# Patient Record
Sex: FEMALE | Race: WHITE | Employment: FULL TIME | URBAN - METROPOLITAN AREA
[De-identification: names, ages, dates, MRNs, and addresses within clinical notes are randomized per-mention and may not be internally consistent; named-entity substitution may affect disease eponyms.]

---

## 2018-05-14 ENCOUNTER — HOSPITAL ENCOUNTER (EMERGENCY)
Age: 53
Discharge: HOME | End: 2018-05-14
Payer: COMMERCIAL

## 2018-05-14 ENCOUNTER — APPOINTMENT (OUTPATIENT)
Dept: GENERAL RADIOLOGY | Age: 53
End: 2018-05-14
Payer: COMMERCIAL

## 2018-05-14 VITALS
WEIGHT: 219 LBS | DIASTOLIC BLOOD PRESSURE: 80 MMHG | OXYGEN SATURATION: 98 % | SYSTOLIC BLOOD PRESSURE: 138 MMHG | HEIGHT: 66 IN | BODY MASS INDEX: 35.2 KG/M2 | RESPIRATION RATE: 16 BRPM | HEART RATE: 96 BPM | TEMPERATURE: 98.8 F

## 2018-05-14 DIAGNOSIS — M25.561 ACUTE PAIN OF RIGHT KNEE: Primary | ICD-10-CM

## 2018-05-14 DIAGNOSIS — M25.461 EFFUSION OF RIGHT KNEE: ICD-10-CM

## 2018-05-14 PROCEDURE — 73564 X-RAY EXAM KNEE 4 OR MORE: CPT

## 2018-05-14 PROCEDURE — 96372 THER/PROPH/DIAG INJ SC/IM: CPT

## 2018-05-14 PROCEDURE — 99283 EMERGENCY DEPT VISIT LOW MDM: CPT

## 2018-05-14 PROCEDURE — 6360000002 HC RX W HCPCS: Performed by: NURSE PRACTITIONER

## 2018-05-14 RX ORDER — ONDANSETRON 4 MG/1
4 TABLET, FILM COATED ORAL EVERY 8 HOURS PRN
Qty: 12 TABLET | Refills: 0 | Status: SHIPPED | OUTPATIENT
Start: 2018-05-14 | End: 2018-05-19

## 2018-05-14 RX ORDER — VALSARTAN 160 MG/1
80 TABLET ORAL DAILY
COMMUNITY

## 2018-05-14 RX ORDER — OXYCODONE HYDROCHLORIDE AND ACETAMINOPHEN 5; 325 MG/1; MG/1
1 TABLET ORAL EVERY 6 HOURS PRN
Qty: 10 TABLET | Refills: 0 | Status: SHIPPED | OUTPATIENT
Start: 2018-05-14 | End: 2018-05-17

## 2018-05-14 RX ORDER — PANTOPRAZOLE SODIUM 40 MG/1
40 GRANULE, DELAYED RELEASE ORAL
Status: ON HOLD | COMMUNITY
End: 2020-11-11

## 2018-05-14 RX ORDER — KETOROLAC TROMETHAMINE 10 MG/1
10 TABLET, FILM COATED ORAL EVERY 6 HOURS PRN
Qty: 20 TABLET | Refills: 0 | Status: ON HOLD | OUTPATIENT
Start: 2018-05-14 | End: 2020-11-09

## 2018-05-14 RX ORDER — DEXAMETHASONE SODIUM PHOSPHATE 10 MG/ML
10 INJECTION, SOLUTION INTRAMUSCULAR; INTRAVENOUS ONCE
Status: COMPLETED | OUTPATIENT
Start: 2018-05-14 | End: 2018-05-14

## 2018-05-14 RX ORDER — PREDNISONE 10 MG/1
40 TABLET ORAL DAILY
Qty: 20 TABLET | Refills: 0 | Status: SHIPPED | OUTPATIENT
Start: 2018-05-14 | End: 2018-05-19

## 2018-05-14 RX ORDER — KETOROLAC TROMETHAMINE 30 MG/ML
60 INJECTION, SOLUTION INTRAMUSCULAR; INTRAVENOUS ONCE
Status: COMPLETED | OUTPATIENT
Start: 2018-05-14 | End: 2018-05-14

## 2018-05-14 RX ADMIN — KETOROLAC TROMETHAMINE 60 MG: 30 INJECTION, SOLUTION INTRAMUSCULAR at 20:07

## 2018-05-14 RX ADMIN — DEXAMETHASONE SODIUM PHOSPHATE 10 MG: 10 INJECTION, SOLUTION INTRAMUSCULAR; INTRAVENOUS at 20:09

## 2018-05-14 ASSESSMENT — PAIN DESCRIPTION - ORIENTATION
ORIENTATION: RIGHT
ORIENTATION: RIGHT

## 2018-05-14 ASSESSMENT — PAIN DESCRIPTION - PAIN TYPE
TYPE: ACUTE PAIN
TYPE: ACUTE PAIN

## 2018-05-14 ASSESSMENT — PAIN DESCRIPTION - ONSET: ONSET: ON-GOING

## 2018-05-14 ASSESSMENT — PAIN SCALES - GENERAL
PAINLEVEL_OUTOF10: 7
PAINLEVEL_OUTOF10: 8
PAINLEVEL_OUTOF10: 8

## 2018-05-14 ASSESSMENT — PAIN DESCRIPTION - FREQUENCY: FREQUENCY: CONTINUOUS

## 2018-05-14 ASSESSMENT — PAIN - FUNCTIONAL ASSESSMENT: PAIN_FUNCTIONAL_ASSESSMENT: 0-10

## 2018-05-14 ASSESSMENT — PAIN DESCRIPTION - LOCATION
LOCATION: KNEE
LOCATION: KNEE

## 2018-05-14 ASSESSMENT — PAIN DESCRIPTION - DESCRIPTORS: DESCRIPTORS: ACHING

## 2018-05-14 ASSESSMENT — PAIN DESCRIPTION - PROGRESSION: CLINICAL_PROGRESSION: GRADUALLY WORSENING

## 2019-01-07 ENCOUNTER — APPOINTMENT (OUTPATIENT)
Dept: CT IMAGING | Age: 54
End: 2019-01-07
Payer: COMMERCIAL

## 2019-01-07 ENCOUNTER — HOSPITAL ENCOUNTER (EMERGENCY)
Age: 54
Discharge: HOME | End: 2019-01-07
Attending: EMERGENCY MEDICINE
Payer: COMMERCIAL

## 2019-01-07 VITALS
SYSTOLIC BLOOD PRESSURE: 140 MMHG | HEIGHT: 66 IN | OXYGEN SATURATION: 97 % | DIASTOLIC BLOOD PRESSURE: 84 MMHG | BODY MASS INDEX: 28.12 KG/M2 | TEMPERATURE: 98.9 F | RESPIRATION RATE: 16 BRPM | WEIGHT: 175 LBS | HEART RATE: 95 BPM

## 2019-01-07 DIAGNOSIS — S09.90XA CLOSED HEAD INJURY, INITIAL ENCOUNTER: Primary | ICD-10-CM

## 2019-01-07 DIAGNOSIS — S06.0X0A CONCUSSION WITHOUT LOSS OF CONSCIOUSNESS, INITIAL ENCOUNTER: ICD-10-CM

## 2019-01-07 PROCEDURE — 2580000003 HC RX 258: Performed by: EMERGENCY MEDICINE

## 2019-01-07 PROCEDURE — 96375 TX/PRO/DX INJ NEW DRUG ADDON: CPT

## 2019-01-07 PROCEDURE — 99284 EMERGENCY DEPT VISIT MOD MDM: CPT

## 2019-01-07 PROCEDURE — 6360000002 HC RX W HCPCS: Performed by: EMERGENCY MEDICINE

## 2019-01-07 PROCEDURE — 70486 CT MAXILLOFACIAL W/O DYE: CPT

## 2019-01-07 PROCEDURE — 6360000002 HC RX W HCPCS

## 2019-01-07 PROCEDURE — 70450 CT HEAD/BRAIN W/O DYE: CPT

## 2019-01-07 PROCEDURE — 96374 THER/PROPH/DIAG INJ IV PUSH: CPT

## 2019-01-07 RX ORDER — ONDANSETRON 4 MG/1
4 TABLET, ORALLY DISINTEGRATING ORAL ONCE
Status: COMPLETED | OUTPATIENT
Start: 2019-01-07 | End: 2019-01-07

## 2019-01-07 RX ORDER — MORPHINE SULFATE 2 MG/ML
2 INJECTION, SOLUTION INTRAMUSCULAR; INTRAVENOUS ONCE
Status: COMPLETED | OUTPATIENT
Start: 2019-01-07 | End: 2019-01-07

## 2019-01-07 RX ORDER — KETOROLAC TROMETHAMINE 30 MG/ML
30 INJECTION, SOLUTION INTRAMUSCULAR; INTRAVENOUS ONCE
Status: DISCONTINUED | OUTPATIENT
Start: 2019-01-07 | End: 2019-01-07

## 2019-01-07 RX ORDER — ONDANSETRON 2 MG/ML
INJECTION INTRAMUSCULAR; INTRAVENOUS
Status: COMPLETED
Start: 2019-01-07 | End: 2019-01-07

## 2019-01-07 RX ORDER — 0.9 % SODIUM CHLORIDE 0.9 %
500 INTRAVENOUS SOLUTION INTRAVENOUS ONCE
Status: COMPLETED | OUTPATIENT
Start: 2019-01-07 | End: 2019-01-07

## 2019-01-07 RX ORDER — ONDANSETRON 2 MG/ML
4 INJECTION INTRAMUSCULAR; INTRAVENOUS ONCE
Status: DISCONTINUED | OUTPATIENT
Start: 2019-01-07 | End: 2019-01-07

## 2019-01-07 RX ORDER — FLUTICASONE FUROATE AND VILANTEROL 100; 25 UG/1; UG/1
POWDER RESPIRATORY (INHALATION) DAILY
COMMUNITY

## 2019-01-07 RX ORDER — ONDANSETRON 4 MG/1
4 TABLET, ORALLY DISINTEGRATING ORAL EVERY 4 HOURS PRN
Qty: 10 TABLET | Refills: 0 | Status: ON HOLD | OUTPATIENT
Start: 2019-01-07 | End: 2020-11-09

## 2019-01-07 RX ORDER — FENTANYL CITRATE 50 UG/ML
50 INJECTION, SOLUTION INTRAMUSCULAR; INTRAVENOUS ONCE
Status: COMPLETED | OUTPATIENT
Start: 2019-01-07 | End: 2019-01-07

## 2019-01-07 RX ORDER — 0.9 % SODIUM CHLORIDE 0.9 %
1000 INTRAVENOUS SOLUTION INTRAVENOUS ONCE
Status: DISCONTINUED | OUTPATIENT
Start: 2019-01-07 | End: 2019-01-07

## 2019-01-07 RX ORDER — ONDANSETRON 2 MG/ML
4 INJECTION INTRAMUSCULAR; INTRAVENOUS ONCE
Status: COMPLETED | OUTPATIENT
Start: 2019-01-07 | End: 2019-01-07

## 2019-01-07 RX ORDER — ONDANSETRON 4 MG/1
TABLET, ORALLY DISINTEGRATING ORAL
Status: COMPLETED
Start: 2019-01-07 | End: 2019-01-07

## 2019-01-07 RX ADMIN — ONDANSETRON 4 MG: 4 TABLET, ORALLY DISINTEGRATING ORAL at 22:28

## 2019-01-07 RX ADMIN — SODIUM CHLORIDE 500 ML: 9 INJECTION, SOLUTION INTRAVENOUS at 19:32

## 2019-01-07 RX ADMIN — MORPHINE SULFATE 2 MG: 2 INJECTION, SOLUTION INTRAMUSCULAR; INTRAVENOUS at 20:45

## 2019-01-07 RX ADMIN — ONDANSETRON 4 MG: 2 INJECTION INTRAMUSCULAR; INTRAVENOUS at 19:08

## 2019-01-07 RX ADMIN — FENTANYL CITRATE 50 MCG: 50 INJECTION, SOLUTION INTRAMUSCULAR; INTRAVENOUS at 19:31

## 2019-01-07 RX ADMIN — ONDANSETRON 4 MG: 2 INJECTION INTRAMUSCULAR; INTRAVENOUS at 18:43

## 2019-01-07 ASSESSMENT — PAIN DESCRIPTION - DESCRIPTORS
DESCRIPTORS: PRESSURE
DESCRIPTORS: HEADACHE
DESCRIPTORS: HEADACHE

## 2019-01-07 ASSESSMENT — PAIN SCALES - GENERAL
PAINLEVEL_OUTOF10: 10
PAINLEVEL_OUTOF10: 8
PAINLEVEL_OUTOF10: 10
PAINLEVEL_OUTOF10: 10
PAINLEVEL_OUTOF10: 8

## 2019-01-07 ASSESSMENT — PAIN DESCRIPTION - PAIN TYPE
TYPE: ACUTE PAIN

## 2019-01-07 ASSESSMENT — PAIN DESCRIPTION - LOCATION
LOCATION: HEAD

## 2019-01-07 ASSESSMENT — PAIN DESCRIPTION - FREQUENCY: FREQUENCY: CONTINUOUS

## 2019-01-07 ASSESSMENT — PAIN DESCRIPTION - PROGRESSION: CLINICAL_PROGRESSION: GRADUALLY WORSENING

## 2020-11-08 ENCOUNTER — APPOINTMENT (OUTPATIENT)
Dept: CT IMAGING | Age: 55
DRG: 177 | End: 2020-11-08
Payer: COMMERCIAL

## 2020-11-08 ENCOUNTER — HOSPITAL ENCOUNTER (INPATIENT)
Age: 55
LOS: 5 days | Discharge: HOME | DRG: 177 | End: 2020-11-14
Attending: EMERGENCY MEDICINE | Admitting: INTERNAL MEDICINE
Payer: COMMERCIAL

## 2020-11-08 ENCOUNTER — APPOINTMENT (OUTPATIENT)
Dept: GENERAL RADIOLOGY | Age: 55
DRG: 177 | End: 2020-11-08
Payer: COMMERCIAL

## 2020-11-08 LAB
ALBUMIN SERPL-MCNC: 3.8 G/DL (ref 3.5–5.2)
ALP BLD-CCNC: 116 U/L (ref 35–104)
ALT SERPL-CCNC: 63 U/L (ref 0–32)
ANION GAP SERPL CALCULATED.3IONS-SCNC: 14 MMOL/L (ref 7–16)
AST SERPL-CCNC: 41 U/L (ref 0–31)
BACTERIA: ABNORMAL /HPF
BASOPHILS ABSOLUTE: 0.01 E9/L (ref 0–0.2)
BASOPHILS RELATIVE PERCENT: 0.1 % (ref 0–2)
BILIRUB SERPL-MCNC: 0.4 MG/DL (ref 0–1.2)
BILIRUBIN URINE: NEGATIVE
BLOOD, URINE: NEGATIVE
BUN BLDV-MCNC: 10 MG/DL (ref 6–20)
CALCIUM SERPL-MCNC: 9.2 MG/DL (ref 8.6–10.2)
CHLORIDE BLD-SCNC: 99 MMOL/L (ref 98–107)
CLARITY: CLEAR
CO2: 22 MMOL/L (ref 22–29)
COLOR: YELLOW
CREAT SERPL-MCNC: 0.7 MG/DL (ref 0.5–1)
EOSINOPHILS ABSOLUTE: 0 E9/L (ref 0.05–0.5)
EOSINOPHILS RELATIVE PERCENT: 0 % (ref 0–6)
GFR AFRICAN AMERICAN: >60
GFR NON-AFRICAN AMERICAN: >60 ML/MIN/1.73
GLUCOSE BLD-MCNC: 125 MG/DL (ref 74–99)
GLUCOSE URINE: NEGATIVE MG/DL
HCT VFR BLD CALC: 41.2 % (ref 34–48)
HEMOGLOBIN: 13.8 G/DL (ref 11.5–15.5)
IMMATURE GRANULOCYTES #: 0.09 E9/L
IMMATURE GRANULOCYTES %: 1.1 % (ref 0–5)
INR BLD: 1.1
KETONES, URINE: NEGATIVE MG/DL
LACTIC ACID: 0.9 MMOL/L (ref 0.5–2.2)
LEUKOCYTE ESTERASE, URINE: NEGATIVE
LYMPHOCYTES ABSOLUTE: 1.09 E9/L (ref 1.5–4)
LYMPHOCYTES RELATIVE PERCENT: 13.9 % (ref 20–42)
MCH RBC QN AUTO: 29.2 PG (ref 26–35)
MCHC RBC AUTO-ENTMCNC: 33.5 % (ref 32–34.5)
MCV RBC AUTO: 87.1 FL (ref 80–99.9)
MONOCYTES ABSOLUTE: 0.51 E9/L (ref 0.1–0.95)
MONOCYTES RELATIVE PERCENT: 6.5 % (ref 2–12)
NEUTROPHILS ABSOLUTE: 6.14 E9/L (ref 1.8–7.3)
NEUTROPHILS RELATIVE PERCENT: 78.4 % (ref 43–80)
NITRITE, URINE: NEGATIVE
PDW BLD-RTO: 12.8 FL (ref 11.5–15)
PH UA: 6 (ref 5–9)
PLATELET # BLD: 225 E9/L (ref 130–450)
PMV BLD AUTO: 9.5 FL (ref 7–12)
POTASSIUM SERPL-SCNC: 3.5 MMOL/L (ref 3.5–5)
PRO-BNP: 206 PG/ML (ref 0–125)
PROTEIN UA: ABNORMAL MG/DL
PROTHROMBIN TIME: 12.3 SEC (ref 9.3–12.4)
RBC # BLD: 4.73 E12/L (ref 3.5–5.5)
RBC UA: ABNORMAL /HPF (ref 0–2)
SODIUM BLD-SCNC: 135 MMOL/L (ref 132–146)
SPECIFIC GRAVITY UA: 1.02 (ref 1–1.03)
TOTAL PROTEIN: 7.5 G/DL (ref 6.4–8.3)
TROPONIN: <0.01 NG/ML (ref 0–0.03)
UROBILINOGEN, URINE: >=8 E.U./DL
WBC # BLD: 7.8 E9/L (ref 4.5–11.5)
WBC UA: ABNORMAL /HPF (ref 0–5)

## 2020-11-08 PROCEDURE — 71045 X-RAY EXAM CHEST 1 VIEW: CPT

## 2020-11-08 PROCEDURE — 83880 ASSAY OF NATRIURETIC PEPTIDE: CPT

## 2020-11-08 PROCEDURE — 84484 ASSAY OF TROPONIN QUANT: CPT

## 2020-11-08 PROCEDURE — 36415 COLL VENOUS BLD VENIPUNCTURE: CPT

## 2020-11-08 PROCEDURE — 96374 THER/PROPH/DIAG INJ IV PUSH: CPT

## 2020-11-08 PROCEDURE — 80053 COMPREHEN METABOLIC PANEL: CPT

## 2020-11-08 PROCEDURE — 85025 COMPLETE CBC W/AUTO DIFF WBC: CPT

## 2020-11-08 PROCEDURE — 71275 CT ANGIOGRAPHY CHEST: CPT

## 2020-11-08 PROCEDURE — 99285 EMERGENCY DEPT VISIT HI MDM: CPT

## 2020-11-08 PROCEDURE — 87040 BLOOD CULTURE FOR BACTERIA: CPT

## 2020-11-08 PROCEDURE — 6360000002 HC RX W HCPCS: Performed by: STUDENT IN AN ORGANIZED HEALTH CARE EDUCATION/TRAINING PROGRAM

## 2020-11-08 PROCEDURE — 83605 ASSAY OF LACTIC ACID: CPT

## 2020-11-08 PROCEDURE — 6370000000 HC RX 637 (ALT 250 FOR IP)

## 2020-11-08 PROCEDURE — 96375 TX/PRO/DX INJ NEW DRUG ADDON: CPT

## 2020-11-08 PROCEDURE — 85610 PROTHROMBIN TIME: CPT

## 2020-11-08 PROCEDURE — 87088 URINE BACTERIA CULTURE: CPT

## 2020-11-08 PROCEDURE — 81001 URINALYSIS AUTO W/SCOPE: CPT

## 2020-11-08 PROCEDURE — 93005 ELECTROCARDIOGRAM TRACING: CPT | Performed by: NURSE PRACTITIONER

## 2020-11-08 PROCEDURE — 6360000002 HC RX W HCPCS

## 2020-11-08 PROCEDURE — 2580000003 HC RX 258: Performed by: EMERGENCY MEDICINE

## 2020-11-08 RX ORDER — ACETAMINOPHEN 500 MG
TABLET ORAL
Status: COMPLETED
Start: 2020-11-08 | End: 2020-11-08

## 2020-11-08 RX ORDER — 0.9 % SODIUM CHLORIDE 0.9 %
1000 INTRAVENOUS SOLUTION INTRAVENOUS ONCE
Status: COMPLETED | OUTPATIENT
Start: 2020-11-08 | End: 2020-11-09

## 2020-11-08 RX ORDER — DEXAMETHASONE SODIUM PHOSPHATE 10 MG/ML
6 INJECTION, SOLUTION INTRAMUSCULAR; INTRAVENOUS ONCE
Status: COMPLETED | OUTPATIENT
Start: 2020-11-08 | End: 2020-11-08

## 2020-11-08 RX ORDER — ONDANSETRON 2 MG/ML
4 INJECTION INTRAMUSCULAR; INTRAVENOUS ONCE
Status: COMPLETED | OUTPATIENT
Start: 2020-11-08 | End: 2020-11-08

## 2020-11-08 RX ORDER — ONDANSETRON 2 MG/ML
INJECTION INTRAMUSCULAR; INTRAVENOUS
Status: COMPLETED
Start: 2020-11-08 | End: 2020-11-08

## 2020-11-08 RX ADMIN — DEXAMETHASONE SODIUM PHOSPHATE 6 MG: 10 INJECTION, SOLUTION INTRAMUSCULAR; INTRAVENOUS at 21:19

## 2020-11-08 RX ADMIN — ACETAMINOPHEN 1000 MG: 500 TABLET ORAL at 22:16

## 2020-11-08 RX ADMIN — SODIUM CHLORIDE 1000 ML: 9 INJECTION, SOLUTION INTRAVENOUS at 21:17

## 2020-11-08 RX ADMIN — ONDANSETRON 4 MG: 2 INJECTION INTRAMUSCULAR; INTRAVENOUS at 22:29

## 2020-11-08 ASSESSMENT — ENCOUNTER SYMPTOMS
ABDOMINAL DISTENTION: 0
DIARRHEA: 0
BACK PAIN: 0
EYE PAIN: 0
WHEEZING: 0
COUGH: 1
SINUS PRESSURE: 0
EYE REDNESS: 0
EYE DISCHARGE: 0
SHORTNESS OF BREATH: 1
SORE THROAT: 0
NAUSEA: 0
HEMOPTYSIS: 0
VOMITING: 0
ABDOMINAL PAIN: 0

## 2020-11-08 ASSESSMENT — PAIN SCALES - GENERAL: PAINLEVEL_OUTOF10: 10

## 2020-11-09 PROBLEM — R74.8 ABNORMAL LIVER ENZYMES: Status: ACTIVE | Noted: 2020-11-09

## 2020-11-09 PROBLEM — J96.01 ACUTE HYPOXEMIC RESPIRATORY FAILURE (HCC): Status: ACTIVE | Noted: 2020-11-09

## 2020-11-09 PROBLEM — J12.82 PNEUMONIA DUE TO COVID-19 VIRUS: Status: ACTIVE | Noted: 2020-11-09

## 2020-11-09 PROBLEM — A41.9 SEPSIS (HCC): Status: ACTIVE | Noted: 2020-11-09

## 2020-11-09 PROBLEM — I50.22 CHRONIC SYSTOLIC CONGESTIVE HEART FAILURE (HCC): Status: ACTIVE | Noted: 2020-11-09

## 2020-11-09 PROBLEM — U07.1 PNEUMONIA DUE TO COVID-19 VIRUS: Status: ACTIVE | Noted: 2020-11-09

## 2020-11-09 LAB
ALBUMIN SERPL-MCNC: 3.9 G/DL (ref 3.5–5.2)
ALP BLD-CCNC: 103 U/L (ref 35–104)
ALT SERPL-CCNC: 52 U/L (ref 0–32)
ANION GAP SERPL CALCULATED.3IONS-SCNC: 7 MMOL/L (ref 7–16)
APTT: 26.8 SEC (ref 24.5–35.1)
AST SERPL-CCNC: 34 U/L (ref 0–31)
BASOPHILS ABSOLUTE: 0.01 E9/L (ref 0–0.2)
BASOPHILS RELATIVE PERCENT: 0.2 % (ref 0–2)
BILIRUB SERPL-MCNC: 0.4 MG/DL (ref 0–1.2)
BUN BLDV-MCNC: 11 MG/DL (ref 6–20)
C-REACTIVE PROTEIN: 13.7 MG/DL (ref 0–0.4)
CALCIUM SERPL-MCNC: 9 MG/DL (ref 8.6–10.2)
CHLORIDE BLD-SCNC: 104 MMOL/L (ref 98–107)
CO2: 28 MMOL/L (ref 22–29)
CREAT SERPL-MCNC: 0.6 MG/DL (ref 0.5–1)
D DIMER: 517 NG/ML DDU
D DIMER: 518 NG/ML DDU
EKG ATRIAL RATE: 98 BPM
EKG P AXIS: 28 DEGREES
EKG P-R INTERVAL: 206 MS
EKG Q-T INTERVAL: 348 MS
EKG QRS DURATION: 106 MS
EKG QTC CALCULATION (BAZETT): 444 MS
EKG R AXIS: -41 DEGREES
EKG T AXIS: 58 DEGREES
EKG VENTRICULAR RATE: 98 BPM
EOSINOPHILS ABSOLUTE: 0 E9/L (ref 0.05–0.5)
EOSINOPHILS RELATIVE PERCENT: 0 % (ref 0–6)
FERRITIN: 470 NG/ML
FIBRINOGEN: 700 MG/DL (ref 225–540)
FIBRINOGEN: >700 MG/DL (ref 225–540)
GFR AFRICAN AMERICAN: >60
GFR NON-AFRICAN AMERICAN: >60 ML/MIN/1.73
GLUCOSE BLD-MCNC: 138 MG/DL (ref 74–99)
HCT VFR BLD CALC: 37.2 % (ref 34–48)
HEMOGLOBIN: 12.6 G/DL (ref 11.5–15.5)
IMMATURE GRANULOCYTES #: 0.06 E9/L
IMMATURE GRANULOCYTES %: 1.2 % (ref 0–5)
LACTATE DEHYDROGENASE: 309 U/L (ref 135–214)
LACTIC ACID: 1.2 MMOL/L (ref 0.5–2.2)
LYMPHOCYTES ABSOLUTE: 1.05 E9/L (ref 1.5–4)
LYMPHOCYTES RELATIVE PERCENT: 21.6 % (ref 20–42)
MAGNESIUM: 2.3 MG/DL (ref 1.6–2.6)
MCH RBC QN AUTO: 29.9 PG (ref 26–35)
MCHC RBC AUTO-ENTMCNC: 33.9 % (ref 32–34.5)
MCV RBC AUTO: 88.2 FL (ref 80–99.9)
MONOCYTES ABSOLUTE: 0.29 E9/L (ref 0.1–0.95)
MONOCYTES RELATIVE PERCENT: 6 % (ref 2–12)
NEUTROPHILS ABSOLUTE: 3.44 E9/L (ref 1.8–7.3)
NEUTROPHILS RELATIVE PERCENT: 71 % (ref 43–80)
PDW BLD-RTO: 12.9 FL (ref 11.5–15)
PLATELET # BLD: 242 E9/L (ref 130–450)
PMV BLD AUTO: 9.7 FL (ref 7–12)
POTASSIUM REFLEX MAGNESIUM: 3.2 MMOL/L (ref 3.5–5)
PROCALCITONIN: 0.05 NG/ML (ref 0–0.08)
RBC # BLD: 4.22 E12/L (ref 3.5–5.5)
SODIUM BLD-SCNC: 139 MMOL/L (ref 132–146)
TOTAL PROTEIN: 7.4 G/DL (ref 6.4–8.3)
TROPONIN: <0.01 NG/ML (ref 0–0.03)
TROPONIN: <0.01 NG/ML (ref 0–0.03)
WBC # BLD: 4.9 E9/L (ref 4.5–11.5)

## 2020-11-09 PROCEDURE — 2580000003 HC RX 258

## 2020-11-09 PROCEDURE — 6360000002 HC RX W HCPCS: Performed by: FAMILY MEDICINE

## 2020-11-09 PROCEDURE — 85730 THROMBOPLASTIN TIME PARTIAL: CPT

## 2020-11-09 PROCEDURE — 2580000003 HC RX 258: Performed by: FAMILY MEDICINE

## 2020-11-09 PROCEDURE — 82728 ASSAY OF FERRITIN: CPT

## 2020-11-09 PROCEDURE — 83615 LACTATE (LD) (LDH) ENZYME: CPT

## 2020-11-09 PROCEDURE — 83735 ASSAY OF MAGNESIUM: CPT

## 2020-11-09 PROCEDURE — 2500000003 HC RX 250 WO HCPCS: Performed by: SPECIALIST

## 2020-11-09 PROCEDURE — 85378 FIBRIN DEGRADE SEMIQUANT: CPT

## 2020-11-09 PROCEDURE — 83605 ASSAY OF LACTIC ACID: CPT

## 2020-11-09 PROCEDURE — 6370000000 HC RX 637 (ALT 250 FOR IP): Performed by: SPECIALIST

## 2020-11-09 PROCEDURE — 80053 COMPREHEN METABOLIC PANEL: CPT

## 2020-11-09 PROCEDURE — 85025 COMPLETE CBC W/AUTO DIFF WBC: CPT

## 2020-11-09 PROCEDURE — 84484 ASSAY OF TROPONIN QUANT: CPT

## 2020-11-09 PROCEDURE — 85384 FIBRINOGEN ACTIVITY: CPT

## 2020-11-09 PROCEDURE — 36415 COLL VENOUS BLD VENIPUNCTURE: CPT

## 2020-11-09 PROCEDURE — 84145 PROCALCITONIN (PCT): CPT

## 2020-11-09 PROCEDURE — 93010 ELECTROCARDIOGRAM REPORT: CPT | Performed by: INTERNAL MEDICINE

## 2020-11-09 PROCEDURE — 2580000003 HC RX 258: Performed by: SPECIALIST

## 2020-11-09 PROCEDURE — 6370000000 HC RX 637 (ALT 250 FOR IP): Performed by: FAMILY MEDICINE

## 2020-11-09 PROCEDURE — 2140000000 HC CCU INTERMEDIATE R&B

## 2020-11-09 PROCEDURE — 86140 C-REACTIVE PROTEIN: CPT

## 2020-11-09 PROCEDURE — XW033E5 INTRODUCTION OF REMDESIVIR ANTI-INFECTIVE INTO PERIPHERAL VEIN, PERCUTANEOUS APPROACH, NEW TECHNOLOGY GROUP 5: ICD-10-PCS | Performed by: SPECIALIST

## 2020-11-09 PROCEDURE — 6370000000 HC RX 637 (ALT 250 FOR IP): Performed by: INTERNAL MEDICINE

## 2020-11-09 RX ORDER — ACETAMINOPHEN 650 MG/1
650 SUPPOSITORY RECTAL EVERY 6 HOURS PRN
Status: DISCONTINUED | OUTPATIENT
Start: 2020-11-09 | End: 2020-11-14 | Stop reason: HOSPADM

## 2020-11-09 RX ORDER — ALBUTEROL SULFATE 90 UG/1
2 AEROSOL, METERED RESPIRATORY (INHALATION) EVERY 6 HOURS PRN
Status: DISCONTINUED | OUTPATIENT
Start: 2020-11-09 | End: 2020-11-14 | Stop reason: HOSPADM

## 2020-11-09 RX ORDER — FLUTICASONE PROPIONATE 50 MCG
1 SPRAY, SUSPENSION (ML) NASAL DAILY
Status: DISCONTINUED | OUTPATIENT
Start: 2020-11-09 | End: 2020-11-14 | Stop reason: HOSPADM

## 2020-11-09 RX ORDER — LANOLIN ALCOHOL/MO/W.PET/CERES
6 CREAM (GRAM) TOPICAL DAILY
Status: DISCONTINUED | OUTPATIENT
Start: 2020-11-09 | End: 2020-11-14 | Stop reason: HOSPADM

## 2020-11-09 RX ORDER — PANTOPRAZOLE SODIUM 40 MG/1
40 TABLET, DELAYED RELEASE ORAL
Status: DISCONTINUED | OUTPATIENT
Start: 2020-11-09 | End: 2020-11-14 | Stop reason: HOSPADM

## 2020-11-09 RX ORDER — ZINC SULFATE 50(220)MG
50 CAPSULE ORAL DAILY
Status: DISCONTINUED | OUTPATIENT
Start: 2020-11-09 | End: 2020-11-14 | Stop reason: HOSPADM

## 2020-11-09 RX ORDER — GUAIFENESIN/DEXTROMETHORPHAN 100-10MG/5
5 SYRUP ORAL EVERY 4 HOURS PRN
Status: DISCONTINUED | OUTPATIENT
Start: 2020-11-09 | End: 2020-11-14 | Stop reason: HOSPADM

## 2020-11-09 RX ORDER — SODIUM CHLORIDE 9 MG/ML
INJECTION, SOLUTION INTRAVENOUS CONTINUOUS
Status: DISCONTINUED | OUTPATIENT
Start: 2020-11-09 | End: 2020-11-09

## 2020-11-09 RX ORDER — POTASSIUM CHLORIDE 20 MEQ/1
40 TABLET, EXTENDED RELEASE ORAL ONCE
Status: COMPLETED | OUTPATIENT
Start: 2020-11-09 | End: 2020-11-09

## 2020-11-09 RX ORDER — ACETAMINOPHEN 325 MG/1
650 TABLET ORAL EVERY 6 HOURS PRN
Status: DISCONTINUED | OUTPATIENT
Start: 2020-11-09 | End: 2020-11-14 | Stop reason: HOSPADM

## 2020-11-09 RX ORDER — 0.9 % SODIUM CHLORIDE 0.9 %
30 INTRAVENOUS SOLUTION INTRAVENOUS PRN
Status: DISCONTINUED | OUTPATIENT
Start: 2020-11-09 | End: 2020-11-14 | Stop reason: HOSPADM

## 2020-11-09 RX ORDER — CARVEDILOL 3.12 MG/1
3.12 TABLET ORAL 2 TIMES DAILY WITH MEALS
Status: DISCONTINUED | OUTPATIENT
Start: 2020-11-09 | End: 2020-11-11

## 2020-11-09 RX ORDER — ASCORBIC ACID 500 MG
500 TABLET ORAL 2 TIMES DAILY
Status: DISCONTINUED | OUTPATIENT
Start: 2020-11-09 | End: 2020-11-14 | Stop reason: HOSPADM

## 2020-11-09 RX ORDER — BUDESONIDE AND FORMOTEROL FUMARATE DIHYDRATE 80; 4.5 UG/1; UG/1
2 AEROSOL RESPIRATORY (INHALATION) 2 TIMES DAILY
Status: DISCONTINUED | OUTPATIENT
Start: 2020-11-09 | End: 2020-11-14 | Stop reason: HOSPADM

## 2020-11-09 RX ORDER — MONTELUKAST SODIUM 10 MG/1
10 TABLET ORAL NIGHTLY
COMMUNITY

## 2020-11-09 RX ORDER — ATORVASTATIN CALCIUM 20 MG/1
20 TABLET, FILM COATED ORAL NIGHTLY
Status: DISCONTINUED | OUTPATIENT
Start: 2020-11-09 | End: 2020-11-14 | Stop reason: HOSPADM

## 2020-11-09 RX ORDER — DEXAMETHASONE 4 MG/1
6 TABLET ORAL DAILY
Status: DISCONTINUED | OUTPATIENT
Start: 2020-11-09 | End: 2020-11-14 | Stop reason: HOSPADM

## 2020-11-09 RX ORDER — VALSARTAN 160 MG/1
160 TABLET ORAL DAILY
Status: DISCONTINUED | OUTPATIENT
Start: 2020-11-09 | End: 2020-11-11

## 2020-11-09 RX ORDER — SODIUM CHLORIDE 0.9 % (FLUSH) 0.9 %
SYRINGE (ML) INJECTION
Status: COMPLETED
Start: 2020-11-09 | End: 2020-11-09

## 2020-11-09 RX ORDER — LEVOFLOXACIN 5 MG/ML
500 INJECTION, SOLUTION INTRAVENOUS EVERY 24 HOURS
Status: DISCONTINUED | OUTPATIENT
Start: 2020-11-09 | End: 2020-11-09

## 2020-11-09 RX ORDER — SODIUM CHLORIDE 9 MG/ML
INJECTION, SOLUTION INTRAVENOUS CONTINUOUS
Status: ACTIVE | OUTPATIENT
Start: 2020-11-09 | End: 2020-11-09

## 2020-11-09 RX ADMIN — LEVOFLOXACIN 500 MG: 5 INJECTION, SOLUTION INTRAVENOUS at 02:28

## 2020-11-09 RX ADMIN — SODIUM CHLORIDE: 9 INJECTION, SOLUTION INTRAVENOUS at 05:51

## 2020-11-09 RX ADMIN — ATORVASTATIN CALCIUM 20 MG: 20 TABLET, FILM COATED ORAL at 21:09

## 2020-11-09 RX ADMIN — CARVEDILOL 3.12 MG: 3.12 TABLET, FILM COATED ORAL at 16:32

## 2020-11-09 RX ADMIN — Medication 50 MG: at 09:45

## 2020-11-09 RX ADMIN — ENOXAPARIN SODIUM 30 MG: 30 INJECTION SUBCUTANEOUS at 21:09

## 2020-11-09 RX ADMIN — ACETAMINOPHEN 650 MG: 325 TABLET ORAL at 16:33

## 2020-11-09 RX ADMIN — ENOXAPARIN SODIUM 30 MG: 30 INJECTION SUBCUTANEOUS at 05:52

## 2020-11-09 RX ADMIN — VALSARTAN 160 MG: 160 TABLET, FILM COATED ORAL at 09:45

## 2020-11-09 RX ADMIN — SODIUM CHLORIDE, PRESERVATIVE FREE 10 ML: 5 INJECTION INTRAVENOUS at 21:09

## 2020-11-09 RX ADMIN — PANTOPRAZOLE SODIUM 40 MG: 40 TABLET, DELAYED RELEASE ORAL at 06:13

## 2020-11-09 RX ADMIN — GUAIFENESIN AND DEXTROMETHORPHAN 5 ML: 100; 10 SYRUP ORAL at 10:28

## 2020-11-09 RX ADMIN — SODIUM CHLORIDE: 9 INJECTION, SOLUTION INTRAVENOUS at 02:28

## 2020-11-09 RX ADMIN — GUAIFENESIN AND DEXTROMETHORPHAN 5 ML: 100; 10 SYRUP ORAL at 06:26

## 2020-11-09 RX ADMIN — REMDESIVIR 200 MG: 100 INJECTION, POWDER, LYOPHILIZED, FOR SOLUTION INTRAVENOUS at 12:07

## 2020-11-09 RX ADMIN — OXYCODONE HYDROCHLORIDE AND ACETAMINOPHEN 500 MG: 500 TABLET ORAL at 09:45

## 2020-11-09 RX ADMIN — ALBUTEROL SULFATE 2 PUFF: 90 AEROSOL, METERED RESPIRATORY (INHALATION) at 16:34

## 2020-11-09 RX ADMIN — Medication 6 MG: at 12:07

## 2020-11-09 RX ADMIN — BUDESONIDE AND FORMOTEROL FUMARATE DIHYDRATE 2 PUFF: 80; 4.5 AEROSOL RESPIRATORY (INHALATION) at 21:10

## 2020-11-09 RX ADMIN — POTASSIUM CHLORIDE 40 MEQ: 20 TABLET, EXTENDED RELEASE ORAL at 16:32

## 2020-11-09 RX ADMIN — CARVEDILOL 3.12 MG: 3.12 TABLET, FILM COATED ORAL at 09:45

## 2020-11-09 RX ADMIN — ACETAMINOPHEN 650 MG: 325 TABLET ORAL at 06:27

## 2020-11-09 RX ADMIN — GUAIFENESIN AND DEXTROMETHORPHAN 5 ML: 100; 10 SYRUP ORAL at 16:34

## 2020-11-09 RX ADMIN — BUDESONIDE AND FORMOTEROL FUMARATE DIHYDRATE 2 PUFF: 80; 4.5 AEROSOL RESPIRATORY (INHALATION) at 09:45

## 2020-11-09 RX ADMIN — GUAIFENESIN AND DEXTROMETHORPHAN 5 ML: 100; 10 SYRUP ORAL at 22:19

## 2020-11-09 RX ADMIN — OXYCODONE HYDROCHLORIDE AND ACETAMINOPHEN 500 MG: 500 TABLET ORAL at 21:09

## 2020-11-09 RX ADMIN — DEXAMETHASONE 6 MG: 4 TABLET ORAL at 09:45

## 2020-11-09 RX ADMIN — FLUTICASONE PROPIONATE 1 SPRAY: 50 SPRAY, METERED NASAL at 09:45

## 2020-11-09 ASSESSMENT — PAIN SCALES - GENERAL
PAINLEVEL_OUTOF10: 0
PAINLEVEL_OUTOF10: 0
PAINLEVEL_OUTOF10: 8
PAINLEVEL_OUTOF10: 4
PAINLEVEL_OUTOF10: 0
PAINLEVEL_OUTOF10: 3

## 2020-11-09 NOTE — ED PROVIDER NOTES
FIRST PROVIDER CONTACT ASSESSMENT NOTE      Department of Emergency Medicine   11/8/20  7:18 PM EST    Chief Complaint: Concern For COVID-19 (pt is covid + , c/o dizziness / weakness/ SOB when walking )      History of Present Illness:   Cher Amos is a 54 y.o. female who presents to the ED for    Medical History:  has a past medical history of Acute systolic congestive heart failure, NYHA class 3 ([address] Chronic gastritis, Herniated disc, HTN (hypertension), and Hyperlipidemia. Surgical History:  has a past surgical history that includes Hysterectomy and back surgery. Social History:  reports that she has never smoked. She has never used smokeless tobacco. She reports that she does not drink alcohol or use drugs. Family History: family history includes Heart Disease in her father; High Blood Pressure in her father.     *ALLERGIES*     Codeine; Minocycline; Pcn [penicillins]; and Sulfa antibiotics     Physical Exam:      VS:  BP (!) 150/100   Pulse 108   Temp 98 °F (36.7 °C)   Resp 18   Ht 5' 6\" (1.676 m)   Wt 180 lb (81.6 kg)   SpO2 90%   BMI 29.05 kg/m²      Initial Plan of Care:  Initiate Treatment-Testing, Proceed toTreatment Area When Bed Available for ED Attending/MLP to Continue Care    -----------------END OF FIRST PROVIDER CONTACT ASSESSMENT NOTE--------------  Electronically signed by HIRA Calvin CNP   DD: 11/8/20             HIRA Bernabe CNP  11/08/20 1918

## 2020-11-09 NOTE — CONSULTS
[address] [place] [address] [facility]    Consultation Note     Admit Date: 11/8/2020  7:29 PM    Reason for Consult:   Shortness of breath Covid positive    Attending Physician:  Alvina Nayak MD     Chief Complaint: Shortness of breath/Covid positive    HISTORY OF PRESENT ILLNESS:   The patient is a 54 y.o.  woman not  known to the Infectious Diseases service. The patient is admitted through the emergency room with fever shortness of breath and dyspnea on exertion. Primary care physician put on steroids but her O2 sat has been low and as low was 88%. She had generalized malaise cough and fatigue. She does have a history of cardiac disease with CHF and cardiomyopathy. There is no nausea vomiting abdominal pain or diarrhea. Laboratory data the emergency room. Showed a BUN and creatinine were normal and a pro calcitonin was 0.05 however her C-reactive protein was 13.7, LDH was 309, proBNP was only 206, LFTs were slightly up at 63 and 41 but her fibrinogen was greater than 700 D-dimer was 518 and ferritin was 470. She had a CT angio in the emergency room that showed diffuse bilateral groundglass infiltrates. She was started on Levaquin and ID was asked to evaluate.     Past Medical History:        Diagnosis Date    Acute systolic congestive heart failure, NYHA class 3 (HCC) 3/27/2014    Chronic gastritis 3/26/2014    Herniated disc     HTN (hypertension) 3/26/2014    Hyperlipidemia      Past Surgical History:        Procedure Laterality Date    BACK SURGERY      CYSTOURETHROSCOPY/URETHRAL DILATION Bilateral 2000    HYSTERECTOMY       Current Medications:   Scheduled Meds:   levofloxacin  500 mg Intravenous Q24H    zinc sulfate  50 mg Oral Daily    vitamin C  500 mg Oral BID    dexamethasone  6 mg Oral Daily    atorvastatin  20 mg Oral Nightly    carvedilol  3.125 mg Oral BID     fluticasone  1 spray Nasal Daily    budesonide-formoterol  2 puff Inhalation BID    pantoprazole  40 mg Oral QAM AC    valsartan  160 mg Oral Daily    enoxaparin  30 mg Subcutaneous BID     Continuous Infusions:   sodium chloride 50 mL/hr at 11/09/20 0551     PRN Meds:acetaminophen **OR** acetaminophen, albuterol sulfate HFA, guaiFENesin-dextromethorphan, iopamidol    Allergies:  Minocycline; Pcn [penicillins]; Sulfa antibiotics; and Codeine    Social History:   Social History     Socioeconomic History    Marital status: Single     Spouse name: None    Number of children: None    Years of education: None    Highest education level: None   Occupational History    Occupation: utilization nurse   Social Needs    Financial resource strain: None    Food insecurity     Worry: None     Inability: None    Transportation needs     Medical: None     Non-medical: None   Tobacco Use    Smoking status: Never Smoker    Smokeless tobacco: Never Used   Substance and Sexual Activity    Alcohol use: Yes     Comment: socially-once a month    Drug use: No    Sexual activity: None   Lifestyle    Physical activity     Days per week: None     Minutes per session: None    Stress: None   Relationships    Social connections     Talks on phone: None     Gets together: None     Attends Congregation service: None     Active member of club or organization: None     Attends meetings of clubs or organizations: None     Relationship status: None    Intimate partner violence     Fear of current or ex partner: None     Emotionally abused: None     Physically abused: None     Forced sexual activity: None   Other Topics Concern    None   Social History Narrative    None     Tobacco: No  Alcohol: No  Pets: No  Travel: No    Family History:       Problem Relation Age of Onset    Heart Disease Father     Cancer Father         lung    Diabetes Mother    [place] Other Mother         brain tumor   . Otherwise non-pertinent to the chief complaint.     REVIEW OF SYSTEMS:    CONSTITUTIONAL: Positive chills, positive fevers positive night sweats. No loss of weight. EYES:  No double vision or drainage from eyes, ears or throat. HEENT:  No neck stiffness. No dysphagia. No drainage from eyes, ears or throat  RESPIRATORY: Positive cough, productive sputum or hemoptysis. CARDIOVASCULAR:  No chest pain, palpitations, orthopnea or dyspnea on exertion. GASTROINTESTINAL:  No nausea, vomiting, diarrhea or constipation or hematochezia   GENITOURINARY:  No frequency burning dysuria or hematuria. INTEGUMENT/BREAST:  No rash or breast masses. HEMATOLOGIC/LYMPHATIC:  No lymphadenopathy or blood dyscrasics. ALLERGIC/IMMUNOLOGIC:  No anaphylaxis. ENDOCRINE:  No polyuria or polydipsia or temperature intolerance. MUSCULOSKELETAL: Positive myalgia or arthralgia. Full ROM. NEUROLOGICAL:  No focal motor sensory deficit. BEHAVIOR/PSYCH:  No psychosis. PHYSICAL EXAM:    Vitals:    /78   Pulse 75   Temp 97.5 °F (36.4 °C) (Infrared)   Resp 18   Ht 5' 6\" (1.676 m)   Wt 194 lb 12.8 oz (88.4 kg)   SpO2 94%   BMI 31.44 kg/m²   Constitutional: The patient is awake, alert, and oriented. Skin: Warm and dry. No rashes were noted. No jaundice. HEENT: Eyes show round, and reactive pupils. Moist mucous membranes, no ulcerations, no thrush. Neck: Supple to movements. No lymphadenopathy. Chest: No use of accessory muscles to breathe. Symmetrical expansion. Auscultation reveals no wheezing, crackles, or rhonchi. Cardiovascular: S1 and S2 are rhythmic and regular. No murmurs appreciated. Abdomen: Positive bowel sounds to auscultation. Benign to palpation. No masses felt. No hepatosplenomegaly. Genitourinary: Female  Extremities: No clubbing, no cyanosis, no edema.   Musculoskeletal: Equal and symmetrical  Neurological: No focal  Lines: peripheral      CBC+dif:  Recent Labs     11/08/20 1922   WBC 7.8   HGB 13.8   HCT 41.2   MCV 87.1      NEUTROABS 6.14     Lab Results   Component Value Date    CRP 13.7 (H) 11/09/2020     No results found for: CRPHS  No results found for: SEDRATE  Lab Results   Component Value Date    ALT 63 (H) 11/08/2020    AST 41 (H) 11/08/2020    ALKPHOS 116 (H) 11/08/2020    BILITOT 0.4 11/08/2020     Lab Results   Component Value Date     11/08/2020    K 3.5 11/08/2020    CL 99 11/08/2020    CO2 22 11/08/2020    BUN 10 11/08/2020    CREATININE 0.7 11/08/2020    GFRAA >60 11/08/2020    LABGLOM >60 11/08/2020    GLUCOSE 125 11/08/2020    PROT 7.5 11/08/2020    LABALBU 3.8 11/08/2020    CALCIUM 9.2 11/08/2020    BILITOT 0.4 11/08/2020    ALKPHOS 116 11/08/2020    AST 41 11/08/2020    ALT 63 11/08/2020       Lab Results   Component Value Date    PROTIME 12.3 11/08/2020    INR 1.1 11/08/2020       Lab Results   Component Value Date    TSH 1.610 03/26/2014       Lab Results   Component Value Date    COLORU Yellow 11/08/2020    PHUR 6.0 11/08/2020    WBCUA 2-5 11/08/2020    RBCUA 0-1 11/08/2020    BACTERIA FEW 11/08/2020    CLARITYU Clear 11/08/2020    SPECGRAV 1.025 11/08/2020    LEUKOCYTESUR Negative 11/08/2020    UROBILINOGEN >=8.0 11/08/2020    BILIRUBINUR Negative 11/08/2020    BLOODU Negative 11/08/2020    GLUCOSEU Negative 11/08/2020       No results found for: [place], BEART, [id number], PHART, THGBART, XQL6VVF, PO2ART, KAP9DYW  Radiology:  CTA PULMONARY W CONTRAST   Final Result   No evidence of pulmonary embolism. Bilateral areas of ground-glass appearance consistent with the patient's   known COVID related illness. XR CHEST PORTABLE   Final Result   No acute process. Microbiology:  Pending  No results for input(s): BC in the last 72 hours. No results for input(s): ORG in the last 72 hours. No results for input(s): José Reading in the last 72 hours. No results for input(s): STREPNEUMAGU in the last 72 hours. No results for input(s): LP1UAG in the last 72 hours. No results for input(s): ASO in the last 72 hours.   No results for input(s): CULTRESP in the last 72

## 2020-11-09 NOTE — ED NOTES
Bed: 07  Expected date:   Expected time:   Means of arrival:   Comments:  Hold Ready care     Daljit Garcia RN  11/09/20 6423

## 2020-11-09 NOTE — H&P
Hospital Medicine History & Physical      PCP: Anshularsenio Marcos    Date of Admission: 11/8/2020    Date of Service: Pt seen/examined on 11/9/2020 and Admitted to Inpatient with expected LOS greater than two midnights due to medical therapy. Chief Complaint: Shortness of breath and cough      History Of Present Illness:      54 y.o. female who presented to [facility] with past medical history of chronic systolic CHF, EF 99% in 0399, hypertension, hyperlipidemia and chronic gastritis. Patient started feeling sick 2 days ago. She had tested positive for Covid 5 days ago and has been monitoring saturation at home. For the past 2 days, she has been having shortness of breath on and off, moderate in intensity, associated with cough and hypoxemia. Saturation in the 80s. She reports nausea but no vomiting, dizziness with near syncope while ambulating, no chest pain. No leg swelling. No abdominal pain. Vital signs notable for pulse rate 116, respiratory rate 25, blood pressure 150/100. Labs showed AST 41, ALT 63, phosphatase 116, proBNP 206, negative urinalysis and troponin. EKG shows sinus rhythm rate of 98. Chest x-ray is negative. CTA of the chest shows bilateral groundglass opacities. No PE. She is being admitted for further management. Past Medical History:          Diagnosis Date    Acute systolic congestive heart failure, NYHA class 3 (HCC) 3/27/2014    Chronic gastritis 3/26/2014    Herniated disc     HTN (hypertension) 3/26/2014    Hyperlipidemia        Past Surgical History:          Procedure Laterality Date    BACK SURGERY      HYSTERECTOMY         Medications Prior to Admission:      Prior to Admission medications    Medication Sig Start Date End Date Taking?  Authorizing Provider   fluticasone-vilanterol (BREO ELLIPTA) 100-25 MCG/INH AEPB inhaler Inhale into the lungs daily    Historical Provider, MD   ondansetron (ZOFRAN ODT) 4 MG disintegrating tablet Take 1 tablet by mouth every 4 Extra ocular muscles intact. Conjunctivae/corneas clear. Neck: Supple, with full range of motion. No jugular venous distention. Trachea midline. Respiratory: Mild increased work of breathing. Diffuse crackles  Cardiovascular:  Regular rate and rhythm with normal S1/S2 without murmurs, rubs or gallops. Abdomen: Soft, non-tender, non-distended with normal bowel sounds. Musculoskeletal:  No clubbing, cyanosis or edema bilaterally. Full range of motion without deformity. Skin: Skin color, texture, turgor normal.  No rashes or lesions. Neurologic:  Neurovascularly intact without any focal sensory/motor deficits. Cranial nerves: II-XII intact, grossly non-focal.  Psychiatric:  Alert and oriented, thought content appropriate, normal insight  Capillary Refill: Brisk,< 3 seconds   Peripheral Pulses: +2 palpable, equal bilaterally       Labs:     Recent Labs     11/08/20 1922   WBC 7.8   HGB 13.8   HCT 41.2        Recent Labs     11/08/20 1922      K 3.5   CL 99   CO2 22   BUN 10   CREATININE 0.7   CALCIUM 9.2     Recent Labs     11/08/20 1922   AST 41*   ALT 63*   BILITOT 0.4   ALKPHOS 116*     Recent Labs     11/08/20 1922   INR 1.1     Recent Labs     11/08/20  Bernarda Seashore <0.01       Urinalysis:      Lab Results   Component Value Date    NITRU Negative 11/08/2020    WBCUA 2-5 11/08/2020    BACTERIA FEW 11/08/2020    RBCUA 0-1 11/08/2020    BLOODU Negative 11/08/2020    SPECGRAV 1.025 11/08/2020    GLUCOSEU Negative 11/08/2020       Radiology:   Reviewed and documented    CTA PULMONARY W CONTRAST   Final Result   No evidence of pulmonary embolism. Bilateral areas of ground-glass appearance consistent with the patient's   known COVID related illness. XR CHEST PORTABLE   Final Result   No acute process.              ASSESSMENT:    Active Hospital Problems    Diagnosis Date Noted    Pneumonia due to COVID-19 virus [U07.1, J12.89] 11/09/2020    Acute hypoxemic respiratory failure

## 2020-11-09 NOTE — ED PROVIDER NOTES
Patient with presents with increasing shortness of breath and drop in sats. Patient states that she tested positive for Covid on Tuesday. Her PCP gave her steroids however she continued to worsen. She has been spiking fevers despite taking Tylenol and she has been measuring her sats at home and they have been decreasing more more especially when she exerts herself. Her lowest has been 88%. She presents today because she called her PCP who recommend that she be evaluated. Patient also states that she is having fatigue, cough, and malaise. She is a non-smoker does endorse an extensive cardiac history including CHF and dilated cardiomyopathy. She denies any chest pain at this time. She is tachycardic here and is satting in the low 90s at rest.  Patient is denying any nausea, vomiting, abdominal pain, numbness, tingling, or weakness. Patient denies any smoking history. Shortness of Breath   Severity:  Moderate  Onset quality:  Gradual  Duration:  5 days  Timing:  Constant  Progression:  Worsening  Chronicity:  New  Context: activity    Relieved by:  Oxygen  Worsened by:  Nothing  Associated symptoms: cough and fever    Associated symptoms: no abdominal pain, no chest pain, no ear pain, no headaches, no hemoptysis, no rash, no sore throat, no syncope, no vomiting and no wheezing         Review of Systems   Constitutional: Positive for fatigue and fever. Negative for chills. HENT: Negative for ear pain, sinus pressure and sore throat. Eyes: Negative for pain, discharge and redness. Respiratory: Positive for cough and shortness of breath. Negative for hemoptysis and wheezing. Cardiovascular: Negative for chest pain, palpitations, leg swelling and syncope. Gastrointestinal: Negative for abdominal distention, abdominal pain, diarrhea, nausea and vomiting. Genitourinary: Negative for dysuria and frequency. Musculoskeletal: Negative for arthralgias and back pain.    Skin: Negative for rash and wound.   Neurological: Negative for syncope, weakness, numbness and headaches. Hematological: Negative for adenopathy. All other systems reviewed and are negative. Physical Exam  Vitals signs and nursing note reviewed. Constitutional:       General: She is not in acute distress. Appearance: She is well-developed. She is not ill-appearing. HENT:      Head: Normocephalic and atraumatic. Eyes:      Conjunctiva/sclera: Conjunctivae normal.   Neck:      Musculoskeletal: Normal range of motion and neck supple. Cardiovascular:      Rate and Rhythm: Regular rhythm. Tachycardia present. Pulses: Normal pulses. Heart sounds: Normal heart sounds. No murmur. Pulmonary:      Effort: Pulmonary effort is normal. No respiratory distress. Breath sounds: Normal breath sounds. No wheezing or rales. Chest:      Chest wall: No tenderness. Abdominal:      General: Bowel sounds are normal.      Palpations: Abdomen is soft. Tenderness: There is no abdominal tenderness. There is no guarding or rebound. Musculoskeletal:      Right lower leg: No edema. Left lower leg: No edema. Skin:     General: Skin is warm and dry. Neurological:      Mental Status: She is alert and oriented to person, place, and time. Cranial Nerves: No cranial nerve deficit. Motor: No weakness. Coordination: Coordination normal.          Procedures     MDM  Number of Diagnoses or Management Options  Hypoxia:   Lab test positive for detection of COVID-19 virus:   Diagnosis management comments: Patient with positive Covid satting in the low 90s.  6 mg of Decadron was given. She also states that she has had some sats at home in the 80s. CMP is unremarkable outside of some mildly elevated liver enzymes. CBC was unremarkable outside of some depressed lymphocyte counts. This is consistent with her positive Covid test.  Urine was unremarkable. Lactate and troponin were negative.   Patient's coags were that she has never smoked. She has never used smokeless tobacco. She reports that she does not drink alcohol or use drugs. Family History: family history includes Heart Disease in her father; High Blood Pressure in her father. The patients home medications have been reviewed.     Allergies: Codeine; Minocycline; Pcn [penicillins]; and Sulfa antibiotics    -------------------------------------------------- RESULTS -------------------------------------------------    LABS:  Results for orders placed or performed during the hospital encounter of 11/08/20   Comprehensive Metabolic Panel   Result Value Ref Range    Sodium 135 132 - 146 mmol/L    Potassium 3.5 3.5 - 5.0 mmol/L    Chloride 99 98 - 107 mmol/L    CO2 22 22 - 29 mmol/L    Anion Gap 14 7 - 16 mmol/L    Glucose 125 (H) 74 - 99 mg/dL    BUN 10 6 - 20 mg/dL    CREATININE 0.7 0.5 - 1.0 mg/dL    GFR Non-African American >60 >=60 mL/min/1.73    GFR African American >60     Calcium 9.2 8.6 - 10.2 mg/dL    Total Protein 7.5 6.4 - 8.3 g/dL    Alb 3.8 3.5 - 5.2 g/dL    Total Bilirubin 0.4 0.0 - 1.2 mg/dL    Alkaline Phosphatase 116 (H) 35 - 104 U/L    ALT 63 (H) 0 - 32 U/L    AST 41 (H) 0 - 31 U/L   CBC Auto Differential   Result Value Ref Range    WBC 7.8 4.5 - 11.5 E9/L    RBC 4.73 3.50 - 5.50 E12/L    Hemoglobin 13.8 11.5 - 15.5 g/dL    Hematocrit 41.2 34.0 - 48.0 %    MCV 87.1 80.0 - 99.9 fL    MCH 29.2 26.0 - 35.0 pg    MCHC 33.5 32.0 - 34.5 %    RDW 12.8 11.5 - 15.0 fL    Platelets 751 175 - 550 E9/L    MPV 9.5 7.0 - 12.0 fL    Neutrophils % 78.4 43.0 - 80.0 %    Immature Granulocytes % 1.1 0.0 - 5.0 %    Lymphocytes % 13.9 (L) 20.0 - 42.0 %    Monocytes % 6.5 2.0 - 12.0 %    Eosinophils % 0.0 0.0 - 6.0 %    Basophils % 0.1 0.0 - 2.0 %    Neutrophils Absolute 6.14 1.80 - 7.30 E9/L    Immature Granulocytes # 0.09 E9/L    Lymphocytes Absolute 1.09 (L) 1.50 - 4.00 E9/L    Monocytes Absolute 0.51 0.10 - 0.95 E9/L    Eosinophils Absolute 0.00 (L) 0.05 - 0.50 E9/L    Basophils Absolute 0.01 0.00 - 0.20 E9/L   Urinalysis   Result Value Ref Range    Color, UA Yellow Straw/Yellow    Clarity, UA Clear Clear    Glucose, Ur Negative Negative mg/dL    Bilirubin Urine Negative Negative    Ketones, Urine Negative Negative mg/dL    Specific Gravity, UA 1.025 1.005 - 1.030    Blood, Urine Negative Negative    pH, UA 6.0 5.0 - 9.0    Protein, UA TRACE Negative mg/dL    Urobilinogen, Urine >=8.0 (A) <2.0 E.U./dL    Nitrite, Urine Negative Negative    Leukocyte Esterase, Urine Negative Negative   Brain Natriuretic Peptide   Result Value Ref Range    Pro- (H) 0 - 125 pg/mL   Lactic Acid, Plasma   Result Value Ref Range    Lactic Acid 0.9 0.5 - 2.2 mmol/L   Troponin   Result Value Ref Range    Troponin <0.01 0.00 - 0.03 ng/mL   Protime-INR   Result Value Ref Range    Protime 12.3 9.3 - 12.4 sec    INR 1.1    Microscopic Urinalysis   Result Value Ref Range    WBC, UA 2-5 0 - 5 /HPF    RBC, UA 0-1 0 - 2 /HPF    Bacteria, UA FEW (A) None Seen /HPF   EKG 12 Lead   Result Value Ref Range    Ventricular Rate 98 BPM    Atrial Rate 98 BPM    P-R Interval 206 ms    QRS Duration 106 ms    Q-T Interval 348 ms    QTc Calculation (Bazett) 444 ms    P Axis 28 degrees    R Axis -41 degrees    T Axis 58 degrees       RADIOLOGY:  CTA PULMONARY W CONTRAST   Final Result   No evidence of pulmonary embolism. Bilateral areas of ground-glass appearance consistent with the patient's   known COVID related illness. XR CHEST PORTABLE   Final Result   No acute process. ------------------------- NURSING NOTES AND VITALS REVIEWED ---------------------------  Date / Time Roomed:  11/8/2020  7:29 PM  ED Bed Assignment:  07/07    The nursing notes within the ED encounter and vital signs as below have been reviewed.      Patient Vitals for the past 24 hrs:   BP Temp Pulse Resp SpO2 Height Weight   11/08/20 2347 (!) 144/90 -- 94 25 92 % -- --   11/08/20 1917 (!) 150/100 98 °F (36.7 °C) 108 18 90 % 5' 6\" (1.676 m) 180 lb (81.6 kg)   11/08/20 1857 -- 98 °F (36.7 °C) 116 -- 93 % -- --       Oxygen Saturation Interpretation: Abnormal    ------------------------------------------ PROGRESS NOTES ------------------------------------------  Re-evaluation(s):  Time: 0030  Patients symptoms show no change  Repeat physical examination is not changed    Counseling:  I have spoken with the patient and discussed todays results, in addition to providing specific details for the plan of care and counseling regarding the diagnosis and prognosis. Their questions are answered at this time and they are agreeable with the plan of admission.    --------------------------------- ADDITIONAL PROVIDER NOTES ---------------------------------  Consultations:  Time: 1243. Spoke with Dr. Shazia Ventura. Discussed case. They will admit the patient. This patient's ED course included: a personal history and physicial examination, re-evaluation prior to disposition, cardiac monitoring and continuous pulse oximetry    This patient has remained hemodynamically stable during their ED course. Diagnosis:  1. Hypoxia    2. Lab test positive for detection of COVID-19 virus        Disposition:  Patient's disposition: Admit to telemetry  Patient's condition is stable.              Ilsa Hernandez,   Resident  11/09/20 4599

## 2020-11-09 NOTE — ACP (ADVANCE CARE PLANNING)
Advance Care Planning     Advance Care Planning Activator (Inpatient)  Conversation Note      Date of ACP Conversation: 11/8/2020    Conversation Conducted with: Patient with Decision Making Capacity    ACP Activator: Bo Grissom    *When Decision Maker makes decisions on behalf of the incapacitated patient: Decision Maker is asked to consider and make decisions based on patient values, known preferences, or best interests. Health Care Decision Maker:     Current Designated Health Care Decision Maker:   (If there is a valid [address] named in the [address] [place] [address] Makers\" box in the ACP activity, but it is not visible above, be sure to open that field and then select the health care decision maker relationship (ie \"primary\") in the blank space to the right of the name.) Validate  this information as still accurate & up-to-date; edit [address] field as needed.)    Note: Assess and validate information in current ACP documents, as indicated. If no Decision Maker listed above or available through scanned documents, then:    If no Authorized Decision Maker has previously been identified, then patient chooses [address]:  \"Who would you like to name as your primary health care decision-maker? \"               Name: Gisell Bustos        Relationship: Daughter          Phone number: [phone]  Brittani Hui this person be reached easily? \" Yes  \"Who would you like to name as your back-up decision maker? \"   Name: Asher Lopez        Relationship: Son in-law          Phone number: [phone]  Brittani Hui this person be reached easily? \" Yes    Note: If the relationship of these Decision-Makers to the patient does NOT follow your state's Next of Kin hierarchy, recommend that patient complete ACP document that meets state-specific requirements to allow them to act on the patient's behalf when appropriate. Care Preferences    Ventilation:   \"If you were in your present state of signature  [] POLST/POST/MOLST/MOST prepared for Provider review and signature      Follow-up plan:    [] Schedule follow-up conversation to continue planning  [] Referred individual to Provider for additional questions/concerns   [] Advised patient/agent/surrogate to review completed ACP document and update if needed with changes in condition, patient preferences or care setting    [] This note routed to one or more involved healthcare providers

## 2020-11-10 LAB
ALBUMIN SERPL-MCNC: 3.5 G/DL (ref 3.5–5.2)
ALP BLD-CCNC: 103 U/L (ref 35–104)
ALT SERPL-CCNC: 53 U/L (ref 0–32)
ANION GAP SERPL CALCULATED.3IONS-SCNC: 9 MMOL/L (ref 7–16)
AST SERPL-CCNC: 31 U/L (ref 0–31)
BASOPHILS ABSOLUTE: 0.02 E9/L (ref 0–0.2)
BASOPHILS RELATIVE PERCENT: 0.3 % (ref 0–2)
BILIRUB SERPL-MCNC: 0.2 MG/DL (ref 0–1.2)
BUN BLDV-MCNC: 15 MG/DL (ref 6–20)
CALCIUM SERPL-MCNC: 8.8 MG/DL (ref 8.6–10.2)
CHLORIDE BLD-SCNC: 103 MMOL/L (ref 98–107)
CO2: 26 MMOL/L (ref 22–29)
CREAT SERPL-MCNC: 0.6 MG/DL (ref 0.5–1)
D DIMER: 468 NG/ML DDU
EOSINOPHILS ABSOLUTE: 0.01 E9/L (ref 0.05–0.5)
EOSINOPHILS RELATIVE PERCENT: 0.1 % (ref 0–6)
FIBRINOGEN: 648 MG/DL (ref 225–540)
GFR AFRICAN AMERICAN: >60
GFR NON-AFRICAN AMERICAN: >60 ML/MIN/1.73
GLUCOSE BLD-MCNC: 116 MG/DL (ref 74–99)
HCT VFR BLD CALC: 36 % (ref 34–48)
HEMOGLOBIN: 12.1 G/DL (ref 11.5–15.5)
IMMATURE GRANULOCYTES #: 0.07 E9/L
IMMATURE GRANULOCYTES %: 0.9 % (ref 0–5)
LYMPHOCYTES ABSOLUTE: 1.46 E9/L (ref 1.5–4)
LYMPHOCYTES RELATIVE PERCENT: 18.9 % (ref 20–42)
MCH RBC QN AUTO: 29.6 PG (ref 26–35)
MCHC RBC AUTO-ENTMCNC: 33.6 % (ref 32–34.5)
MCV RBC AUTO: 88 FL (ref 80–99.9)
MONOCYTES ABSOLUTE: 0.56 E9/L (ref 0.1–0.95)
MONOCYTES RELATIVE PERCENT: 7.2 % (ref 2–12)
NEUTROPHILS ABSOLUTE: 5.61 E9/L (ref 1.8–7.3)
NEUTROPHILS RELATIVE PERCENT: 72.6 % (ref 43–80)
PDW BLD-RTO: 12.9 FL (ref 11.5–15)
PLATELET # BLD: 250 E9/L (ref 130–450)
PMV BLD AUTO: 9.6 FL (ref 7–12)
POTASSIUM REFLEX MAGNESIUM: 3.9 MMOL/L (ref 3.5–5)
RBC # BLD: 4.09 E12/L (ref 3.5–5.5)
SODIUM BLD-SCNC: 138 MMOL/L (ref 132–146)
TOTAL PROTEIN: 6.6 G/DL (ref 6.4–8.3)
TROPONIN: <0.01 NG/ML (ref 0–0.03)
TROPONIN: <0.01 NG/ML (ref 0–0.03)
URINE CULTURE, ROUTINE: NORMAL
WBC # BLD: 7.7 E9/L (ref 4.5–11.5)

## 2020-11-10 PROCEDURE — 6370000000 HC RX 637 (ALT 250 FOR IP): Performed by: FAMILY MEDICINE

## 2020-11-10 PROCEDURE — 2500000003 HC RX 250 WO HCPCS: Performed by: SPECIALIST

## 2020-11-10 PROCEDURE — 83520 IMMUNOASSAY QUANT NOS NONAB: CPT

## 2020-11-10 PROCEDURE — 87449 NOS EACH ORGANISM AG IA: CPT

## 2020-11-10 PROCEDURE — 2580000003 HC RX 258: Performed by: INTERNAL MEDICINE

## 2020-11-10 PROCEDURE — 2140000000 HC CCU INTERMEDIATE R&B

## 2020-11-10 PROCEDURE — 87450 HC DIRECT STREP B ANTIGEN: CPT

## 2020-11-10 PROCEDURE — 85025 COMPLETE CBC W/AUTO DIFF WBC: CPT

## 2020-11-10 PROCEDURE — 85378 FIBRIN DEGRADE SEMIQUANT: CPT

## 2020-11-10 PROCEDURE — 84484 ASSAY OF TROPONIN QUANT: CPT

## 2020-11-10 PROCEDURE — 6370000000 HC RX 637 (ALT 250 FOR IP): Performed by: INTERNAL MEDICINE

## 2020-11-10 PROCEDURE — 36415 COLL VENOUS BLD VENIPUNCTURE: CPT

## 2020-11-10 PROCEDURE — 6370000000 HC RX 637 (ALT 250 FOR IP): Performed by: SPECIALIST

## 2020-11-10 PROCEDURE — 80053 COMPREHEN METABOLIC PANEL: CPT

## 2020-11-10 PROCEDURE — 2580000003 HC RX 258: Performed by: SPECIALIST

## 2020-11-10 PROCEDURE — 6360000002 HC RX W HCPCS: Performed by: FAMILY MEDICINE

## 2020-11-10 PROCEDURE — 85384 FIBRINOGEN ACTIVITY: CPT

## 2020-11-10 PROCEDURE — 2580000003 HC RX 258

## 2020-11-10 RX ORDER — HYDROXYZINE PAMOATE 50 MG/1
50 CAPSULE ORAL EVERY 8 HOURS PRN
Status: DISCONTINUED | OUTPATIENT
Start: 2020-11-10 | End: 2020-11-14 | Stop reason: HOSPADM

## 2020-11-10 RX ORDER — SODIUM CHLORIDE 0.9 % (FLUSH) 0.9 %
SYRINGE (ML) INJECTION
Status: COMPLETED
Start: 2020-11-10 | End: 2020-11-10

## 2020-11-10 RX ORDER — SODIUM CHLORIDE 0.9 % (FLUSH) 0.9 %
10 SYRINGE (ML) INJECTION EVERY 12 HOURS SCHEDULED
Status: DISCONTINUED | OUTPATIENT
Start: 2020-11-10 | End: 2020-11-10

## 2020-11-10 RX ORDER — SODIUM CHLORIDE 0.9 % (FLUSH) 0.9 %
10 SYRINGE (ML) INJECTION PRN
Status: DISCONTINUED | OUTPATIENT
Start: 2020-11-10 | End: 2020-11-14 | Stop reason: HOSPADM

## 2020-11-10 RX ORDER — SODIUM CHLORIDE 0.9 % (FLUSH) 0.9 %
10 SYRINGE (ML) INJECTION PRN
Status: DISCONTINUED | OUTPATIENT
Start: 2020-11-10 | End: 2020-11-10

## 2020-11-10 RX ORDER — TRAZODONE HYDROCHLORIDE 50 MG/1
50 TABLET ORAL NIGHTLY
Status: DISCONTINUED | OUTPATIENT
Start: 2020-11-10 | End: 2020-11-14 | Stop reason: HOSPADM

## 2020-11-10 RX ORDER — SODIUM CHLORIDE 0.9 % (FLUSH) 0.9 %
10 SYRINGE (ML) INJECTION 2 TIMES DAILY
Status: DISCONTINUED | OUTPATIENT
Start: 2020-11-10 | End: 2020-11-14 | Stop reason: HOSPADM

## 2020-11-10 RX ORDER — ONDANSETRON 2 MG/ML
4 INJECTION INTRAMUSCULAR; INTRAVENOUS EVERY 6 HOURS PRN
Status: DISCONTINUED | OUTPATIENT
Start: 2020-11-10 | End: 2020-11-14 | Stop reason: HOSPADM

## 2020-11-10 RX ADMIN — ATORVASTATIN CALCIUM 20 MG: 20 TABLET, FILM COATED ORAL at 21:26

## 2020-11-10 RX ADMIN — HYDROXYZINE PAMOATE 50 MG: 50 CAPSULE ORAL at 19:19

## 2020-11-10 RX ADMIN — ONDANSETRON 4 MG: 2 INJECTION INTRAMUSCULAR; INTRAVENOUS at 06:34

## 2020-11-10 RX ADMIN — TRAZODONE HYDROCHLORIDE 50 MG: 50 TABLET ORAL at 21:27

## 2020-11-10 RX ADMIN — ACETAMINOPHEN 650 MG: 325 TABLET ORAL at 07:45

## 2020-11-10 RX ADMIN — GUAIFENESIN AND DEXTROMETHORPHAN 5 ML: 100; 10 SYRUP ORAL at 09:56

## 2020-11-10 RX ADMIN — SODIUM CHLORIDE, PRESERVATIVE FREE 10 ML: 5 INJECTION INTRAVENOUS at 18:12

## 2020-11-10 RX ADMIN — SODIUM CHLORIDE, PRESERVATIVE FREE 10 ML: 5 INJECTION INTRAVENOUS at 22:05

## 2020-11-10 RX ADMIN — REMDESIVIR 100 MG: 100 INJECTION, POWDER, LYOPHILIZED, FOR SOLUTION INTRAVENOUS at 18:11

## 2020-11-10 RX ADMIN — Medication 6 MG: at 09:56

## 2020-11-10 RX ADMIN — PANTOPRAZOLE SODIUM 40 MG: 40 TABLET, DELAYED RELEASE ORAL at 06:34

## 2020-11-10 RX ADMIN — SODIUM CHLORIDE, PRESERVATIVE FREE 10 ML: 5 INJECTION INTRAVENOUS at 06:34

## 2020-11-10 RX ADMIN — ENOXAPARIN SODIUM 30 MG: 30 INJECTION SUBCUTANEOUS at 09:56

## 2020-11-10 RX ADMIN — ENOXAPARIN SODIUM 30 MG: 30 INJECTION SUBCUTANEOUS at 21:26

## 2020-11-10 RX ADMIN — GUAIFENESIN AND DEXTROMETHORPHAN 5 ML: 100; 10 SYRUP ORAL at 23:23

## 2020-11-10 RX ADMIN — ACETAMINOPHEN 650 MG: 325 TABLET ORAL at 18:11

## 2020-11-10 RX ADMIN — FLUTICASONE PROPIONATE 1 SPRAY: 50 SPRAY, METERED NASAL at 09:58

## 2020-11-10 RX ADMIN — BUDESONIDE AND FORMOTEROL FUMARATE DIHYDRATE 2 PUFF: 80; 4.5 AEROSOL RESPIRATORY (INHALATION) at 09:58

## 2020-11-10 RX ADMIN — GUAIFENESIN AND DEXTROMETHORPHAN 5 ML: 100; 10 SYRUP ORAL at 18:11

## 2020-11-10 RX ADMIN — DEXAMETHASONE 6 MG: 4 TABLET ORAL at 09:57

## 2020-11-10 RX ADMIN — BUDESONIDE AND FORMOTEROL FUMARATE DIHYDRATE 2 PUFF: 80; 4.5 AEROSOL RESPIRATORY (INHALATION) at 19:43

## 2020-11-10 RX ADMIN — OXYCODONE HYDROCHLORIDE AND ACETAMINOPHEN 500 MG: 500 TABLET ORAL at 09:57

## 2020-11-10 RX ADMIN — VALSARTAN 160 MG: 160 TABLET, FILM COATED ORAL at 09:57

## 2020-11-10 RX ADMIN — SODIUM CHLORIDE, PRESERVATIVE FREE 10 ML: 5 INJECTION INTRAVENOUS at 10:04

## 2020-11-10 RX ADMIN — CARVEDILOL 3.12 MG: 3.12 TABLET, FILM COATED ORAL at 09:57

## 2020-11-10 RX ADMIN — CARVEDILOL 3.12 MG: 3.12 TABLET, FILM COATED ORAL at 18:11

## 2020-11-10 RX ADMIN — OXYCODONE HYDROCHLORIDE AND ACETAMINOPHEN 500 MG: 500 TABLET ORAL at 21:26

## 2020-11-10 RX ADMIN — Medication 50 MG: at 09:57

## 2020-11-10 ASSESSMENT — PAIN SCALES - GENERAL
PAINLEVEL_OUTOF10: 0
PAINLEVEL_OUTOF10: 0
PAINLEVEL_OUTOF10: 6
PAINLEVEL_OUTOF10: 0

## 2020-11-10 ASSESSMENT — PAIN DESCRIPTION - LOCATION
LOCATION: BACK

## 2020-11-10 ASSESSMENT — PAIN DESCRIPTION - PAIN TYPE
TYPE: CHRONIC PAIN
TYPE: CHRONIC PAIN

## 2020-11-10 ASSESSMENT — PAIN DESCRIPTION - ORIENTATION
ORIENTATION: LOWER

## 2020-11-10 ASSESSMENT — PAIN DESCRIPTION - ONSET
ONSET: ON-GOING

## 2020-11-10 ASSESSMENT — PAIN DESCRIPTION - DESCRIPTORS
DESCRIPTORS: ACHING;CONSTANT;PRESSURE;STABBING
DESCRIPTORS: ACHING;CONSTANT;DISCOMFORT

## 2020-11-10 ASSESSMENT — PAIN DESCRIPTION - FREQUENCY
FREQUENCY: INTERMITTENT
FREQUENCY: INTERMITTENT

## 2020-11-10 ASSESSMENT — PAIN DESCRIPTION - PROGRESSION
CLINICAL_PROGRESSION: NOT CHANGED
CLINICAL_PROGRESSION: NOT CHANGED

## 2020-11-10 NOTE — PROGRESS NOTES
[address] [place] [address] [facility]  Progress Note      Chief Complaint   Patient presents with    Concern For COVID-19     pt is covid + , c/o dizziness / weakness/ SOB when walking        SUBJECTIVE:  Patient is tolerating medications. No reported adverse drug reactions. No nausea, vomiting, diarrhea. Tmax 100.1 this am. Has nausea. No sob like she had at home however she is not moving around much. Reports she notices heaviness chest. She is starting to get anxious being enclosed in hospital room. 94% on room air. Has cough with white mucus. Review of systems:  As stated above in the chief complaint, otherwise negative. Medications:  Scheduled Meds:   sodium chloride flush  10 mL Intravenous 2 times per day    zinc sulfate  50 mg Oral Daily    vitamin C  500 mg Oral BID    dexamethasone  6 mg Oral Daily    atorvastatin  20 mg Oral Nightly    carvedilol  3.125 mg Oral BID WC    fluticasone  1 spray Nasal Daily    budesonide-formoterol  2 puff Inhalation BID    pantoprazole  40 mg Oral QAM AC    valsartan  160 mg Oral Daily    enoxaparin  30 mg Subcutaneous BID    melatonin  6 mg Oral Daily    remdesivir IVPB  100 mg Intravenous Q24H     Continuous Infusions:  PRN Meds:ondansetron, sodium chloride flush, acetaminophen **OR** acetaminophen, albuterol sulfate HFA, guaiFENesin-dextromethorphan, sodium chloride, iopamidol    OBJECTIVE:  /64 Comment: manual  Pulse 67   Temp 97.8 °F (36.6 °C) (Oral)   Resp 16   Ht 5' 6\" (1.676 m)   Wt 195 lb (88.5 kg)   SpO2 94%   BMI 31.47 kg/m²   Temp  Av.3 °F (36.8 °C)  Min: 96.8 °F (36 °C)  Max: 100.3 °F (37.9 °C)  Constitutional: The patient is awake, alert, and oriented. Skin: Warm and dry. No rashes were noted. HEENT:  Moist mucous membranes. No ulcerations or thrush. Neck: Supple to movements. Chest: No use of accessory muscles to breathe. Symmetrical expansion. No wheezing, crackles or rhonchi.   Cardiovascular: S1 and S2 are rhythmic and regular. No murmurs appreciated. Abdomen: Positive bowel sounds to auscultation. Benign to palpation. No masses felt. No hepatosplenomegaly. Genitourinary: female  Extremities: No clubbing, no cyanosis, no edema.   Lines: peripheral    Laboratory and Tests Review:  Lab Results   Component Value Date    WBC 7.7 11/10/2020    WBC 4.9 11/09/2020    WBC 7.8 11/08/2020    HGB 12.1 11/10/2020    HCT 36.0 11/10/2020    MCV 88.0 11/10/2020     11/10/2020     Lab Results   Component Value Date    NEUTROABS 5.61 11/10/2020    NEUTROABS 3.44 11/09/2020    NEUTROABS 6.14 11/08/2020     No results found for: CRPHS  Lab Results   Component Value Date    ALT 53 (H) 11/10/2020    AST 31 11/10/2020    ALKPHOS 103 11/10/2020    BILITOT 0.2 11/10/2020     Lab Results   Component Value Date     11/10/2020    K 3.9 11/10/2020     11/10/2020    CO2 26 11/10/2020    BUN 15 11/10/2020    CREATININE 0.6 11/10/2020    CREATININE 0.6 11/09/2020    CREATININE 0.7 11/08/2020    GFRAA >60 11/10/2020    LABGLOM >60 11/10/2020    GLUCOSE 116 11/10/2020    PROT 6.6 11/10/2020    LABALBU 3.5 11/10/2020    CALCIUM 8.8 11/10/2020    BILITOT 0.2 11/10/2020    ALKPHOS 103 11/10/2020    AST 31 11/10/2020    ALT 53 11/10/2020     Lab Results   Component Value Date    CRP 13.7 (H) 11/09/2020     No results found for: [address]  Radiology:      Microbiology:   Lab Results   Component Value Date    BC 24 Hours no growth 11/08/2020     Lab Results   Component Value Date    BLOODCULT2 24 Hours no growth 11/08/2020    BLOODCULT2 5 Days- no growth 03/26/2014    BLOODCULT2 5 Days- no growth 03/26/2014     No results found for: WNDABS  No results found for: RESPSMEAR  No results found for: MPNEUMO, CLAMYDCU, LABLEGI, AFBCX, FUNGSM, LABFUNG  No results found for: CULTRESP  No results found for: CXCATHTIP  No results found for: BFCS  No results found for: CXSURG  Urine Culture, Routine   Date Value Ref Range Status 11/08/2020   Final    <10,000 CFU/mL  Mixed gram positive organisms  Mixed gram positive cocci       No results found for: [facility]      Assessment:  · Covid pneumonia severe    Plan:    ·  remdesivir day 2, vitamin C minerals and melatonin  · DC Levaquin  · Check cultures  · Monitor labs  · Will follow with you    Thank you for having us see this patient in consultation  Argenis Duran  3:34 PM  11/10/2020     Pt seen and examined. Above discussed agree with advanced practice nurse. Labs, cultures, and radiographs reviewed. Face to Face encounter occurred. Changes made as necessary.      Lauren Ellis MD

## 2020-11-10 NOTE — PLAN OF CARE
Problem: Falls - Risk of:  Goal: Will remain free from falls  Description: Will remain free from falls  Outcome: Met This Shift  Goal: Absence of physical injury  Description: Absence of physical injury  Outcome: Met This Shift     Problem: Pain:  Goal: Pain level will decrease  Description: Pain level will decrease  Outcome: Met This Shift  Goal: Control of acute pain  Description: Control of acute pain  Outcome: Met This Shift  Goal: Control of chronic pain  Description: Control of chronic pain  Outcome: Met This Shift     Problem: Airway Clearance - Ineffective  Goal: Achieve or maintain patent airway  Outcome: Met This Shift     Problem: Gas Exchange - Impaired  Goal: Absence of hypoxia  Outcome: Met This Shift  Goal: Promote optimal lung function  Outcome: Met This Shift     Problem: Breathing Pattern - Ineffective  Goal: Ability to achieve and maintain a regular respiratory rate  Outcome: Met This Shift     Problem:  Body Temperature -  Risk of, Imbalanced  Goal: Ability to maintain a body temperature within defined limits  Outcome: Met This Shift  Goal: Will regain or maintain usual level of consciousness  Outcome: Met This Shift  Goal: Complications related to the disease process, condition or treatment will be avoided or minimized  Outcome: Met This Shift     Problem: Isolation Precautions - Risk of Spread of Infection  Goal: Prevent transmission of infection  Outcome: Met This Shift     Problem: Nutrition Deficits  Goal: Optimize nutrtional status  Outcome: Met This Shift     Problem: Risk for Fluid Volume Deficit  Goal: Maintain normal heart rhythm  Outcome: Met This Shift  Goal: Maintain absence of muscle cramping  Outcome: Met This Shift  Goal: Maintain normal serum potassium, sodium, calcium, phosphorus, and pH  Outcome: Met This Shift     Problem: Loneliness or Risk for Loneliness  Goal: Demonstrate positive use of time alone when socialization is not possible  Outcome: Met This Shift     Problem: Fatigue  Goal: Verbalize increase energy and improved vitality  Outcome: Met This Shift     Problem: Patient Education: Go to Patient Education Activity  Goal: Patient/Family Education  Outcome: Met This Shift

## 2020-11-10 NOTE — PROGRESS NOTES
Daily    remdesivir IVPB  100 mg Intravenous Q24H     PRN Meds: ondansetron, sodium chloride flush, acetaminophen **OR** acetaminophen, albuterol sulfate HFA, guaiFENesin-dextromethorphan, sodium chloride, iopamidol    Labs:     Recent Labs     11/08/20 1922 11/09/20  1020 11/10/20  0420   WBC 7.8 4.9 7.7   HGB 13.8 12.6 12.1   HCT 41.2 37.2 36.0    242 250       Recent Labs     11/08/20 1922 11/09/20  1020 11/10/20  0420    139 138   K 3.5 3.2* 3.9   CL 99 104 103   CO2 22 28 26   BUN 10 11 15   CREATININE 0.7 0.6 0.6   CALCIUM 9.2 9.0 8.8       Recent Labs     11/08/20 1922 11/09/20  1020 11/10/20  0420   PROT 7.5 7.4 6.6   ALKPHOS 116* 103 103   ALT 63* 52* 53*   AST 41* 34* 31   BILITOT 0.4 0.4 0.2       Recent Labs     11/08/20 1922   INR 1.1       Recent Labs     11/09/20  1020 11/10/20  0420 11/10/20  1330   TROPONINI <0.01 <0.01 <0.01       Chronic labs:    Lab Results   Component Value Date    CHOL 246 (H) 03/27/2014    TRIG 114 03/27/2014    HDL 60 03/27/2014    LDLCALC 163 (H) 03/27/2014    TSH 1.610 03/26/2014    INR 1.1 11/08/2020       Radiology: REVIEWED DAILY    +++++++++++++++++++++++++++++++++++++++++++++++++  [address], [place]  +++++++++++++++++++++++++++++++++++++++++++++++++  NOTE: This report was transcribed using voice recognition software. Every effort was made to ensure accuracy; however, inadvertent computerized transcription errors may be present.

## 2020-11-11 LAB
ALBUMIN SERPL-MCNC: 3.3 G/DL (ref 3.5–5.2)
ALP BLD-CCNC: 97 U/L (ref 35–104)
ALT SERPL-CCNC: 52 U/L (ref 0–32)
ANION GAP SERPL CALCULATED.3IONS-SCNC: 9 MMOL/L (ref 7–16)
AST SERPL-CCNC: 31 U/L (ref 0–31)
BASOPHILS ABSOLUTE: 0.01 E9/L (ref 0–0.2)
BASOPHILS RELATIVE PERCENT: 0.2 % (ref 0–2)
BILIRUB SERPL-MCNC: 0.2 MG/DL (ref 0–1.2)
BUN BLDV-MCNC: 16 MG/DL (ref 6–20)
CALCIUM SERPL-MCNC: 8.7 MG/DL (ref 8.6–10.2)
CHLORIDE BLD-SCNC: 103 MMOL/L (ref 98–107)
CO2: 25 MMOL/L (ref 22–29)
CREAT SERPL-MCNC: 0.6 MG/DL (ref 0.5–1)
EOSINOPHILS ABSOLUTE: 0 E9/L (ref 0.05–0.5)
EOSINOPHILS RELATIVE PERCENT: 0 % (ref 0–6)
GFR AFRICAN AMERICAN: >60
GFR NON-AFRICAN AMERICAN: >60 ML/MIN/1.73
GLUCOSE BLD-MCNC: 176 MG/DL (ref 74–99)
HCT VFR BLD CALC: 35.9 % (ref 34–48)
HEMOGLOBIN: 11.6 G/DL (ref 11.5–15.5)
IMMATURE GRANULOCYTES #: 0.08 E9/L
IMMATURE GRANULOCYTES %: 1.2 % (ref 0–5)
L. PNEUMOPHILA SEROGP 1 UR AG: NORMAL
LYMPHOCYTES ABSOLUTE: 1.23 E9/L (ref 1.5–4)
LYMPHOCYTES RELATIVE PERCENT: 19.1 % (ref 20–42)
MCH RBC QN AUTO: 28.7 PG (ref 26–35)
MCHC RBC AUTO-ENTMCNC: 32.3 % (ref 32–34.5)
MCV RBC AUTO: 88.9 FL (ref 80–99.9)
MONOCYTES ABSOLUTE: 0.4 E9/L (ref 0.1–0.95)
MONOCYTES RELATIVE PERCENT: 6.2 % (ref 2–12)
NEUTROPHILS ABSOLUTE: 4.72 E9/L (ref 1.8–7.3)
NEUTROPHILS RELATIVE PERCENT: 73.3 % (ref 43–80)
PDW BLD-RTO: 12.8 FL (ref 11.5–15)
PLATELET # BLD: 255 E9/L (ref 130–450)
PMV BLD AUTO: 9.6 FL (ref 7–12)
POTASSIUM REFLEX MAGNESIUM: 3.6 MMOL/L (ref 3.5–5)
RBC # BLD: 4.04 E12/L (ref 3.5–5.5)
SODIUM BLD-SCNC: 137 MMOL/L (ref 132–146)
STREP PNEUMONIAE ANTIGEN, URINE: NORMAL
TOTAL PROTEIN: 6.3 G/DL (ref 6.4–8.3)
WBC # BLD: 6.4 E9/L (ref 4.5–11.5)

## 2020-11-11 PROCEDURE — 6370000000 HC RX 637 (ALT 250 FOR IP): Performed by: INTERNAL MEDICINE

## 2020-11-11 PROCEDURE — 2580000003 HC RX 258: Performed by: SPECIALIST

## 2020-11-11 PROCEDURE — 80053 COMPREHEN METABOLIC PANEL: CPT

## 2020-11-11 PROCEDURE — 6370000000 HC RX 637 (ALT 250 FOR IP): Performed by: SPECIALIST

## 2020-11-11 PROCEDURE — 85025 COMPLETE CBC W/AUTO DIFF WBC: CPT

## 2020-11-11 PROCEDURE — 2500000003 HC RX 250 WO HCPCS: Performed by: SPECIALIST

## 2020-11-11 PROCEDURE — 36415 COLL VENOUS BLD VENIPUNCTURE: CPT

## 2020-11-11 PROCEDURE — 2140000000 HC CCU INTERMEDIATE R&B

## 2020-11-11 PROCEDURE — 6370000000 HC RX 637 (ALT 250 FOR IP): Performed by: FAMILY MEDICINE

## 2020-11-11 PROCEDURE — 2580000003 HC RX 258: Performed by: INTERNAL MEDICINE

## 2020-11-11 PROCEDURE — 6360000002 HC RX W HCPCS: Performed by: FAMILY MEDICINE

## 2020-11-11 PROCEDURE — 2700000000 HC OXYGEN THERAPY PER DAY

## 2020-11-11 RX ORDER — MONTELUKAST SODIUM 10 MG/1
10 TABLET ORAL NIGHTLY
Status: DISCONTINUED | OUTPATIENT
Start: 2020-11-11 | End: 2020-11-14 | Stop reason: HOSPADM

## 2020-11-11 RX ORDER — OXYBUTYNIN CHLORIDE 15 MG/1
15 TABLET, EXTENDED RELEASE ORAL DAILY
COMMUNITY

## 2020-11-11 RX ORDER — CARVEDILOL 6.25 MG/1
6.25 TABLET ORAL 2 TIMES DAILY WITH MEALS
Status: DISCONTINUED | OUTPATIENT
Start: 2020-11-11 | End: 2020-11-14 | Stop reason: HOSPADM

## 2020-11-11 RX ORDER — METHYLPHENIDATE HYDROCHLORIDE 10 MG/1
10 TABLET ORAL 3 TIMES DAILY PRN
COMMUNITY

## 2020-11-11 RX ORDER — POTASSIUM CHLORIDE 20 MEQ/1
20 TABLET, EXTENDED RELEASE ORAL DAILY PRN
COMMUNITY

## 2020-11-11 RX ORDER — GABAPENTIN 300 MG/1
300 CAPSULE ORAL NIGHTLY PRN
Status: DISCONTINUED | OUTPATIENT
Start: 2020-11-11 | End: 2020-11-14 | Stop reason: HOSPADM

## 2020-11-11 RX ORDER — POTASSIUM CHLORIDE 20 MEQ/1
20 TABLET, EXTENDED RELEASE ORAL 2 TIMES DAILY WITH MEALS
Status: DISCONTINUED | OUTPATIENT
Start: 2020-11-11 | End: 2020-11-13

## 2020-11-11 RX ORDER — GABAPENTIN 300 MG/1
300 CAPSULE ORAL NIGHTLY PRN
COMMUNITY

## 2020-11-11 RX ORDER — PANTOPRAZOLE SODIUM 40 MG/1
40 TABLET, DELAYED RELEASE ORAL DAILY
COMMUNITY

## 2020-11-11 RX ORDER — SUCRALFATE 1 G/1
1 TABLET ORAL 4 TIMES DAILY PRN
Status: DISCONTINUED | OUTPATIENT
Start: 2020-11-11 | End: 2020-11-14 | Stop reason: HOSPADM

## 2020-11-11 RX ORDER — OXYBUTYNIN CHLORIDE 5 MG/1
15 TABLET, EXTENDED RELEASE ORAL DAILY
Status: DISCONTINUED | OUTPATIENT
Start: 2020-11-11 | End: 2020-11-14 | Stop reason: HOSPADM

## 2020-11-11 RX ORDER — SUCRALFATE 1 G/1
1 TABLET ORAL
COMMUNITY

## 2020-11-11 RX ORDER — VALSARTAN 80 MG/1
80 TABLET ORAL DAILY
Status: DISCONTINUED | OUTPATIENT
Start: 2020-11-11 | End: 2020-11-14 | Stop reason: HOSPADM

## 2020-11-11 RX ADMIN — SODIUM CHLORIDE, PRESERVATIVE FREE 10 ML: 5 INJECTION INTRAVENOUS at 10:00

## 2020-11-11 RX ADMIN — SODIUM CHLORIDE, PRESERVATIVE FREE 10 ML: 5 INJECTION INTRAVENOUS at 19:34

## 2020-11-11 RX ADMIN — VALSARTAN 80 MG: 160 TABLET, FILM COATED ORAL at 09:59

## 2020-11-11 RX ADMIN — DEXAMETHASONE 6 MG: 4 TABLET ORAL at 09:59

## 2020-11-11 RX ADMIN — TRAZODONE HYDROCHLORIDE 50 MG: 50 TABLET ORAL at 21:29

## 2020-11-11 RX ADMIN — SUCRALFATE 1 G: 1 TABLET ORAL at 21:28

## 2020-11-11 RX ADMIN — OXYCODONE HYDROCHLORIDE AND ACETAMINOPHEN 500 MG: 500 TABLET ORAL at 09:59

## 2020-11-11 RX ADMIN — SODIUM CHLORIDE 30 ML: 9 INJECTION, SOLUTION INTRAVENOUS at 19:21

## 2020-11-11 RX ADMIN — FLUTICASONE PROPIONATE 1 SPRAY: 50 SPRAY, METERED NASAL at 10:13

## 2020-11-11 RX ADMIN — REMDESIVIR 100 MG: 100 INJECTION, POWDER, LYOPHILIZED, FOR SOLUTION INTRAVENOUS at 18:24

## 2020-11-11 RX ADMIN — SUCRALFATE 1 G: 1 TABLET ORAL at 18:24

## 2020-11-11 RX ADMIN — ACETAMINOPHEN 650 MG: 325 TABLET ORAL at 16:44

## 2020-11-11 RX ADMIN — Medication 6 MG: at 09:59

## 2020-11-11 RX ADMIN — ENOXAPARIN SODIUM 30 MG: 30 INJECTION SUBCUTANEOUS at 21:28

## 2020-11-11 RX ADMIN — CARVEDILOL 6.25 MG: 6.25 TABLET, FILM COATED ORAL at 18:24

## 2020-11-11 RX ADMIN — MONTELUKAST SODIUM 10 MG: 10 TABLET, FILM COATED ORAL at 21:27

## 2020-11-11 RX ADMIN — BUDESONIDE AND FORMOTEROL FUMARATE DIHYDRATE 2 PUFF: 80; 4.5 AEROSOL RESPIRATORY (INHALATION) at 19:22

## 2020-11-11 RX ADMIN — ATORVASTATIN CALCIUM 20 MG: 20 TABLET, FILM COATED ORAL at 21:27

## 2020-11-11 RX ADMIN — GUAIFENESIN AND DEXTROMETHORPHAN 5 ML: 100; 10 SYRUP ORAL at 09:58

## 2020-11-11 RX ADMIN — ENOXAPARIN SODIUM 30 MG: 30 INJECTION SUBCUTANEOUS at 09:58

## 2020-11-11 RX ADMIN — ACETAMINOPHEN 650 MG: 325 TABLET ORAL at 09:59

## 2020-11-11 RX ADMIN — Medication 50 MG: at 09:59

## 2020-11-11 RX ADMIN — POTASSIUM CHLORIDE 20 MEQ: 1500 TABLET, EXTENDED RELEASE ORAL at 19:20

## 2020-11-11 RX ADMIN — SODIUM CHLORIDE, PRESERVATIVE FREE 10 ML: 5 INJECTION INTRAVENOUS at 18:24

## 2020-11-11 RX ADMIN — GUAIFENESIN AND DEXTROMETHORPHAN 5 ML: 100; 10 SYRUP ORAL at 21:28

## 2020-11-11 RX ADMIN — PANTOPRAZOLE SODIUM 40 MG: 40 TABLET, DELAYED RELEASE ORAL at 06:52

## 2020-11-11 RX ADMIN — BUDESONIDE AND FORMOTEROL FUMARATE DIHYDRATE 2 PUFF: 80; 4.5 AEROSOL RESPIRATORY (INHALATION) at 10:12

## 2020-11-11 RX ADMIN — OXYCODONE HYDROCHLORIDE AND ACETAMINOPHEN 500 MG: 500 TABLET ORAL at 21:28

## 2020-11-11 RX ADMIN — CARVEDILOL 6.25 MG: 6.25 TABLET, FILM COATED ORAL at 09:59

## 2020-11-11 ASSESSMENT — PAIN DESCRIPTION - DESCRIPTORS
DESCRIPTORS: ACHING;CONSTANT;PRESSURE;STABBING
DESCRIPTORS: DISCOMFORT
DESCRIPTORS: ACHING;CONSTANT;PRESSURE;STABBING

## 2020-11-11 ASSESSMENT — PAIN DESCRIPTION - PAIN TYPE
TYPE: CHRONIC PAIN

## 2020-11-11 ASSESSMENT — PAIN DESCRIPTION - FREQUENCY
FREQUENCY: CONTINUOUS
FREQUENCY: CONTINUOUS

## 2020-11-11 ASSESSMENT — PAIN SCALES - GENERAL
PAINLEVEL_OUTOF10: 0
PAINLEVEL_OUTOF10: 2
PAINLEVEL_OUTOF10: 2
PAINLEVEL_OUTOF10: 3
PAINLEVEL_OUTOF10: 2
PAINLEVEL_OUTOF10: 2
PAINLEVEL_OUTOF10: 3

## 2020-11-11 ASSESSMENT — PAIN DESCRIPTION - LOCATION
LOCATION: BACK

## 2020-11-11 ASSESSMENT — PAIN DESCRIPTION - ORIENTATION
ORIENTATION: LOWER

## 2020-11-11 ASSESSMENT — PAIN DESCRIPTION - ONSET
ONSET: ON-GOING

## 2020-11-11 ASSESSMENT — PAIN DESCRIPTION - PROGRESSION: CLINICAL_PROGRESSION: GRADUALLY IMPROVING

## 2020-11-11 NOTE — PROGRESS NOTES
Hospitalist Progress Note      SYNOPSIS: Patient admitted on 2020 for Pneumonia due to COVID-19 virus      SUBJECTIVE:    Patient seen and examined  Records reviewed.      Comorbid conditions  CHF  CMP    Receiving remdesivir   She is ambulating in the room  She is eating and drinking    Temp (24hrs), Av °F (36.1 °C), Min:96.1 °F (35.6 °C), Max:97.6 °F (36.4 °C)    DIET: DIET CARDIAC;  CODE: Prior    Intake/Output Summary (Last 24 hours) at 2020 1734  Last data filed at 2020 1646  Gross per 24 hour   Intake  ml   Output 1650 ml   Net 370 ml       OBJECTIVE:    /80 Comment: manual  Pulse 62   Temp 97.2 °F (36.2 °C) (Oral)   Resp 20   Ht 5' 6\" (1.676 m)   Wt 199 lb (90.3 kg)   SpO2 95%   BMI 32.12 kg/m²     General appearance: does not appear jaundiced     .   Respiratory: no active wheeze    On room air    respirat pattern appears comfortable  Cardiovascular: audible S1-S2  Neurologic: awake, alert and following commands     ASSESSMENT:       COVID 19 Pneumonia  CMP chrnc   CHF chrnc- appears volume optimized  abnl ekg-  Borderline low potassium     PLAN:       remdesivir  Vit c  Contin to hold lasix  Decrease if fluids  lovenox sq  Will dose potassium 20meqkcl once  Check potass and magnesium in am    DISPOSITION: expected home    Medications:  REVIEWED DAILY    Infusion Medications   Scheduled Medications    carvedilol  6.25 mg Oral BID WC    valsartan  80 mg Oral Daily    montelukast  10 mg Oral Nightly    oxybutynin  15 mg Oral Daily    traZODone  50 mg Oral Nightly    sodium chloride flush  10 mL Intravenous BID    zinc sulfate  50 mg Oral Daily    vitamin C  500 mg Oral BID    dexamethasone  6 mg Oral Daily    atorvastatin  20 mg Oral Nightly    fluticasone  1 spray Nasal Daily    budesonide-formoterol  2 puff Inhalation BID    pantoprazole  40 mg Oral QAM AC    enoxaparin  30 mg Subcutaneous BID    melatonin  6 mg Oral Daily    remdesivir IVPB  100 mg Intravenous Q24H     PRN Meds: gabapentin, sucralfate, ondansetron, hydrOXYzine, sodium chloride flush, acetaminophen **OR** acetaminophen, albuterol sulfate HFA, guaiFENesin-dextromethorphan, sodium chloride, iopamidol    Labs:     Recent Labs     11/09/20  1020 11/10/20  0420 11/11/20  0350   WBC 4.9 7.7 6.4   HGB 12.6 12.1 11.6   HCT 37.2 36.0 35.9    250 255       Recent Labs     11/09/20  1020 11/10/20  0420 11/11/20  0350    138 137   K 3.2* 3.9 3.6    103 103   CO2 28 26 25   BUN 11 15 16   CREATININE 0.6 0.6 0.6   CALCIUM 9.0 8.8 8.7       Recent Labs     11/09/20  1020 11/10/20  0420 11/11/20  0350   PROT 7.4 6.6 6.3*   ALKPHOS 103 103 97   ALT 52* 53* 52*   AST 34* 31 31   BILITOT 0.4 0.2 0.2       Recent Labs     11/08/20  1922   INR 1.1       Recent Labs     11/09/20  1020 11/10/20  0420 11/10/20  1330   TROPONINI <0.01 <0.01 <0.01       Chronic labs:    Lab Results   Component Value Date    CHOL 246 (H) 03/27/2014    TRIG 114 03/27/2014    HDL 60 03/27/2014    LDLCALC 163 (H) 03/27/2014    TSH 1.610 03/26/2014    INR 1.1 11/08/2020       Radiology: REVIEWED DAILY    +++++++++++++++++++++++++++++++++++++++++++++++++  [address], [place]  +++++++++++++++++++++++++++++++++++++++++++++++++  NOTE: This report was transcribed using voice recognition software. Every effort was made to ensure accuracy; however, inadvertent computerized transcription errors may be present.

## 2020-11-11 NOTE — PROGRESS NOTES
Dr. Vester Dakin notified that the pt takes 6.25 or coreg BID with meals and diovan 80 mg daily. Orders obtained and placed at this time. Dr. Vester Dakin notified that pt home medications will be reviewed and updated in [facility].

## 2020-11-11 NOTE — CARE COORDINATION
SOCIAL WORK/DISCHARGE PLANNING;  Care coordination update. Pt on day three of remdesivir. She is on 2L 02 sats 92%. Will continue to follow for discharge planning. Pt as per chart notes if home 02 is needed, she is ok with [facility] or [facility].    Monse Lester LSW  [phone]

## 2020-11-11 NOTE — PROGRESS NOTES
sounds to auscultation. Benign to palpation. No masses felt. No hepatosplenomegaly. Genitourinary: female  Extremities: No clubbing, no cyanosis, no edema.   Lines: peripheral    Laboratory and Tests Review:  Lab Results   Component Value Date    WBC 6.4 11/11/2020    WBC 7.7 11/10/2020    WBC 4.9 11/09/2020    HGB 11.6 11/11/2020    HCT 35.9 11/11/2020    MCV 88.9 11/11/2020     11/11/2020     Lab Results   Component Value Date    NEUTROABS 4.72 11/11/2020    NEUTROABS 5.61 11/10/2020    NEUTROABS 3.44 11/09/2020     No results found for: CRPHS  Lab Results   Component Value Date    ALT 52 (H) 11/11/2020    AST 31 11/11/2020    ALKPHOS 97 11/11/2020    BILITOT 0.2 11/11/2020     Lab Results   Component Value Date     11/11/2020    K 3.6 11/11/2020     11/11/2020    CO2 25 11/11/2020    BUN 16 11/11/2020    CREATININE 0.6 11/11/2020    CREATININE 0.6 11/10/2020    CREATININE 0.6 11/09/2020    GFRAA >60 11/11/2020    LABGLOM >60 11/11/2020    GLUCOSE 176 11/11/2020    PROT 6.3 11/11/2020    LABALBU 3.3 11/11/2020    CALCIUM 8.7 11/11/2020    BILITOT 0.2 11/11/2020    ALKPHOS 97 11/11/2020    AST 31 11/11/2020    ALT 52 11/11/2020     Lab Results   Component Value Date    CRP 13.7 (H) 11/09/2020     No results found for: [address]  Radiology:  Reviewed    Microbiology:   Lab Results   Component Value Date    BC 24 Hours no growth 11/08/2020     Lab Results   Component Value Date    BLOODCULT2 24 Hours no growth 11/08/2020    BLOODCULT2 5 Days- no growth 03/26/2014    BLOODCULT2 5 Days- no growth 03/26/2014     No results found for: WNDABS  No results found for: RESPSMEAR  No results found for: MPNEUMO, CLAMYDCU, LABLEGI, AFBCX, FUNGSM, LABFUNG  No results found for: CULTRESP  No results found for: CXCATHTIP  No results found for: BFCS  No results found for: CXSURG  Urine Culture, Routine   Date Value Ref Range Status   11/08/2020   Final    <10,000 CFU/mL  Mixed gram positive organisms  Mixed gram positive cocci       No results found for: [facility]      Assessment:  · Covid pneumonia severe    Plan:    ·  remdesivir day 3, vitamin C minerals and melatonin  · DC Levaquin  · Check cultures  · Monitor labs  · Will follow with you    Thank you for having us see this patient in consultation  Edmundo Davis  1:38 PM  11/11/2020

## 2020-11-11 NOTE — PROGRESS NOTES
Spoke with Eliz Moe pharmacist at [facility] and reviewed pt home medications. Per Eliz Moe the pt has not filled albuterol inhaler since feb 2020 and Breo inhaler in over 6 months. Per Noé the pt has not filled lipitor since 11/30/2019. This RN spoke with the pt who states she rarely uses her albuterol inhaler and hasnt needed to get a refil since it was last filled. Pt also states that her [facility] was too expensive and her daughter use to take this medication so she uses her old inhalers since her daughter is no longer taking medication. Pt states that she rarely take her lipitor d/t a ruptured calf muscle that gets sore periodically so she stops taking lipitor when she does. Per pt she has a supply lipitor at home and he PCP is aware that she has been taking her medications like this.

## 2020-11-12 LAB
ANION GAP SERPL CALCULATED.3IONS-SCNC: 10 MMOL/L (ref 7–16)
BASOPHILS ABSOLUTE: 0.02 E9/L (ref 0–0.2)
BASOPHILS RELATIVE PERCENT: 0.3 % (ref 0–2)
BUN BLDV-MCNC: 15 MG/DL (ref 6–20)
CALCIUM SERPL-MCNC: 8.7 MG/DL (ref 8.6–10.2)
CHLORIDE BLD-SCNC: 103 MMOL/L (ref 98–107)
CO2: 25 MMOL/L (ref 22–29)
CREAT SERPL-MCNC: 0.5 MG/DL (ref 0.5–1)
EOSINOPHILS ABSOLUTE: 0.01 E9/L (ref 0.05–0.5)
EOSINOPHILS RELATIVE PERCENT: 0.1 % (ref 0–6)
GFR AFRICAN AMERICAN: >60
GFR NON-AFRICAN AMERICAN: >60 ML/MIN/1.73
GLUCOSE BLD-MCNC: 134 MG/DL (ref 74–99)
HCT VFR BLD CALC: 35.6 % (ref 34–48)
HEMOGLOBIN: 11.9 G/DL (ref 11.5–15.5)
IMMATURE GRANULOCYTES #: 0.18 E9/L
IMMATURE GRANULOCYTES %: 2.3 % (ref 0–5)
LYMPHOCYTES ABSOLUTE: 1.46 E9/L (ref 1.5–4)
LYMPHOCYTES RELATIVE PERCENT: 18.5 % (ref 20–42)
MAGNESIUM: 2.1 MG/DL (ref 1.6–2.6)
MCH RBC QN AUTO: 29.3 PG (ref 26–35)
MCHC RBC AUTO-ENTMCNC: 33.4 % (ref 32–34.5)
MCV RBC AUTO: 87.7 FL (ref 80–99.9)
MONOCYTES ABSOLUTE: 0.6 E9/L (ref 0.1–0.95)
MONOCYTES RELATIVE PERCENT: 7.6 % (ref 2–12)
NEUTROPHILS ABSOLUTE: 5.64 E9/L (ref 1.8–7.3)
NEUTROPHILS RELATIVE PERCENT: 71.2 % (ref 43–80)
PDW BLD-RTO: 12.7 FL (ref 11.5–15)
PLATELET # BLD: 292 E9/L (ref 130–450)
PMV BLD AUTO: 9.8 FL (ref 7–12)
POTASSIUM SERPL-SCNC: 3.7 MMOL/L (ref 3.5–5)
RBC # BLD: 4.06 E12/L (ref 3.5–5.5)
SODIUM BLD-SCNC: 138 MMOL/L (ref 132–146)
WBC # BLD: 7.9 E9/L (ref 4.5–11.5)

## 2020-11-12 PROCEDURE — 6360000002 HC RX W HCPCS: Performed by: FAMILY MEDICINE

## 2020-11-12 PROCEDURE — 2700000000 HC OXYGEN THERAPY PER DAY

## 2020-11-12 PROCEDURE — 2580000003 HC RX 258: Performed by: SPECIALIST

## 2020-11-12 PROCEDURE — 83735 ASSAY OF MAGNESIUM: CPT

## 2020-11-12 PROCEDURE — 80048 BASIC METABOLIC PNL TOTAL CA: CPT

## 2020-11-12 PROCEDURE — 85025 COMPLETE CBC W/AUTO DIFF WBC: CPT

## 2020-11-12 PROCEDURE — 2140000000 HC CCU INTERMEDIATE R&B

## 2020-11-12 PROCEDURE — 6370000000 HC RX 637 (ALT 250 FOR IP): Performed by: INTERNAL MEDICINE

## 2020-11-12 PROCEDURE — 6370000000 HC RX 637 (ALT 250 FOR IP): Performed by: SPECIALIST

## 2020-11-12 PROCEDURE — 6370000000 HC RX 637 (ALT 250 FOR IP): Performed by: FAMILY MEDICINE

## 2020-11-12 PROCEDURE — 2500000003 HC RX 250 WO HCPCS: Performed by: SPECIALIST

## 2020-11-12 PROCEDURE — 2580000003 HC RX 258: Performed by: INTERNAL MEDICINE

## 2020-11-12 RX ADMIN — BUDESONIDE AND FORMOTEROL FUMARATE DIHYDRATE 2 PUFF: 80; 4.5 AEROSOL RESPIRATORY (INHALATION) at 19:37

## 2020-11-12 RX ADMIN — OXYCODONE HYDROCHLORIDE AND ACETAMINOPHEN 500 MG: 500 TABLET ORAL at 19:38

## 2020-11-12 RX ADMIN — HYDROXYZINE PAMOATE 50 MG: 50 CAPSULE ORAL at 18:19

## 2020-11-12 RX ADMIN — SODIUM CHLORIDE, PRESERVATIVE FREE 10 ML: 5 INJECTION INTRAVENOUS at 19:38

## 2020-11-12 RX ADMIN — REMDESIVIR 100 MG: 100 INJECTION, POWDER, LYOPHILIZED, FOR SOLUTION INTRAVENOUS at 17:36

## 2020-11-12 RX ADMIN — ENOXAPARIN SODIUM 30 MG: 30 INJECTION SUBCUTANEOUS at 09:25

## 2020-11-12 RX ADMIN — OXYCODONE HYDROCHLORIDE AND ACETAMINOPHEN 500 MG: 500 TABLET ORAL at 09:26

## 2020-11-12 RX ADMIN — POTASSIUM CHLORIDE 20 MEQ: 1500 TABLET, EXTENDED RELEASE ORAL at 09:26

## 2020-11-12 RX ADMIN — OXYBUTYNIN CHLORIDE 15 MG: 5 TABLET, EXTENDED RELEASE ORAL at 09:26

## 2020-11-12 RX ADMIN — CARVEDILOL 6.25 MG: 6.25 TABLET, FILM COATED ORAL at 09:26

## 2020-11-12 RX ADMIN — PANTOPRAZOLE SODIUM 40 MG: 40 TABLET, DELAYED RELEASE ORAL at 05:58

## 2020-11-12 RX ADMIN — MONTELUKAST SODIUM 10 MG: 10 TABLET, FILM COATED ORAL at 19:38

## 2020-11-12 RX ADMIN — TRAZODONE HYDROCHLORIDE 50 MG: 50 TABLET ORAL at 19:37

## 2020-11-12 RX ADMIN — Medication 50 MG: at 09:25

## 2020-11-12 RX ADMIN — DEXAMETHASONE 6 MG: 4 TABLET ORAL at 09:25

## 2020-11-12 RX ADMIN — SODIUM CHLORIDE, PRESERVATIVE FREE 10 ML: 5 INJECTION INTRAVENOUS at 17:36

## 2020-11-12 RX ADMIN — SODIUM CHLORIDE, PRESERVATIVE FREE 10 ML: 5 INJECTION INTRAVENOUS at 09:25

## 2020-11-12 RX ADMIN — POTASSIUM CHLORIDE 20 MEQ: 1500 TABLET, EXTENDED RELEASE ORAL at 17:36

## 2020-11-12 RX ADMIN — CARVEDILOL 6.25 MG: 6.25 TABLET, FILM COATED ORAL at 17:36

## 2020-11-12 RX ADMIN — VALSARTAN 80 MG: 160 TABLET, FILM COATED ORAL at 09:26

## 2020-11-12 RX ADMIN — ATORVASTATIN CALCIUM 20 MG: 20 TABLET, FILM COATED ORAL at 19:38

## 2020-11-12 RX ADMIN — ENOXAPARIN SODIUM 30 MG: 30 INJECTION SUBCUTANEOUS at 19:37

## 2020-11-12 RX ADMIN — FLUTICASONE PROPIONATE 1 SPRAY: 50 SPRAY, METERED NASAL at 09:32

## 2020-11-12 RX ADMIN — ACETAMINOPHEN 650 MG: 325 TABLET ORAL at 19:46

## 2020-11-12 RX ADMIN — Medication 6 MG: at 09:26

## 2020-11-12 RX ADMIN — BUDESONIDE AND FORMOTEROL FUMARATE DIHYDRATE 2 PUFF: 80; 4.5 AEROSOL RESPIRATORY (INHALATION) at 09:33

## 2020-11-12 ASSESSMENT — PAIN DESCRIPTION - PAIN TYPE
TYPE: CHRONIC PAIN

## 2020-11-12 ASSESSMENT — PAIN DESCRIPTION - FREQUENCY
FREQUENCY: CONTINUOUS
FREQUENCY: CONTINUOUS

## 2020-11-12 ASSESSMENT — PAIN DESCRIPTION - PROGRESSION: CLINICAL_PROGRESSION: GRADUALLY IMPROVING

## 2020-11-12 ASSESSMENT — PAIN DESCRIPTION - ORIENTATION
ORIENTATION: LOWER
ORIENTATION: LOWER

## 2020-11-12 ASSESSMENT — PAIN SCALES - GENERAL
PAINLEVEL_OUTOF10: 3
PAINLEVEL_OUTOF10: 0
PAINLEVEL_OUTOF10: 0
PAINLEVEL_OUTOF10: 2
PAINLEVEL_OUTOF10: 3

## 2020-11-12 ASSESSMENT — PAIN DESCRIPTION - DESCRIPTORS
DESCRIPTORS: ACHING;DISCOMFORT
DESCRIPTORS: ACHING;DISCOMFORT

## 2020-11-12 ASSESSMENT — PAIN DESCRIPTION - LOCATION
LOCATION: BACK

## 2020-11-12 ASSESSMENT — PAIN DESCRIPTION - ONSET: ONSET: ON-GOING

## 2020-11-12 NOTE — PLAN OF CARE
Problem: Falls - Risk of:  Goal: Will remain free from falls  11/12/2020 0543 by Stephenie Torres RN  Outcome: Met This Shift  11/11/2020 2236 by Stephenie Torres RN  Outcome: Met This Shift  Goal: Absence of physical injury  Outcome: Met This Shift     Problem: Pain:  Goal: Pain level will decrease  11/12/2020 0543 by Stephenie Torres RN  Outcome: Met This Shift  11/11/2020 2236 by Stephenie Torres RN  Outcome: Met This Shift  Goal: Control of chronic pain  Outcome: Met This Shift     Problem: Airway Clearance - Ineffective  Goal: Achieve or maintain patent airway  Outcome: Met This Shift     Problem: Gas Exchange - Impaired  Goal: Absence of hypoxia  11/12/2020 0543 by Stephenie Torres RN  Outcome: Met This Shift  11/11/2020 2236 by Stephenie Torres RN  Outcome: Met This Shift     Problem: Breathing Pattern - Ineffective  Goal: Ability to achieve and maintain a regular respiratory rate  11/12/2020 0543 by Stephenie Torres RN  Outcome: Met This Shift  11/11/2020 2236 by Stephenie Torres RN  Outcome: Met This Shift     Problem:  Body Temperature -  Risk of, Imbalanced  Goal: Ability to maintain a body temperature within defined limits  11/12/2020 0543 by Stephenie Torres RN  Outcome: Met This Shift  11/11/2020 2236 by Stephenie Torres RN  Outcome: Met This Shift  Goal: Complications related to the disease process, condition or treatment will be avoided or minimized  11/12/2020 0543 by Stephenie Torres RN  Outcome: Met This Shift  11/11/2020 2236 by Stephenie Torres RN  Outcome: Met This Shift

## 2020-11-12 NOTE — PROGRESS NOTES
Physician Progress Note      PATIENT:               Lyndsey Noel  CSN #:                  [id number]  :                       1965  ADMIT DATE:       2020 7:29 PM  [address] [place] DATE:  RESPONDING  PROVIDER #:        Everett Asencio DO          QUERY TEXT:    Patient admitted with COVID-19. Documentation reflects Sepsis in H&P, not   carried through into next note. Due to dropped documentation, if possible,   please document in the progress notes and discharge summary if Sepsis was: The medical record reflects the following:  Risk Factors: COVID-19  Clinical Indicators: Per H&P, Pneumonia secondary to COVID-19, \"sepsis   secondary to above\". WBC 4.9-7.8, lactic acid 1.2, procalcitonin 0.05, CRP   13.7. Temps 96.1-100.3. 88% on room air. Treatment: labs, vitals, diagnostics    Thank you,  Sveta Horvath, RN, BSN, Clinical Documentation Improvement  Options provided:  -- Sepsis ruled out after study  -- Sepsis treated and resolved  -- Sepsis confirmed after study  -- Other - I will add my own diagnosis  -- Disagree - Not applicable / Not valid  -- Disagree - Clinically unable to determine / Unknown  -- Refer to Clinical Documentation Reviewer    PROVIDER RESPONSE TEXT:    Sepsis ruled out after study.     Query created by: Loly Fan on 2020 10:01 AM      Electronically signed by:  Everett Asencio DO 2020 11:09 AM

## 2020-11-12 NOTE — PROGRESS NOTES
Hospitalist Progress Note      SYNOPSIS: Patient admitted on 2020 for Pneumonia due to COVID-19 virus female who presented to [facility] with past medical history of chronic systolic CHF, EF 98% in 4215, hypertension, hyperlipidemia and chronic gastritis. Patient started feeling sick 2 days ago. She had tested positive for Covid 5 days ago and has been monitoring saturation at home. For the past 2 days, she has been having shortness of breath on and off, moderate in intensity, associated with cough and hypoxemia. Saturation in the 80s. She reports nausea but no vomiting, dizziness with near syncope while ambulating, no chest pain. Vital signs notable for pulse rate 116, respiratory rate 25, blood pressure 150/100. Labs showed AST 41, ALT 63, phosphatase 116, proBNP 206, negative urinalysis and troponin. EKG shows sinus rhythm rate of 98. Chest x-ray is negative. CTA of the chest shows bilateral groundglass opacities. No PE.      SUBJECTIVE:  Stable overnight. No other overnight issues reported. Patient seen and examined  Records reviewed. 2L NC  On remdesivir  Admits to SOB with exertion    Temp (24hrs), Av.9 °F (36.1 °C), Min:96.5 °F (35.8 °C), Max:97.2 °F (36.2 °C)    DIET: DIET CARDIAC;  CODE: Prior    Intake/Output Summary (Last 24 hours) at 2020 1105  Last data filed at 2020 0602  Gross per 24 hour   Intake 1403 ml   Output 2050 ml   Net -647 ml       Review of Systems  All bolded are positive; please see HPI  General:  Fever, chills, diaphoresis, fatigue, malaise, night sweats, weight loss  Psychological:  Anxiety, disorientation, hallucinations. ENT:  Epistaxis, headaches, vertigo, visual changes. Cardiovascular:  Chest pain, irregular heartbeats, palpitations, paroxysmal nocturnal dyspnea. Respiratory:  Shortness of breath, coughing, sputum production, hemoptysis, wheezing, orthopnea.   Gastrointestinal:  Nausea, vomiting, diarrhea, heartburn, constipation, abdominal pain, hematemesis, hematochezia, melena, acholic stools  Genito-Urinary:  Dysuria, urgency, frequency, hematuria  Musculoskeletal:  Joint pain, joint stiffness, joint swelling, muscle pain  Neurology:  Headache, focal neurological deficits, weakness, numbness, paresthesia  Derm:  Rashes, ulcers, excoriations, bruising  Extremities:  Decreased ROM, peripheral edema, mottling      OBJECTIVE:    /72   Pulse 74   Temp 97 °F (36.1 °C) (Oral)   Resp 22   Ht 5' 6\" (1.676 m)   Wt 199 lb (90.3 kg)   SpO2 93%   BMI 32.12 kg/m²     General appearance:  awake, alert, and oriented to person, place, time, and purpose; appears stated age and cooperative; no apparent distress no labored breathing 2L  HEENT:  Conjunctivae/corneas clear. Neck: Supple. No jugular venous distention. Respiratory: symmetrical; clear to auscultation bilaterally; no wheezes; no rhonchi; no rales  Cardiovascular: rhythm regular; rate controlled; no murmurs  Abdomen: Soft, nontender, nondistended  Extremities:  peripheral pulses present; no peripheral edema; no ulcers  Musculoskeletal: No clubbing, cyanosis, no bilateral lower extremity edema. Brisk capillary refill. Skin:  No rashes  on visible skin  Neurologic: awake, alert and following commands     ASSESSMENT and PLAN:  · COVID-19 viral pneumonia- Infectious disease is following. On remdesivir. Breathing treatments. Decadron. Lovenox 30mg BID.    · Acute hypoxic respiratory failure due to above  · History of CHF  · Hypertension  · Hyperlipidemia             DISPOSITION: Continue current plan of care    Medications:  REVIEWED DAILY    Infusion Medications   Scheduled Medications    carvedilol  6.25 mg Oral BID WC    valsartan  80 mg Oral Daily    montelukast  10 mg Oral Nightly    oxybutynin  15 mg Oral Daily    potassium chloride  20 mEq Oral BID WC    traZODone  50 mg Oral Nightly    sodium chloride flush  10 mL Intravenous BID    zinc sulfate  50 mg Oral Daily    vitamin C  500 mg Oral BID    dexamethasone  6 mg Oral Daily    atorvastatin  20 mg Oral Nightly    fluticasone  1 spray Nasal Daily    budesonide-formoterol  2 puff Inhalation BID    pantoprazole  40 mg Oral QAM AC    enoxaparin  30 mg Subcutaneous BID    melatonin  6 mg Oral Daily    remdesivir IVPB  100 mg Intravenous Q24H     PRN Meds: gabapentin, sucralfate, ondansetron, hydrOXYzine, sodium chloride flush, acetaminophen **OR** acetaminophen, albuterol sulfate HFA, guaiFENesin-dextromethorphan, sodium chloride, iopamidol    Labs:     Recent Labs     11/10/20  0420 11/11/20  0350 11/12/20  0520   WBC 7.7 6.4 7.9   HGB 12.1 11.6 11.9   HCT 36.0 35.9 35.6    255 292       Recent Labs     11/10/20  0420 11/11/20  0350 11/12/20  0520    137 138   K 3.9 3.6 3.7    103 103   CO2 26 25 25   BUN 15 16 15   CREATININE 0.6 0.6 0.5   CALCIUM 8.8 8.7 8.7       Recent Labs     11/10/20  0420 11/11/20  0350   PROT 6.6 6.3*   ALKPHOS 103 97   ALT 53* 52*   AST 31 31   BILITOT 0.2 0.2       No results for input(s): INR in the last 72 hours. Recent Labs     11/10/20  0420 11/10/20  1330   TROPONINI <0.01 <0.01       Chronic labs:    Lab Results   Component Value Date    CHOL 246 (H) 03/27/2014    TRIG 114 03/27/2014    HDL 60 03/27/2014    LDLCALC 163 (H) 03/27/2014    TSH 1.610 03/26/2014    INR 1.1 11/08/2020       Radiology: REVIEWED DAILY    +++++++++++++++++++++++++++++++++++++++++++++++++  [facility] Physician - [address] [facility], [place]  +++++++++++++++++++++++++++++++++++++++++++++++++  NOTE: This report was transcribed using voice recognition software. Every effort was made to ensure accuracy; however, inadvertent computerized transcription errors may be present.

## 2020-11-12 NOTE — PROGRESS NOTES
[address] [place] [address] [facility]  Progress Note      Chief Complaint   Patient presents with    Concern For COVID-19     pt is covid + , c/o dizziness / weakness/ SOB when walking        SUBJECTIVE:  Patient is tolerating medications. No reported adverse drug reactions. No nausea, vomiting, diarrhea. \  Has nausea. No sob like she had at home however she is not moving around much. .    2 L at 93 to 94% on room air. Has cough with white mucus. Afebrile  Review of systems:  As stated above in the chief complaint, otherwise negative. Medications:  Scheduled Meds:   carvedilol  6.25 mg Oral BID WC    valsartan  80 mg Oral Daily    montelukast  10 mg Oral Nightly    oxybutynin  15 mg Oral Daily    potassium chloride  20 mEq Oral BID WC    traZODone  50 mg Oral Nightly    sodium chloride flush  10 mL Intravenous BID    zinc sulfate  50 mg Oral Daily    vitamin C  500 mg Oral BID    dexamethasone  6 mg Oral Daily    atorvastatin  20 mg Oral Nightly    fluticasone  1 spray Nasal Daily    budesonide-formoterol  2 puff Inhalation BID    pantoprazole  40 mg Oral QAM AC    enoxaparin  30 mg Subcutaneous BID    melatonin  6 mg Oral Daily    remdesivir IVPB  100 mg Intravenous Q24H     Continuous Infusions:  PRN Meds:gabapentin, sucralfate, ondansetron, hydrOXYzine, sodium chloride flush, acetaminophen **OR** acetaminophen, albuterol sulfate HFA, guaiFENesin-dextromethorphan, sodium chloride, iopamidol    OBJECTIVE:  /82   Pulse 64   Temp 96.4 °F (35.8 °C) (Oral)   Resp 24   Ht 5' 6\" (1.676 m)   Wt 199 lb (90.3 kg)   SpO2 91%   BMI 32.12 kg/m²   Temp  Av.8 °F (36 °C)  Min: 96.4 °F (35.8 °C)  Max: 97.1 °F (36.2 °C)  Constitutional: The patient is awake, alert, and oriented. Skin: Warm and dry. No rashes were noted. HEENT:  Moist mucous membranes. No ulcerations or thrush. Neck: Supple to movements. Chest: No use of accessory muscles to breathe.  Symmetrical expansion. No wheezing, crackles or rhonchi. Cardiovascular: S1 and S2 are rhythmic and regular. No murmurs appreciated. Abdomen: Positive bowel sounds to auscultation. Benign to palpation. No masses felt. No hepatosplenomegaly. Genitourinary: female  Extremities: No clubbing, no cyanosis, no edema.   Lines: peripheral    Laboratory and Tests Review:  Lab Results   Component Value Date    WBC 7.9 11/12/2020    WBC 6.4 11/11/2020    WBC 7.7 11/10/2020    HGB 11.9 11/12/2020    HCT 35.6 11/12/2020    MCV 87.7 11/12/2020     11/12/2020     Lab Results   Component Value Date    NEUTROABS 5.64 11/12/2020    NEUTROABS 4.72 11/11/2020    NEUTROABS 5.61 11/10/2020     No results found for: CRPHS  Lab Results   Component Value Date    ALT 52 (H) 11/11/2020    AST 31 11/11/2020    ALKPHOS 97 11/11/2020    BILITOT 0.2 11/11/2020     Lab Results   Component Value Date     11/12/2020    K 3.7 11/12/2020    K 3.6 11/11/2020     11/12/2020    CO2 25 11/12/2020    BUN 15 11/12/2020    CREATININE 0.5 11/12/2020    CREATININE 0.6 11/11/2020    CREATININE 0.6 11/10/2020    GFRAA >60 11/12/2020    LABGLOM >60 11/12/2020    GLUCOSE 134 11/12/2020    PROT 6.3 11/11/2020    LABALBU 3.3 11/11/2020    CALCIUM 8.7 11/12/2020    BILITOT 0.2 11/11/2020    ALKPHOS 97 11/11/2020    AST 31 11/11/2020    ALT 52 11/11/2020     Lab Results   Component Value Date    CRP 13.7 (H) 11/09/2020     No results found for: Leva Grave  Radiology:  Reviewed    Microbiology:   Lab Results   Component Value Date    BC 24 Hours no growth 11/08/2020     Lab Results   Component Value Date    BLOODCULT2 24 Hours no growth 11/08/2020    BLOODCULT2 5 Days- no growth 03/26/2014    BLOODCULT2 5 Days- no growth 03/26/2014     No results found for: WNDABS  No results found for: RESPSMEAR  No results found for: MPNEUMO, CLAMYDCU, LABLEGI, AFBCX, FUNGSM, LABFUNG  No results found for: CULTRESP  No results found for: CXCATHTIP  No results found for: BFCS  No results found for: CXSURG  Urine Culture, Routine   Date Value Ref Range Status   11/08/2020   Final    <10,000 CFU/mL  Mixed gram positive organisms  Mixed gram positive cocci       No results found for: [facility]      Assessment:  · Covid pneumonia severe    Plan:    ·  remdesivir day 4, vitamin C minerals and melatonin  · DC Levaquin  · Check cultures  · Monitor labs  · Will follow with you    Thank you for having us see this patient in consultation  Jessica Noland  5:56 PM  11/12/2020

## 2020-11-12 NOTE — PLAN OF CARE
Problem: Falls - Risk of:  Goal: Will remain free from falls  11/11/2020 2236 by Anabelle Bright RN  Outcome: Met This Shift  11/11/2020 1011 by Jhonathan Dunham RN  Outcome: Met This Shift  Goal: Absence of physical injury  11/11/2020 2236 by Anabelle Bright RN  Outcome: Met This Shift  11/11/2020 1011 by Jhonathan Dunham RN  Outcome: Met This Shift     Problem: Pain:  Goal: Pain level will decrease  11/11/2020 2236 by Anabelle Bright RN  Outcome: Met This Shift  11/11/2020 1011 by Jhonathan Dunham RN  Outcome: Met This Shift  Goal: Control of acute pain  11/11/2020 1011 by Jhonathan Dunham RN  Outcome: Met This Shift  Goal: Control of chronic pain  11/11/2020 2236 by Anabelle Bright RN  Outcome: Met This Shift  11/11/2020 1011 by Jhonathan Dunham RN  Outcome: Met This Shift     Problem: Airway Clearance - Ineffective  Goal: Achieve or maintain patent airway  11/11/2020 2236 by Anabelle Bright RN  Outcome: Met This Shift  11/11/2020 1011 by Jhonathan Dunham RN  Outcome: Met This Shift     Problem: Pain:  Goal: Pain level will decrease  11/11/2020 2236 by Anabelle Bright RN  Outcome: Met This Shift  11/11/2020 1011 by Jhonathan Dunham RN  Outcome: Met This Shift  Goal: Control of acute pain  11/11/2020 1011 by Jhonathan Dunham RN  Outcome: Met This Shift  Goal: Control of chronic pain  11/11/2020 2236 by Anabelle Bright RN  Outcome: Met This Shift  11/11/2020 1011 by Jhonathan Dunham RN  Outcome: Met This Shift     Problem: Airway Clearance - Ineffective  Goal: Achieve or maintain patent airway  11/11/2020 2236 by Anabelle Bright RN  Outcome: Met This Shift  11/11/2020 1011 by Jhonathan Dunham RN  Outcome: Met This Shift     Problem: Fatigue  Goal: Verbalize increase energy and improved vitality  11/11/2020 1011 by Jhonathan Dunham RN  Outcome: Met This Shift     Problem: Loneliness or Risk for Loneliness  Goal: Demonstrate positive use of time alone when socialization is not possible  11/11/2020 1011 by Jhonathan Dunham, RN  Outcome:  Met This Shift

## 2020-11-13 ENCOUNTER — APPOINTMENT (OUTPATIENT)
Dept: GENERAL RADIOLOGY | Age: 55
DRG: 177 | End: 2020-11-13
Payer: COMMERCIAL

## 2020-11-13 LAB
BASOPHILS ABSOLUTE: 0.02 E9/L (ref 0–0.2)
BASOPHILS RELATIVE PERCENT: 0.2 % (ref 0–2)
BLOOD CULTURE, ROUTINE: NORMAL
CULTURE, BLOOD 2: NORMAL
EOSINOPHILS ABSOLUTE: 0.01 E9/L (ref 0.05–0.5)
EOSINOPHILS RELATIVE PERCENT: 0.1 % (ref 0–6)
HCT VFR BLD CALC: 38.9 % (ref 34–48)
HEMOGLOBIN: 12.6 G/DL (ref 11.5–15.5)
IMMATURE GRANULOCYTES #: 0.44 E9/L
IMMATURE GRANULOCYTES %: 4.5 % (ref 0–5)
LYMPHOCYTES ABSOLUTE: 1.69 E9/L (ref 1.5–4)
LYMPHOCYTES RELATIVE PERCENT: 17.3 % (ref 20–42)
MCH RBC QN AUTO: 28.6 PG (ref 26–35)
MCHC RBC AUTO-ENTMCNC: 32.4 % (ref 32–34.5)
MCV RBC AUTO: 88.4 FL (ref 80–99.9)
MONOCYTES ABSOLUTE: 0.58 E9/L (ref 0.1–0.95)
MONOCYTES RELATIVE PERCENT: 5.9 % (ref 2–12)
NEUTROPHILS ABSOLUTE: 7.02 E9/L (ref 1.8–7.3)
NEUTROPHILS RELATIVE PERCENT: 72 % (ref 43–80)
PDW BLD-RTO: 12.6 FL (ref 11.5–15)
PLATELET # BLD: 326 E9/L (ref 130–450)
PMV BLD AUTO: 9.7 FL (ref 7–12)
RBC # BLD: 4.4 E12/L (ref 3.5–5.5)
WBC # BLD: 9.8 E9/L (ref 4.5–11.5)

## 2020-11-13 PROCEDURE — 2700000000 HC OXYGEN THERAPY PER DAY

## 2020-11-13 PROCEDURE — 85025 COMPLETE CBC W/AUTO DIFF WBC: CPT

## 2020-11-13 PROCEDURE — 2500000003 HC RX 250 WO HCPCS: Performed by: SPECIALIST

## 2020-11-13 PROCEDURE — 6370000000 HC RX 637 (ALT 250 FOR IP): Performed by: FAMILY MEDICINE

## 2020-11-13 PROCEDURE — 6370000000 HC RX 637 (ALT 250 FOR IP): Performed by: SPECIALIST

## 2020-11-13 PROCEDURE — 6370000000 HC RX 637 (ALT 250 FOR IP): Performed by: INTERNAL MEDICINE

## 2020-11-13 PROCEDURE — 6360000002 HC RX W HCPCS: Performed by: FAMILY MEDICINE

## 2020-11-13 PROCEDURE — 2140000000 HC CCU INTERMEDIATE R&B

## 2020-11-13 PROCEDURE — 2580000003 HC RX 258: Performed by: INTERNAL MEDICINE

## 2020-11-13 PROCEDURE — 71045 X-RAY EXAM CHEST 1 VIEW: CPT

## 2020-11-13 PROCEDURE — 2580000003 HC RX 258: Performed by: SPECIALIST

## 2020-11-13 RX ORDER — ASCORBIC ACID 500 MG
500 TABLET ORAL 2 TIMES DAILY
Qty: 20 TABLET | Refills: 0 | Status: SHIPPED | OUTPATIENT
Start: 2020-11-13 | End: 2020-11-14

## 2020-11-13 RX ORDER — DEXAMETHASONE 6 MG/1
6 TABLET ORAL DAILY
Qty: 5 TABLET | Refills: 0 | Status: SHIPPED | OUTPATIENT
Start: 2020-11-14 | End: 2020-11-14

## 2020-11-13 RX ORDER — CARVEDILOL 3.12 MG/1
6.25 TABLET ORAL 2 TIMES DAILY WITH MEALS
Qty: 120 TABLET | Refills: 0 | Status: SHIPPED
Start: 2020-11-13

## 2020-11-13 RX ORDER — ZINC SULFATE 50(220)MG
50 CAPSULE ORAL DAILY
Qty: 10 CAPSULE | Refills: 0 | COMMUNITY
Start: 2020-11-14 | End: 2020-11-14

## 2020-11-13 RX ORDER — GUAIFENESIN/DEXTROMETHORPHAN 100-10MG/5
5 SYRUP ORAL EVERY 4 HOURS PRN
Qty: 120 ML | Refills: 0 | Status: SHIPPED | OUTPATIENT
Start: 2020-11-13 | End: 2020-11-14

## 2020-11-13 RX ADMIN — ENOXAPARIN SODIUM 30 MG: 30 INJECTION SUBCUTANEOUS at 21:17

## 2020-11-13 RX ADMIN — REMDESIVIR 100 MG: 100 INJECTION, POWDER, LYOPHILIZED, FOR SOLUTION INTRAVENOUS at 18:06

## 2020-11-13 RX ADMIN — OXYCODONE HYDROCHLORIDE AND ACETAMINOPHEN 500 MG: 500 TABLET ORAL at 08:53

## 2020-11-13 RX ADMIN — OXYBUTYNIN CHLORIDE 15 MG: 5 TABLET, EXTENDED RELEASE ORAL at 08:53

## 2020-11-13 RX ADMIN — FLUTICASONE PROPIONATE 1 SPRAY: 50 SPRAY, METERED NASAL at 08:51

## 2020-11-13 RX ADMIN — POTASSIUM CHLORIDE 20 MEQ: 1500 TABLET, EXTENDED RELEASE ORAL at 08:53

## 2020-11-13 RX ADMIN — CARVEDILOL 6.25 MG: 6.25 TABLET, FILM COATED ORAL at 16:54

## 2020-11-13 RX ADMIN — SODIUM CHLORIDE, PRESERVATIVE FREE 10 ML: 5 INJECTION INTRAVENOUS at 21:17

## 2020-11-13 RX ADMIN — Medication 6 MG: at 08:52

## 2020-11-13 RX ADMIN — ENOXAPARIN SODIUM 30 MG: 30 INJECTION SUBCUTANEOUS at 08:51

## 2020-11-13 RX ADMIN — GUAIFENESIN AND DEXTROMETHORPHAN 5 ML: 100; 10 SYRUP ORAL at 08:53

## 2020-11-13 RX ADMIN — BUDESONIDE AND FORMOTEROL FUMARATE DIHYDRATE 2 PUFF: 80; 4.5 AEROSOL RESPIRATORY (INHALATION) at 20:01

## 2020-11-13 RX ADMIN — DEXAMETHASONE 6 MG: 4 TABLET ORAL at 08:53

## 2020-11-13 RX ADMIN — Medication 50 MG: at 08:52

## 2020-11-13 RX ADMIN — SODIUM CHLORIDE, PRESERVATIVE FREE 10 ML: 5 INJECTION INTRAVENOUS at 08:50

## 2020-11-13 RX ADMIN — MONTELUKAST SODIUM 10 MG: 10 TABLET, FILM COATED ORAL at 21:16

## 2020-11-13 RX ADMIN — PANTOPRAZOLE SODIUM 40 MG: 40 TABLET, DELAYED RELEASE ORAL at 07:03

## 2020-11-13 RX ADMIN — ATORVASTATIN CALCIUM 20 MG: 20 TABLET, FILM COATED ORAL at 21:17

## 2020-11-13 RX ADMIN — TRAZODONE HYDROCHLORIDE 50 MG: 50 TABLET ORAL at 21:16

## 2020-11-13 RX ADMIN — GABAPENTIN 300 MG: 300 CAPSULE ORAL at 21:22

## 2020-11-13 RX ADMIN — OXYCODONE HYDROCHLORIDE AND ACETAMINOPHEN 500 MG: 500 TABLET ORAL at 21:16

## 2020-11-13 RX ADMIN — VALSARTAN 80 MG: 160 TABLET, FILM COATED ORAL at 08:52

## 2020-11-13 RX ADMIN — CARVEDILOL 6.25 MG: 6.25 TABLET, FILM COATED ORAL at 08:52

## 2020-11-13 RX ADMIN — BUDESONIDE AND FORMOTEROL FUMARATE DIHYDRATE 2 PUFF: 80; 4.5 AEROSOL RESPIRATORY (INHALATION) at 09:02

## 2020-11-13 ASSESSMENT — PAIN SCALES - GENERAL
PAINLEVEL_OUTOF10: 0
PAINLEVEL_OUTOF10: 6
PAINLEVEL_OUTOF10: 0
PAINLEVEL_OUTOF10: 0
PAINLEVEL_OUTOF10: 2
PAINLEVEL_OUTOF10: 0
PAINLEVEL_OUTOF10: 0

## 2020-11-13 ASSESSMENT — PAIN DESCRIPTION - PAIN TYPE: TYPE: CHRONIC PAIN

## 2020-11-13 ASSESSMENT — PAIN DESCRIPTION - ORIENTATION: ORIENTATION: LOWER

## 2020-11-13 ASSESSMENT — PAIN DESCRIPTION - LOCATION: LOCATION: BACK

## 2020-11-13 NOTE — DISCHARGE SUMMARY
Discharge Summary    Admit date: 11/8/2020    Discharge date and time: No discharge date for patient encounter. Admitting Physician: Maxwell Molina DO     Consultants: Infectious disease    Admission Diagnoses:  COVID-19    Discharge Diagnoses and hospital course:   · COVID-19 viral pneumonia- Infectious disease followed. 5th dose of remdesivir today. Breathing treatments. Will be sent home with home O2. 14 day quarantine. · Acute hypoxic respiratory failure due to above  · History of CHF  · Hypertension  · Hyperlipidemia    Oxygen sat, 2l NC, at rest 95%  Oxygen sat, RA, at rest 89%  Oxygen sat, RA, with ambulation, 87%  Oxygen sat, 2l NC, Recovery, 93%    Discharge Exam:  General appearance:  awake, alert, and oriented to person, place, time, and purpose; appears stated age and cooperative; no apparent distress no labored breathing 2L  HEENT:  Conjunctivae/corneas clear. Neck: Supple. No jugular venous distention. Respiratory: symmetrical; clear to auscultation bilaterally; no wheezes; no rhonchi; no rales  Cardiovascular: rhythm regular; rate controlled; no murmurs  Abdomen: Soft, nontender, nondistended  Extremities:  peripheral pulses present; no peripheral edema; no ulcers  Musculoskeletal: No clubbing, cyanosis, no bilateral lower extremity edema. Brisk capillary refill. Skin:  No rashes  on visible skin  Neurologic: awake, alert and following commands    Disposition: home  The patient's condition is fair. At this time the patient is without objective evidence of an acute process requiring continuing hospitalization or inpatient management. They are stable for discharge with outpatient follow-up. I have spoken with the patient and discussed the results of the current hospitalization, in addition to providing specific details for the plan of care and counseling regarding the diagnosis and prognosis.   The plan has been discussed in detail and they are aware of the specific conditions for emergent return, as well as the importance of follow-up. Their questions are answered at this time and they are agreeable with the plan for discharge to home     Patient Instructions: Follow up with PCP within 7 days. Return to ER if symptoms worsen. Discharge Medications:     Medication List      START taking these medications    ascorbic acid 500 MG tablet  Commonly known as:  VITAMIN C  Take 1 tablet by mouth 2 times daily     dexamethasone 6 MG tablet  Commonly known as:  DECADRON  Take 1 tablet by mouth daily for 5 days     guaiFENesin-dextromethorphan 100-10 MG/5ML syrup  Commonly known as:  ROBITUSSIN DM  Take 5 mLs by mouth every 4 hours as needed for Cough     zinc sulfate 220 (50 Zn) MG capsule  Commonly known as:  ZINCATE  Take 1 capsule by mouth daily        CHANGE how you take these medications    furosemide 20 MG tablet  Commonly known as:  LASIX  Take 1 tablet by mouth daily. What changed:    · how much to take  · when to take this  · reasons to take this        CONTINUE taking these medications    albuterol sulfate  (90 Base) MCG/ACT inhaler  Commonly known as:  Proventil HFA  Inhale 2 puffs into the lungs every 6 hours as needed for Wheezing or Shortness of Breath     atorvastatin 20 MG tablet  Commonly known as:  LIPITOR  Take 1 tablet by mouth nightly.      Breo Ellipta 100-25 MCG/INH Aepb inhaler  Generic drug:  fluticasone-vilanterol     carvedilol 3.125 MG tablet  Commonly known as:  COREG  Take 2 tablets by mouth 2 times daily (with meals)     fluticasone 50 MCG/ACT nasal spray  Commonly known as:  FLONASE     gabapentin 300 MG capsule  Commonly known as:  NEURONTIN     methylphenidate 10 MG tablet  Commonly known as:  RITALIN     montelukast 10 MG tablet  Commonly known as:  SINGULAIR     oxybutynin 15 MG extended release tablet  Commonly known as:  DITROPAN XL     pantoprazole 40 MG tablet  Commonly known as:  PROTONIX     potassium chloride 20 MEQ extended release tablet  Commonly known as:  KLOR-CON M     sucralfate 1 GM tablet  Commonly known as:  CARAFATE     valsartan 160 MG tablet  Commonly known as:  DIOVAN           Where to Get Your Medications      These medications were sent to [facility],  Donny Lopez [phone]  [address] [facility], [address] [facility]    Phone:  [phone]   · albuterol sulfate  (90 Base) MCG/ACT inhaler     These medications were sent to [facility], [address] [address]    Phone:  [phone]   · ascorbic acid 500 MG tablet  · dexamethasone 6 MG tablet  · guaiFENesin-dextromethorphan 100-10 MG/5ML syrup     You can get these medications from any pharmacy    You don't need a prescription for these medications  · zinc sulfate 220 (50 Zn) MG capsule     Information about where to get these medications is not yet available    Ask your nurse or doctor about these medications  · carvedilol 3.125 MG tablet         Activity: activity as tolerated    Diet: regular diet    Wound Care: none needed    Follow-up:    · This patient is instructed to follow-up with her primary care physician. · Patient is instructed to follow-up with the consults listed above as directed by them. · They are instructed to resume home medications and take new medications as indicated in the list above. · If the patient has a recurrence of symptoms, they are instructed to go to the ED. Preparing for this patient's discharge, including paperwork, orders, instructions, and meeting with patient did require > 30 minutes.     Forest Hoffmann DO   10:32 AM  11/14/2020

## 2020-11-13 NOTE — PROGRESS NOTES
rhonchi. Diminished throughout   Cardiovascular: S1 and S2 are rhythmic and regular. No murmurs appreciated. Abdomen: Positive bowel sounds to auscultation. Benign to palpation. No masses felt. No hepatosplenomegaly. Genitourinary: female  Extremities: No clubbing, no cyanosis, no edema.   Lines: peripheral    Laboratory and Tests Review:  Lab Results   Component Value Date    WBC 9.8 11/13/2020    WBC 7.9 11/12/2020    WBC 6.4 11/11/2020    HGB 12.6 11/13/2020    HCT 38.9 11/13/2020    MCV 88.4 11/13/2020     11/13/2020     Lab Results   Component Value Date    NEUTROABS 7.02 11/13/2020    NEUTROABS 5.64 11/12/2020    NEUTROABS 4.72 11/11/2020     No results found for: CRPHS  Lab Results   Component Value Date    ALT 52 (H) 11/11/2020    AST 31 11/11/2020    ALKPHOS 97 11/11/2020    BILITOT 0.2 11/11/2020     Lab Results   Component Value Date     11/12/2020    K 3.7 11/12/2020    K 3.6 11/11/2020     11/12/2020    CO2 25 11/12/2020    BUN 15 11/12/2020    CREATININE 0.5 11/12/2020    CREATININE 0.6 11/11/2020    CREATININE 0.6 11/10/2020    GFRAA >60 11/12/2020    LABGLOM >60 11/12/2020    GLUCOSE 134 11/12/2020    PROT 6.3 11/11/2020    LABALBU 3.3 11/11/2020    CALCIUM 8.7 11/12/2020    BILITOT 0.2 11/11/2020    ALKPHOS 97 11/11/2020    AST 31 11/11/2020    ALT 52 11/11/2020     Lab Results   Component Value Date    CRP 13.7 (H) 11/09/2020     No results found for: [address]  Radiology:  Reviewed    Microbiology:   Lab Results   Component Value Date    BC 24 Hours no growth 11/08/2020     Lab Results   Component Value Date    BLOODCULT2 24 Hours no growth 11/08/2020    BLOODCULT2 5 Days- no growth 03/26/2014    BLOODCULT2 5 Days- no growth 03/26/2014     No results found for: WNDABS  No results found for: RESPSMEAR  No results found for: MPNEUMO, CLAMYDCU, LABLEGI, AFBCX, FUNGSM, LABFUNG  No results found for: CULTRESP  No results found for: CXCATHTIP  No results found for: BFCS  No results found for: CXSURG  Urine Culture, Routine   Date Value Ref Range Status   11/08/2020   Final    <10,000 CFU/mL  Mixed gram positive organisms  Mixed gram positive cocci       No results found for: [facility]      Assessment:  · Covid pneumonia severe    Plan:    · remdesivir day 5, vitamin C minerals and melatonin- OK TO D/C FROM ID POV  · Can dc after today's dose of rem   · DC Levaquin  · Check cultures  · Monitor labs  · Will follow with you    Thank you for having us see this patient in consultation  Naomie Serrano  3:39 PM  11/13/2020       Pt seen and examined. Above discussed agree with advanced practice nurse. Labs, cultures, and radiographs reviewed. Face to Face encounter occurred. Changes made as necessary.      Glenroy Canada MD

## 2020-11-13 NOTE — PROGRESS NOTES
Hospitalist Progress Note      SYNOPSIS: Patient admitted on 2020 for Pneumonia due to COVID-19 virus female who presented to [facility] with past medical history of chronic systolic CHF, EF 35% in 5357, hypertension, hyperlipidemia and chronic gastritis. Patient started feeling sick 2 days ago. She had tested positive for Covid 5 days ago and has been monitoring saturation at home. For the past 2 days, she has been having shortness of breath on and off, moderate in intensity, associated with cough and hypoxemia. Saturation in the 80s. She reports nausea but no vomiting, dizziness with near syncope while ambulating, no chest pain. Vital signs notable for pulse rate 116, respiratory rate 25, blood pressure 150/100. Labs showed AST 41, ALT 63, phosphatase 116, proBNP 206, negative urinalysis and troponin. EKG shows sinus rhythm rate of 98. Chest x-ray is negative. CTA of the chest shows bilateral groundglass opacities. No PE.      SUBJECTIVE:  Stable overnight. No other overnight issues reported. Patient seen and examined  Records reviewed. 2L NC  On remdesivir, tonight  Day   Admits to SOB with exertion  She states her breathing is feeling worse today and that she feels worse today  She states she now also has sore throat and green nasal drainage  She states she gets sinus infections frequently    Temp (24hrs), Av.9 °F (36.1 °C), Min:96.4 °F (35.8 °C), Max:97.8 °F (36.6 °C)    DIET: DIET CARDIAC;  CODE: Prior    Intake/Output Summary (Last 24 hours) at 2020 1256  Last data filed at 2020 0630  Gross per 24 hour   Intake 240 ml   Output 1100 ml   Net -860 ml       Review of Systems  All bolded are positive; please see HPI  General:  Fever, chills, diaphoresis, fatigue, malaise, night sweats, weight loss  Psychological:  Anxiety, disorientation, hallucinations. ENT:  Epistaxis, headaches, vertigo, visual changes.   Cardiovascular:  Chest pain, irregular heartbeats, palpitations, paroxysmal nocturnal dyspnea. Respiratory:  Shortness of breath, coughing, sputum production, hemoptysis, wheezing, orthopnea. Gastrointestinal:  Nausea, vomiting, diarrhea, heartburn, constipation, abdominal pain, hematemesis, hematochezia, melena, acholic stools  Genito-Urinary:  Dysuria, urgency, frequency, hematuria  Musculoskeletal:  Joint pain, joint stiffness, joint swelling, muscle pain  Neurology:  Headache, focal neurological deficits, weakness, numbness, paresthesia  Derm:  Rashes, ulcers, excoriations, bruising  Extremities:  Decreased ROM, peripheral edema, mottling      OBJECTIVE:    /74   Pulse 80   Temp 96.6 °F (35.9 °C)   Resp 19   Ht 5' 6\" (1.676 m)   Wt 199 lb 8 oz (90.5 kg)   SpO2 95%   BMI 32.20 kg/m²     General appearance:  awake, alert, and oriented to person, place, time, and purpose; appears stated age and cooperative; no apparent distress no labored breathing 2L  HEENT:  Conjunctivae/corneas clear. Neck: Supple. No jugular venous distention. Respiratory: symmetrical; scattered rhonchi; no wheezes; no rhonchi; no rales  Cardiovascular: rhythm regular; rate controlled; no murmurs  Abdomen: Soft, nontender, nondistended  Extremities:  peripheral pulses present; no peripheral edema; no ulcers  Musculoskeletal: No clubbing, cyanosis, no bilateral lower extremity edema. Brisk capillary refill. Skin:  No rashes  on visible skin  Neurologic: awake, alert and following commands     ASSESSMENT and PLAN:  · COVID-19 viral pneumonia- Infectious disease is following. On remdesivir tonight day 5/5. Breathing treatments. Decadron. Lovenox 30mg BID. Patient states she is feeling worse today, check CXR. · Acute hypoxic respiratory failure due to above- Ordered home oxygen for discharge.   · History of CHF  · Hypertension  · Hyperlipidemia             DISPOSITION: Continue current plan of care, possible dc tonight or tomorrow pending clinical course    Medications:  REVIEWED DAILY    Infusion Medications   Scheduled Medications    carvedilol  6.25 mg Oral BID WC    valsartan  80 mg Oral Daily    montelukast  10 mg Oral Nightly    oxybutynin  15 mg Oral Daily    traZODone  50 mg Oral Nightly    sodium chloride flush  10 mL Intravenous BID    zinc sulfate  50 mg Oral Daily    vitamin C  500 mg Oral BID    dexamethasone  6 mg Oral Daily    atorvastatin  20 mg Oral Nightly    fluticasone  1 spray Nasal Daily    budesonide-formoterol  2 puff Inhalation BID    pantoprazole  40 mg Oral QAM AC    enoxaparin  30 mg Subcutaneous BID    melatonin  6 mg Oral Daily    remdesivir IVPB  100 mg Intravenous Q24H     PRN Meds: gabapentin, sucralfate, ondansetron, hydrOXYzine, sodium chloride flush, acetaminophen **OR** acetaminophen, albuterol sulfate HFA, guaiFENesin-dextromethorphan, sodium chloride, iopamidol    Labs:     Recent Labs     11/11/20  0350 11/12/20  0520 11/13/20  0421   WBC 6.4 7.9 9.8   HGB 11.6 11.9 12.6   HCT 35.9 35.6 38.9    292 326       Recent Labs     11/11/20  0350 11/12/20  0520    138   K 3.6 3.7    103   CO2 25 25   BUN 16 15   CREATININE 0.6 0.5   CALCIUM 8.7 8.7       Recent Labs     11/11/20  0350   PROT 6.3*   ALKPHOS 97   ALT 52*   AST 31   BILITOT 0.2       No results for input(s): INR in the last 72 hours. Recent Labs     11/10/20  1330   TROPONINI <0.01       Chronic labs:    Lab Results   Component Value Date    CHOL 246 (H) 03/27/2014    TRIG 114 03/27/2014    HDL 60 03/27/2014    LDLCALC 163 (H) 03/27/2014    TSH 1.610 03/26/2014    INR 1.1 11/08/2020       Radiology: REVIEWED DAILY    +++++++++++++++++++++++++++++++++++++++++++++++++  Charis Ruiz Physician - [address], [place]  +++++++++++++++++++++++++++++++++++++++++++++++++  NOTE: This report was transcribed using voice recognition software.  Every effort was made to ensure accuracy; however, inadvertent computerized transcription errors may be present.

## 2020-11-13 NOTE — PLAN OF CARE
Problem: Falls - Risk of:  Goal: Will remain free from falls  Description: Will remain free from falls  Outcome: Met This Shift  Goal: Absence of physical injury  Description: Absence of physical injury  Outcome: Met This Shift     Problem: Pain:  Goal: Pain level will decrease  Description: Pain level will decrease  Outcome: Met This Shift  Goal: Control of chronic pain  Description: Control of chronic pain  Outcome: Met This Shift     Problem: Airway Clearance - Ineffective  Goal: Achieve or maintain patent airway  Outcome: Met This Shift     Problem: Gas Exchange - Impaired  Goal: Absence of hypoxia  Outcome: Met This Shift  Goal: Promote optimal lung function  Outcome: Met This Shift     Problem: Breathing Pattern - Ineffective  Goal: Ability to achieve and maintain a regular respiratory rate  Outcome: Met This Shift     Problem:  Body Temperature -  Risk of, Imbalanced  Goal: Ability to maintain a body temperature within defined limits  Outcome: Met This Shift  Goal: Will regain or maintain usual level of consciousness  Outcome: Met This Shift  Goal: Complications related to the disease process, condition or treatment will be avoided or minimized  Outcome: Met This Shift     Problem: Isolation Precautions - Risk of Spread of Infection  Goal: Prevent transmission of infection  Outcome: Met This Shift     Problem: Risk for Fluid Volume Deficit  Goal: Maintain normal heart rhythm  Outcome: Met This Shift  Goal: Maintain absence of muscle cramping  Outcome: Met This Shift  Goal: Maintain normal serum potassium, sodium, calcium, phosphorus, and pH  Outcome: Met This Shift     Problem: Fatigue  Goal: Verbalize increase energy and improved vitality  Outcome: Met This Shift     Problem: Patient Education: Go to Patient Education Activity  Goal: Patient/Family Education  Outcome: Met This Shift

## 2020-11-13 NOTE — CARE COORDINATION
Care Coordination:  Referral made to Heather Ferguson at [facility] for home 02, she will submit for approval but will not deliver until pt dc. Please call [phone] when dc is planned for delivery. Per Dr Sadi Vigil, okay to dc after tonight's dose of Remdesivir if okay with attending.     Tabby Contreras

## 2020-11-13 NOTE — PROGRESS NOTES
Oxygen sat, 2l NC, at rest 95%  Oxygen sat, RA, at rest 89%  Oxygen sat, RA, with ambulation, 87%  Oxygen sat, 2l NC, Recovery, 93%

## 2020-11-14 VITALS
OXYGEN SATURATION: 95 % | SYSTOLIC BLOOD PRESSURE: 108 MMHG | RESPIRATION RATE: 18 BRPM | DIASTOLIC BLOOD PRESSURE: 68 MMHG | TEMPERATURE: 96.9 F | HEART RATE: 78 BPM | WEIGHT: 198.5 LBS | BODY MASS INDEX: 31.9 KG/M2 | HEIGHT: 66 IN

## 2020-11-14 LAB
BASOPHILS ABSOLUTE: 0 E9/L (ref 0–0.2)
BASOPHILS RELATIVE PERCENT: 0.3 % (ref 0–2)
EOSINOPHILS ABSOLUTE: 0 E9/L (ref 0.05–0.5)
EOSINOPHILS RELATIVE PERCENT: 0 % (ref 0–6)
HCT VFR BLD CALC: 39.4 % (ref 34–48)
HEMOGLOBIN: 13 G/DL (ref 11.5–15.5)
LYMPHOCYTES ABSOLUTE: 1.5 E9/L (ref 1.5–4)
LYMPHOCYTES RELATIVE PERCENT: 11.3 % (ref 20–42)
MCH RBC QN AUTO: 29 PG (ref 26–35)
MCHC RBC AUTO-ENTMCNC: 33 % (ref 32–34.5)
MCV RBC AUTO: 87.9 FL (ref 80–99.9)
MONOCYTES ABSOLUTE: 0.82 E9/L (ref 0.1–0.95)
MONOCYTES RELATIVE PERCENT: 6.1 % (ref 2–12)
MYELOCYTE PERCENT: 0.9 % (ref 0–0)
NEUTROPHILS ABSOLUTE: 11.29 E9/L (ref 1.8–7.3)
NEUTROPHILS RELATIVE PERCENT: 81.7 % (ref 43–80)
PDW BLD-RTO: 12.5 FL (ref 11.5–15)
PLATELET # BLD: 357 E9/L (ref 130–450)
PMV BLD AUTO: 9.6 FL (ref 7–12)
RBC # BLD: 4.48 E12/L (ref 3.5–5.5)
RBC # BLD: NORMAL 10*6/UL
WBC # BLD: 13.6 E9/L (ref 4.5–11.5)

## 2020-11-14 PROCEDURE — 85025 COMPLETE CBC W/AUTO DIFF WBC: CPT

## 2020-11-14 PROCEDURE — 6370000000 HC RX 637 (ALT 250 FOR IP): Performed by: FAMILY MEDICINE

## 2020-11-14 PROCEDURE — 2700000000 HC OXYGEN THERAPY PER DAY

## 2020-11-14 PROCEDURE — 6370000000 HC RX 637 (ALT 250 FOR IP): Performed by: SPECIALIST

## 2020-11-14 PROCEDURE — 6370000000 HC RX 637 (ALT 250 FOR IP): Performed by: INTERNAL MEDICINE

## 2020-11-14 PROCEDURE — 2580000003 HC RX 258: Performed by: INTERNAL MEDICINE

## 2020-11-14 PROCEDURE — 6360000002 HC RX W HCPCS: Performed by: FAMILY MEDICINE

## 2020-11-14 RX ORDER — DEXAMETHASONE 6 MG/1
6 TABLET ORAL DAILY
Qty: 5 TABLET | Refills: 0 | Status: SHIPPED | OUTPATIENT
Start: 2020-11-14 | End: 2020-11-19

## 2020-11-14 RX ORDER — ASCORBIC ACID 500 MG
500 TABLET ORAL 2 TIMES DAILY
Qty: 20 TABLET | Refills: 0 | Status: SHIPPED | OUTPATIENT
Start: 2020-11-14

## 2020-11-14 RX ORDER — ZINC SULFATE 50(220)MG
50 CAPSULE ORAL DAILY
Qty: 10 CAPSULE | Refills: 0 | COMMUNITY
Start: 2020-11-14

## 2020-11-14 RX ORDER — ALBUTEROL SULFATE 90 UG/1
2 AEROSOL, METERED RESPIRATORY (INHALATION) EVERY 6 HOURS PRN
Qty: 1 INHALER | Refills: 0 | Status: SHIPPED | OUTPATIENT
Start: 2020-11-14

## 2020-11-14 RX ORDER — GUAIFENESIN/DEXTROMETHORPHAN 100-10MG/5
5 SYRUP ORAL EVERY 4 HOURS PRN
Qty: 120 ML | Refills: 0 | Status: SHIPPED | OUTPATIENT
Start: 2020-11-14 | End: 2020-11-24

## 2020-11-14 RX ADMIN — OXYCODONE HYDROCHLORIDE AND ACETAMINOPHEN 500 MG: 500 TABLET ORAL at 08:44

## 2020-11-14 RX ADMIN — FLUTICASONE PROPIONATE 1 SPRAY: 50 SPRAY, METERED NASAL at 08:47

## 2020-11-14 RX ADMIN — VALSARTAN 80 MG: 160 TABLET, FILM COATED ORAL at 08:44

## 2020-11-14 RX ADMIN — DEXAMETHASONE 6 MG: 4 TABLET ORAL at 08:44

## 2020-11-14 RX ADMIN — SODIUM CHLORIDE, PRESERVATIVE FREE 10 ML: 5 INJECTION INTRAVENOUS at 08:45

## 2020-11-14 RX ADMIN — OXYBUTYNIN CHLORIDE 15 MG: 5 TABLET, EXTENDED RELEASE ORAL at 08:44

## 2020-11-14 RX ADMIN — CARVEDILOL 6.25 MG: 6.25 TABLET, FILM COATED ORAL at 08:44

## 2020-11-14 RX ADMIN — Medication 50 MG: at 08:44

## 2020-11-14 RX ADMIN — Medication 6 MG: at 08:44

## 2020-11-14 RX ADMIN — BUDESONIDE AND FORMOTEROL FUMARATE DIHYDRATE 2 PUFF: 80; 4.5 AEROSOL RESPIRATORY (INHALATION) at 09:04

## 2020-11-14 RX ADMIN — PANTOPRAZOLE SODIUM 40 MG: 40 TABLET, DELAYED RELEASE ORAL at 06:26

## 2020-11-14 RX ADMIN — ENOXAPARIN SODIUM 30 MG: 30 INJECTION SUBCUTANEOUS at 08:45

## 2020-11-14 ASSESSMENT — PAIN SCALES - GENERAL: PAINLEVEL_OUTOF10: 0

## 2020-11-14 NOTE — PROGRESS NOTES
Jose Mackenzie contacted regarding oxygen delivery, oxygen sent to home.  Patient given contact number for Jose Mackenzie

## 2020-11-14 NOTE — CARE COORDINATION
Naomi notified of discharge. They delivered oxygen tank to the room yesterday, Naomi's number placed on follow up instructions for patient to call as soon as she gets home. For questions I can be reached at [phone].  Melanie Gordon, [employer]

## 2020-11-16 ENCOUNTER — CARE COORDINATION (OUTPATIENT)
Dept: CASE MANAGEMENT | Age: 55
End: 2020-11-16

## 2020-11-16 LAB
Lab: NORMAL
REPORT: NORMAL
THIS TEST SENT TO: NORMAL

## 2020-11-16 NOTE — CARE COORDINATION
appointments. Non-[facility] follow up appointment(s): telephone visit with PCP today    Non-face-to-face services provided:  Obtained and reviewed discharge summary and/or continuity of care documents     Advance Care Planning:   Does patient have an Advance Directive:  decision maker updated. Patient has following risk factors of: heart failure, pneumonia, sepsis, acute respiratory failure and HTN. CTN/ACM reviewed discharge instructions, medical action plan and red flags such as increased shortness of breath, increasing fever and signs of decompensation with patient who verbalized understanding. Discussed exposure protocols and quarantine with CDC Guidelines What to do if you are sick with coronavirus disease 2019.  Patient was given an opportunity for questions and concerns. The patient agrees to contact the [facility] exposure line [phone], local [facility] department [place] Department [facility] Health: ([phone]) and PCP office for questions related to their healthcare. CTN/ACM provided contact information for future needs. Reviewed and educated patient on any new and changed medications related to discharge diagnosis. Patient declined full med review. CTN able to confirm patient has new medications of vitamin c, decadron, robitussin dm, and zinc as per AVS.  CTN stressed importance to complete full course of steroid therapy. Patient/family/caregiver given information for [employer] and agrees to enroll yes  Patient's preferred e-mail: [email or web address]   Patient's preferred phone number: [phone]  Based on Loop alert triggers, patient will be contacted by nurse care manager for worsening symptoms. Pt will be further monitored by COVID Loop Team based on severity of symptoms and risk factors. Follow Up  No future appointments.     Cecelia Rossi RN

## 2021-09-08 ENCOUNTER — HOSPITAL ENCOUNTER (EMERGENCY)
Age: 56
Discharge: HOME | End: 2021-09-08
Attending: EMERGENCY MEDICINE
Payer: COMMERCIAL

## 2021-09-08 VITALS
HEART RATE: 84 BPM | RESPIRATION RATE: 14 BRPM | BODY MASS INDEX: 31.5 KG/M2 | WEIGHT: 196 LBS | OXYGEN SATURATION: 94 % | DIASTOLIC BLOOD PRESSURE: 82 MMHG | SYSTOLIC BLOOD PRESSURE: 135 MMHG | TEMPERATURE: 98.2 F | HEIGHT: 66 IN

## 2021-09-08 DIAGNOSIS — L03.115 CELLULITIS OF RIGHT FOOT: Primary | ICD-10-CM

## 2021-09-08 PROCEDURE — 99284 EMERGENCY DEPT VISIT MOD MDM: CPT

## 2021-09-08 PROCEDURE — 6370000000 HC RX 637 (ALT 250 FOR IP): Performed by: EMERGENCY MEDICINE

## 2021-09-08 RX ORDER — NAPROXEN 500 MG/1
500 TABLET ORAL 2 TIMES DAILY
Qty: 14 TABLET | Refills: 0 | Status: SHIPPED | OUTPATIENT
Start: 2021-09-08 | End: 2021-09-15

## 2021-09-08 RX ORDER — HYDROCODONE BITARTRATE AND ACETAMINOPHEN 5; 325 MG/1; MG/1
1 TABLET ORAL EVERY 6 HOURS PRN
Qty: 5 TABLET | Refills: 0 | Status: SHIPPED | OUTPATIENT
Start: 2021-09-08 | End: 2021-09-10

## 2021-09-08 RX ORDER — NAPROXEN 250 MG/1
500 TABLET ORAL ONCE
Status: COMPLETED | OUTPATIENT
Start: 2021-09-08 | End: 2021-09-08

## 2021-09-08 RX ADMIN — NAPROXEN 500 MG: 250 TABLET ORAL at 16:00

## 2021-09-08 ASSESSMENT — ENCOUNTER SYMPTOMS
COUGH: 0
BACK PAIN: 0
ABDOMINAL DISTENTION: 0
DIARRHEA: 0
SHORTNESS OF BREATH: 0
SORE THROAT: 0
NAUSEA: 0
EYE DISCHARGE: 0
SINUS PRESSURE: 0
WHEEZING: 0
EYE PAIN: 0
EYE REDNESS: 0
VOMITING: 0

## 2021-09-08 ASSESSMENT — PAIN DESCRIPTION - PAIN TYPE: TYPE: ACUTE PAIN

## 2021-09-08 ASSESSMENT — PAIN SCALES - GENERAL
PAINLEVEL_OUTOF10: 6
PAINLEVEL_OUTOF10: 6

## 2021-09-08 ASSESSMENT — PAIN DESCRIPTION - FREQUENCY: FREQUENCY: CONTINUOUS

## 2021-09-08 ASSESSMENT — PAIN DESCRIPTION - PROGRESSION: CLINICAL_PROGRESSION: GRADUALLY WORSENING

## 2021-09-08 ASSESSMENT — PAIN DESCRIPTION - ONSET: ONSET: GRADUAL

## 2021-09-08 ASSESSMENT — PAIN DESCRIPTION - ORIENTATION: ORIENTATION: RIGHT

## 2021-09-08 ASSESSMENT — PAIN DESCRIPTION - DESCRIPTORS: DESCRIPTORS: ACHING;CONSTANT

## 2021-09-08 ASSESSMENT — PAIN DESCRIPTION - LOCATION: LOCATION: FOOT

## 2021-09-08 NOTE — ED NOTES
Discharge and follow up reviewed, Dr Orellana Mention at bedside to discuss discharge with pt.      Narda Nguyen RN  09/08/21 [id number]

## 2021-09-08 NOTE — ED PROVIDER NOTES
64 female presents to the emergency department with concern for a wound on her foot. Patient has multiple antibiotic resistances and history of anaphylaxis to multiple medications which is resulted in her being difficult to treat for infections. She states she started noticing a few days prior a wound on her right foot between her first and second toe. She was started on clindamycin is taken 5 doses and stated that redness has encroached beyond the small [place] that was placed around the redness prompting her to come in for evaluation. She states no fevers chills nausea vomiting diarrhea abdominal pain urinary symptoms or other complaints. She states no ankle tenderness but has had more difficulty walking today. She is taken no pain medicine for. She states no other complaints at this time    The history is provided by the patient. Foot Problem  Location:  Foot  Time since incident:  4 days  Injury: no    Foot location:  [place] of R foot  Pain details:     Quality:  Aching    Radiates to:  Does not radiate    Severity:  Mild    Onset quality:  Gradual    Duration:  4 days    Timing:  Intermittent    Progression:  Waxing and waning  Chronicity:  New  Relieved by:  Nothing  Worsened by:  Nothing  Ineffective treatments:  None tried  Associated symptoms: no back pain, no decreased ROM, no fever, no swelling and no tingling         Review of Systems   Constitutional: Negative for chills and fever. HENT: Negative for ear pain, sinus pressure and sore throat. Eyes: Negative for pain, discharge and redness. Respiratory: Negative for cough, shortness of breath and wheezing. Cardiovascular: Negative for chest pain. Gastrointestinal: Negative for abdominal distention, diarrhea, nausea and vomiting. Genitourinary: Negative for dysuria and frequency. Musculoskeletal: Negative for arthralgias and back pain. Skin: Positive for wound. Negative for rash.    Neurological: Negative for weakness and headaches. Hematological: Negative for adenopathy. All other systems reviewed and are negative. Physical Exam  Constitutional:       Appearance: Normal appearance. HENT:      Head: Normocephalic and atraumatic. Nose: Nose normal.   Eyes:      Extraocular Movements: Extraocular movements intact. Pupils: Pupils are equal, round, and reactive to light. Cardiovascular:      Rate and Rhythm: Normal rate and regular rhythm. Pulses: Normal pulses. Heart sounds: Normal heart sounds. Pulmonary:      Effort: Pulmonary effort is normal.      Breath sounds: Normal breath sounds. Abdominal:      General: Abdomen is flat. Bowel sounds are normal. There is no distension. Palpations: Abdomen is soft. Tenderness: There is no abdominal tenderness. There is no guarding. Musculoskeletal:         General: Normal range of motion. Skin:     General: Skin is warm. Capillary Refill: Capillary refill takes less than 2 seconds. Neurological:      General: No focal deficit present. Mental Status: She is alert and oriented to person, place, and time. Procedures     MDM  Number of Diagnoses or Management Options  Cellulitis of right foot  Diagnosis management comments: Patient seen and examined. Labs and imaging were not felt to be warranted. Patient does have slightly worsening redness encroaching outside the purple [place] drawn by her PCP. However she has full range of motion of the ankle and this is only slight and she is very early in her treatment for antibiotics. At this point I do not feel further interventions are warranted and would like to see further how the antibiotic works. She is discharged a short course of pain medicine anti-inflammatories which she has not been taking. It is felt the patient can be safely discharged at this time but she was given very strict return precautions for significantly worsening redness.   She states understanding is comfortable plan she is also encouraged to return if she develops fever or worsening systemic symptoms which she states understanding of was discharged with outpatient follow-up.             --------------------------------------------- PAST HISTORY ---------------------------------------------  Past Medical History:  has a past medical history of Acute systolic congestive heart failure, NYHA class 3 ([facility] [address] Chronic gastritis, Herniated disc, HTN (hypertension), and Hyperlipidemia. Past Surgical History:  has a past surgical history that includes Hysterectomy; back surgery; and Cystourethroscopy/Urethral Dilation (Bilateral, 2000). Social History:  reports that she has never smoked. She has never used smokeless tobacco. She reports current alcohol use. She reports that she does not use drugs. Family History: family history includes Cancer in her father; Diabetes in her mother; Heart Disease in her father; Other in her mother. The patients home medications have been reviewed. Allergies: Cefdinir, Minocycline, Pcn [penicillins], Sulfa antibiotics, and Codeine    -------------------------------------------------- RESULTS -------------------------------------------------  Labs:  No results found for this visit on 09/08/21. Radiology:  No orders to display       ------------------------- NURSING NOTES AND VITALS REVIEWED ---------------------------  Date / Time Roomed:  9/8/2021  3:08 PM  ED Bed Assignment:  33/33    The nursing notes within the ED encounter and vital signs as below have been reviewed.    /82   Pulse 84   Temp 98.2 °F (36.8 °C) (Oral)   Resp 14   Ht 5' 6\" (1.676 m)   Wt 196 lb (88.9 kg)   SpO2 94%   BMI 31.64 kg/m²   Oxygen Saturation Interpretation: Normal      ------------------------------------------ PROGRESS NOTES ------------------------------------------  I have spoken with the patient and discussed todays results, in addition to providing specific details for the plan of care and counseling regarding the diagnosis and prognosis. Their questions are answered at this time and they are agreeable with the plan. I discussed at length with them reasons for immediate return here for re evaluation. They will followup with their primary care physician by calling their office on Monday.      --------------------------------- ADDITIONAL PROVIDER NOTES ---------------------------------  At this time the patient is without objective evidence of an acute process requiring hospitalization or inpatient management. They have remained hemodynamically stable throughout their entire ED visit and are stable for discharge with outpatient follow-up. The plan has been discussed in detail and they are aware of the specific conditions for emergent return, as well as the importance of follow-up. Discharge Medication List as of 9/8/2021  4:01 PM      START taking these medications    Details   !! naproxen (NAPROSYN) 500 MG tablet Take 1 tablet by mouth 2 times daily for 7 days, Disp-14 tablet, R-0Print      HYDROcodone-acetaminophen (NORCO) 5-325 MG per tablet Take 1 tablet by mouth every 6 hours as needed for Pain for up to 5 doses. Intended supply: 3 days. Take lowest dose possible to manage pain, Disp-5 tablet, R-0Print      !! naproxen (NAPROSYN) 500 MG tablet Take 1 tablet by mouth 2 times daily for 7 days, Disp-14 tablet, R-0Print       !! - Potential duplicate medications found. Please discuss with provider. Diagnosis:  1. Cellulitis of right foot        Disposition:  Patient's disposition: Discharge to home  Patient's condition is stable.        Scarlet Ramos DO  09/08/21 [id number]

## 2024-04-15 ENCOUNTER — OFFICE VISIT (OUTPATIENT)
Dept: OBSTETRICS AND GYNECOLOGY | Facility: CLINIC | Age: 59
End: 2024-04-15
Payer: COMMERCIAL

## 2024-04-15 VITALS — DIASTOLIC BLOOD PRESSURE: 100 MMHG | WEIGHT: 181 LBS | SYSTOLIC BLOOD PRESSURE: 165 MMHG

## 2024-04-15 DIAGNOSIS — N81.4 UTEROVAGINAL PROLAPSE: Primary | ICD-10-CM

## 2024-04-15 DIAGNOSIS — M62.89 PELVIC FLOOR DYSFUNCTION: ICD-10-CM

## 2024-04-15 LAB
POC APPEARANCE, URINE: CLEAR
POC BILIRUBIN, URINE: NEGATIVE
POC BLOOD, URINE: NEGATIVE
POC COLOR, URINE: COLORLESS
POC GLUCOSE, URINE: ABNORMAL MG/DL
POC KETONES, URINE: NEGATIVE MG/DL
POC LEUKOCYTES, URINE: NEGATIVE
POC NITRITE,URINE: NEGATIVE
POC PH, URINE: 5.5 PH
POC PROTEIN, URINE: NEGATIVE MG/DL
POC SPECIFIC GRAVITY, URINE: 1.02
POC UROBILINOGEN, URINE: 0.2 EU/DL

## 2024-04-15 PROCEDURE — 99203 OFFICE O/P NEW LOW 30 MIN: CPT | Performed by: NURSE PRACTITIONER

## 2024-04-15 PROCEDURE — 99213 OFFICE O/P EST LOW 20 MIN: CPT | Performed by: NURSE PRACTITIONER

## 2024-04-15 PROCEDURE — 81003 URINALYSIS AUTO W/O SCOPE: CPT | Mod: QW | Performed by: NURSE PRACTITIONER

## 2024-04-15 RX ORDER — EMPAGLIFLOZIN 25 MG/1
1 TABLET, FILM COATED ORAL DAILY
COMMUNITY

## 2024-04-15 RX ORDER — ATORVASTATIN CALCIUM 40 MG/1
1 TABLET, FILM COATED ORAL DAILY
COMMUNITY

## 2024-04-15 RX ORDER — CARVEDILOL 6.25 MG/1
1 TABLET ORAL 2 TIMES DAILY
COMMUNITY

## 2024-04-15 ASSESSMENT — PAIN SCALES - GENERAL: PAINLEVEL: 0-NO PAIN

## 2024-04-15 NOTE — PROGRESS NOTES
General medical background:   - S/p laparoscopic supracervical hyst for fibroids, she has ovaries.    - EMB prior to hyst.   - ADHD, DM, back pain and herniated discs                 Obstetric/Gynecologic History:  Zenaida Morales has a history of : 1. Para: 1. Daughter was a preemie. No trauma with vaginal delivery. No history of  section(s). Weight of the baby was 1 lb 12 oz. Sexually active with partner of 10 years.   Her daughter is a patient's of Dr. Atwood     Past Evaluation and Treatment::  Past evaluation includes: This problem has not been previously evaluated  Past treatment includes: This problem has not been previously treated    Review of Systems     HPI    Testing results:  PVR results are available and reviewed  : 0 ml  UA results available and reviewed  Laboratory:   Urine dipstick shows negative.      History:  Vaginal Symptoms:   - Last Friday, she was showering and could feel something that felt like a tumor. She then went to lay down and grabbed a mirror, which is when she saw the prolapse.   - It feels like a tampon is stuck.   - She is afraid to push the bulge in and have intercourse.   - Denies bleeding.   - She feels the bulge affects how she has a BM and voids.   - The bulge is uncomfortable.   - Sometimes she pushes the bulge back in.   - She has some burning which she thinks is because she has been pushing the bulge back in repeatedly.   Rectal Symptoms:   - 1-2 days before she noticed the bulge, she strained hard because she was trying to hurry up and get to the phone.     Interval History:   - She has been squeezing her muscles for a week trying to hold the bulge in and has been having back spasms, so she has been taking Medrol and is going to have an injection as well.   - Hx of herniated discs.   - Back pain has been flared because of her trying to hold the POP in.       Physical Exam  Constitutional:       Appearance: She is normal weight.   Genitourinary:      Vulva  normal.      Genitourinary Comments: Myofascial pain on exam. So guarded on exam       Anterior and posterior vaginal prolapse present.     Mild vaginal atrophy present.     Cervix is not absent.      Uterus is absent.   POP-Q measurements were:      Aa: +3, Ba: +3, C: -2     gH: 3, pB: 3, TVL: 8     Ap: 0, Bp: 0-4, D:     Pelvic exam was performed with patient standing.   HENT:      Head: Normocephalic.   Pulmonary:      Effort: Pulmonary effort is normal.   Abdominal:      Palpations: Abdomen is soft.   Musculoskeletal:         General: Normal range of motion.   Neurological:      Mental Status: She is alert.   Psychiatric:         Mood and Affect: Mood normal.         Behavior: Behavior normal.         Thought Content: Thought content normal.         Judgment: Judgment normal.          Assessment and Plan  59 y.o. female being assessed for myofascial pelvic pain and apical & anterior vaginal wall prolapse. Co morbidities: ADHD, DM, back pain and herniated discs.     Diagnoses:   #1 Apical & anterior vaginal wall prolapse  #2 Myofascial pelvic pain     Plan:   1. Apical & anterior vaginal wall prolapse   - PVR = 0 ml by U/S.   - As long as patient can empty her bladder normally, some women choose to not treat the bulge.    - If it is bothersome, treatment can involve pessary use or surgery. She could learn to remove the pessary prior to intercourse. Pessaries can cause excessive discharge, but are not associated with infections. Pessaries may cause discomfort due to high tone pelvic floor. Generally after surgery, no heavy lifting (more than 10 lbs) for 6-8 weeks. Surgery can be done with or without mesh. She would need to find a caretaker for her mother while she is healing. She is s/p laparoscopic supracervical hyst for fibroids.   - She can apply Vaseline to protect the skin if she is concerned about the bulge rubbing against clothing.   - Patient desires surgical management of her POP. She can see Dr. Atwood  Dr. Billings, or Dr. Anderson.   - She will see Dr. Billings on 4/26 for a surgical consult.     2. Myofascial pelvic pain   - Advised patient to stop trying to squeeze her muscles at home because this is causing her PF muscle pain. POP is generally not painful and the cause of her pain on exam is actually myofascial pain.    - She declines PFPT because she does not have time with taking care of her mother and her job.      Follow-up with Dr. Billings for surgical consult.     Scribe Attestation:   Fouzia ÁLVAREZ, am scribing for virtually, and in the presence of JAZZ Remy on 4/15/24 at 3:49 PM.   I, JAZZ Remy, personally performed the services described in this documentation which was scribed virtually and I confirm that it is both accurate and complete.    JAZZ Remy

## 2024-04-22 ENCOUNTER — PREP FOR PROCEDURE (OUTPATIENT)
Dept: OBSTETRICS AND GYNECOLOGY | Facility: CLINIC | Age: 59
End: 2024-04-22
Payer: COMMERCIAL

## 2024-04-22 ENCOUNTER — OFFICE VISIT (OUTPATIENT)
Dept: OBSTETRICS AND GYNECOLOGY | Facility: CLINIC | Age: 59
End: 2024-04-22
Payer: COMMERCIAL

## 2024-04-22 VITALS — HEART RATE: 83 BPM | WEIGHT: 178.8 LBS | SYSTOLIC BLOOD PRESSURE: 117 MMHG | DIASTOLIC BLOOD PRESSURE: 79 MMHG

## 2024-04-22 DIAGNOSIS — N81.9 VAGINAL VAULT PROLAPSE: Primary | ICD-10-CM

## 2024-04-22 DIAGNOSIS — N81.4 UTEROVAGINAL PROLAPSE: ICD-10-CM

## 2024-04-22 DIAGNOSIS — Z01.818 PREOP TESTING: ICD-10-CM

## 2024-04-22 DIAGNOSIS — Z12.4 SCREENING FOR CERVICAL CANCER: ICD-10-CM

## 2024-04-22 DIAGNOSIS — N39.9 URINARY DISORDER: Primary | ICD-10-CM

## 2024-04-22 PROCEDURE — 87624 HPV HI-RISK TYP POOLED RSLT: CPT | Performed by: OBSTETRICS & GYNECOLOGY

## 2024-04-22 PROCEDURE — 99214 OFFICE O/P EST MOD 30 MIN: CPT | Performed by: OBSTETRICS & GYNECOLOGY

## 2024-04-22 PROCEDURE — 88175 CYTOPATH C/V AUTO FLUID REDO: CPT | Mod: TC,GCY,AHULAB | Performed by: OBSTETRICS & GYNECOLOGY

## 2024-04-22 RX ORDER — GABAPENTIN 600 MG/1
600 TABLET ORAL ONCE
OUTPATIENT
Start: 2024-04-22 | End: 2024-04-22

## 2024-04-22 RX ORDER — ACETAMINOPHEN 325 MG/1
975 TABLET ORAL ONCE
OUTPATIENT
Start: 2024-04-22 | End: 2024-04-22

## 2024-04-22 RX ORDER — CELECOXIB 400 MG/1
400 CAPSULE ORAL ONCE
OUTPATIENT
Start: 2024-04-22 | End: 2024-04-22

## 2024-04-22 RX ORDER — PHENAZOPYRIDINE HYDROCHLORIDE 200 MG/1
200 TABLET, FILM COATED ORAL ONCE
OUTPATIENT
Start: 2024-04-22 | End: 2024-04-22

## 2024-04-22 ASSESSMENT — PAIN SCALES - GENERAL: PAINLEVEL: 4

## 2024-04-26 ENCOUNTER — APPOINTMENT (OUTPATIENT)
Dept: OBSTETRICS AND GYNECOLOGY | Facility: CLINIC | Age: 59
End: 2024-04-26
Payer: COMMERCIAL

## 2024-04-28 ASSESSMENT — ENCOUNTER SYMPTOMS
MUSCULOSKELETAL NEGATIVE: 1
CARDIOVASCULAR NEGATIVE: 1
RESPIRATORY NEGATIVE: 1
EYES NEGATIVE: 1
NEUROLOGICAL NEGATIVE: 1
CONSTITUTIONAL NEGATIVE: 1
GASTROINTESTINAL NEGATIVE: 1
ENDOCRINE NEGATIVE: 1
FREQUENCY: 0
PSYCHIATRIC NEGATIVE: 1

## 2024-04-28 NOTE — PROGRESS NOTES
Urogynecology  Provider:  Jocelyn Atwood MD  [phone]              ASSESSMENT AND PLAN:     Problem List Items Addressed This Visit    None  Visit Diagnoses       Urinary disorder    -  Primary    Relevant Orders    Uroflowmetry    Screening for cervical cancer        Relevant Orders    THINPREP PAP TEST    HPV DNA High Risk With Genotype    Uterovaginal prolapse                    I spent a total of eConsult Time: 40 minutes in face to face and non face to face time.        Jocelyn Atwood MD        HISTORY OF PRESENT ILLNESS:   Zenaida Morales is a 59 y.o. female who presents today for evaluation of myofascial pelvic pain and apical & anterior vaginal wall prolapse. She was last evaluated by Greg Sam NP on 4/15/2024 (See below), but is a new patient to me. 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 In terms of her symptoms, she states it began suddenly. She has associated pelvic floor tenderness, one episode of incontinence with coughing/sneezing. She notes her symptoms are worse after standing for a long period of time, and laying flat can help. The patient has not attempted PFPT due to the lack of time in her schedule. The patient does not have a history of abnormal PAPs, she has not had a PAP since her laparoscopic supracervical hysterectomy ~20 years ago. She does note a large amount of anxiety in regards to women's health exams and procedures due to a bad experience with an endometrial biopsy. The patient has one child, delivered vaginally.    Saw CB 4/15/24  POP-Q measurements were:      Aa: +3, Ba: +3, C: -2     gH: 3, pB: 3, TVL: 8     Ap: 0, Bp: 0 D: -4    59 y.o. female being assessed for myofascial pelvic pain and apical & anterior vaginal wall prolapse. Co morbidities: ADHD, DM, back pain and herniated discs.      Diagnoses:   #1 Apical & anterior vaginal wall prolapse  #2 Myofascial pelvic pain      Plan:   1. Apical &  anterior vaginal wall prolapse   - PVR = 0 ml by U/S.   - As long as patient can empty her bladder normally, some women choose to not treat the bulge.    - If it is bothersome, treatment can involve pessary use or surgery. She could learn to remove the pessary prior to intercourse. Pessaries can cause excessive discharge, but are not associated with infections. Pessaries may cause discomfort due to high tone pelvic floor. Generally after surgery, no heavy lifting (more than 10 lbs) for 6-8 weeks. Surgery can be done with or without mesh. She would need to find a caretaker for her mother while she is healing. She is s/p laparoscopic supracervical hyst for fibroids.   - She can apply Vaseline to protect the skin if she is concerned about the bulge rubbing against clothing.   - Patient desires surgical management of her POP. She can see Dr. Atwood, Dr. Billings, or Dr. Anderson.     Record Review:     Sexual Activity:   + sexually active       Past Medical History:      No past medical history on file.       Past Surgical History:       No past surgical history on file.      Medications:       Prior to Admission medications    Medication Sig Start Date End Date Taking? Authorizing Provider   atorvastatin (Lipitor) 40 mg tablet Take 1 tablet (40 mg) by mouth once daily.    Historical Provider, MD   carvedilol (Coreg) 6.25 mg tablet Take 1 tablet (6.25 mg) by mouth 2 times a day.    Historical Provider, MD   Jardiance 25 mg Take 1 tablet (25 mg) by mouth once daily.    Historical Provider, MD GARCIA  Review of Systems   Constitutional: Negative.    HENT: Negative.     Eyes: Negative.    Respiratory: Negative.     Cardiovascular: Negative.    Gastrointestinal: Negative.    Endocrine: Negative.    Genitourinary:  Positive for pelvic pain. Negative for frequency and urgency.   Musculoskeletal: Negative.    Neurological: Negative.    Psychiatric/Behavioral: Negative.          POC Blood, Urine   Date Value Ref Range  "Status   04/15/2024 NEGATIVE NEGATIVE Final     Poc Nitrite, Urine   Date Value Ref Range Status   04/15/2024 NEGATIVE NEGATIVE Final     POC Urobilinogen, Urine   Date Value Ref Range Status   04/15/2024 0.2 0.2, 1.0 EU/DL Final     POC Leukocytes, Urine   Date Value Ref Range Status   04/15/2024 NEGATIVE NEGATIVE Final         PHYSICAL EXAM:      /79 (BP Location: Left arm, Patient Position: Sitting, BP Cuff Size: Adult)   Pulse 83   Wt 81.1 kg (178 lb 12.8 oz)      No LMP recorded.      Declines chaperone for physical exam.      Well developed, well nourished, in no apparent distress.   Neurologic/Psychiatric:  Awake, Alert and Oriented times 3.  Affect normal.     GENITAL/URINARY:       External Genitalia:  The patient has normal appearing external genitalia, normal skenes and bartholins glands, and a normal hair distribution.  Her vulva is without lesions, erythema or discharge.  It is non-tender with appropriate sensation.     Urethral Meatus:  Size normal, Location normal, Lesions absent, Prolapse absent,      Urethra:  Fullness absent, Masses absent,      Bladder:  Fullness absent, Masses absent, Tenderness absent,      Vagina:  General appearance normal, Estrogen effect normal, Discharge absent, Lesions absent    Cervix: Normal, no discharge.   Uterus:  surgically absent  Adnexa: normal     Anus/Perineum:  Lesions absent and Masses absent normal sphincter tone, no lesions  Normal Perineum    Stress urinary incontinence not demonstrable.         POP-Q:  Stage: 0  Position: standing    Aa: 0       Ba:  C: -8   Gh:  Pb:  TVL: 9         x x D: x             Data and DIAGNOSTIC STUDIES REVIEWED   No results found.   No results found for: \"URINECULTURE\", \"UAMICCOMM\"   No results found for: \"GLUCOSE\", \"CALCIUM\", \"NA\", \"K\", \"CO2\", \"CL\", \"BUN\", \"CREATININE\"No results found for: \"WBC\", \"HGB\", \"HCT\", \"MCV\", \"PLT\"     Assessment:   Zenaida Morales is a 59 y.o. female who struggles with myofascial pelvic pain and " apical & anterior vaginal wall prolapse. Her comorbidities include ADHD, DM, back pain and herniated discs.    Diagnosis List:   1. - Uterovaginal Prolapse  2. - Screening for cervical cancer    Plan:  1. Uterovaginal Prolapse  - We discussed that in order to correctly treat her urinary issues we recommend pursuing UDS to fully assess/diagnose the type of urinary problems she is having (i.e., MILY vs. ISD vs. OAB). Urodynamics assesses bladder function and capacity, voiding/filling patterns, urethral strength, assess if there is DOUGLAS, MUCP, etc.  - Ordered urodynamics to better characterize her voiding/bladder filling patterns and further characterize her urinary issues.  -Genia to schedule robotic suspension surgery     2. Screening for cervical cancer  - Speculum exam performed and pap collected today with adequate sample size.   - We will call the patient in 2+ weeks with the cytology report if results are abnormal and discussed that results will be uploaded to the patient portal to review after they have been resulted by the pathologist.     Follow up after testing and surgery.    Scribe Attestation  By signing my name below, I, Kassi Crocker, attest that this documentation has been prepared under the direction and in the presence of Jocelyn Atwood MD on 04/22/2024 at 5:48 PM.     Agree with above. I Dr. Atwood, personally performed the services described in the documentation which was scribed virtually and confirm it is both complete and accurate.  Jocelyn Atwood MD

## 2024-05-02 DIAGNOSIS — Z01.818 PREOPERATIVE TESTING: Primary | ICD-10-CM

## 2024-05-02 PROBLEM — N81.9 VAGINAL VAULT PROLAPSE: Status: ACTIVE | Noted: 2024-04-22

## 2024-05-03 ENCOUNTER — TELEPHONE (OUTPATIENT)
Dept: OBSTETRICS AND GYNECOLOGY | Facility: CLINIC | Age: 59
End: 2024-05-03
Payer: COMMERCIAL

## 2024-05-03 NOTE — TELEPHONE ENCOUNTER
Call to patient on 5/2/2024 to schedule surgery with Dr Atwood at [facility] for a robotic sacrocolpopexy, posterior repair with possible perineorraphy and cystoscopy. Patient agrees with date of 7/5/2024. CPM has been ordered.

## 2024-05-06 LAB
CYTOLOGY CMNT CVX/VAG CYTO-IMP: NORMAL
HPV HR 12 DNA GENITAL QL NAA+PROBE: NEGATIVE
HPV HR GENOTYPES PNL CVX NAA+PROBE: NEGATIVE
HPV16 DNA SPEC QL NAA+PROBE: NEGATIVE
HPV18 DNA SPEC QL NAA+PROBE: NEGATIVE
LAB AP HPV GENOTYPE QUESTION: YES
LAB AP HPV HR: NORMAL
LABORATORY COMMENT REPORT: NORMAL
PATH REPORT.TOTAL CANCER: NORMAL

## 2024-05-07 ENCOUNTER — PROCEDURE VISIT (OUTPATIENT)
Dept: OBSTETRICS AND GYNECOLOGY | Facility: CLINIC | Age: 59
End: 2024-05-07
Payer: COMMERCIAL

## 2024-05-07 DIAGNOSIS — N39.3 FEMALE STRESS INCONTINENCE: Primary | ICD-10-CM

## 2024-05-07 DIAGNOSIS — N39.9 URINARY DISORDER: ICD-10-CM

## 2024-05-07 PROCEDURE — 51741 ELECTRO-UROFLOWMETRY FIRST: CPT | Performed by: OBSTETRICS & GYNECOLOGY

## 2024-05-07 PROCEDURE — 51729 CYSTOMETROGRAM W/VP&UP: CPT | Performed by: OBSTETRICS & GYNECOLOGY

## 2024-05-07 PROCEDURE — 51797 INTRAABDOMINAL PRESSURE TEST: CPT | Performed by: OBSTETRICS & GYNECOLOGY

## 2024-05-07 PROCEDURE — 51784 ANAL/URINARY MUSCLE STUDY: CPT | Performed by: OBSTETRICS & GYNECOLOGY

## 2024-05-07 PROCEDURE — 51798 US URINE CAPACITY MEASURE: CPT | Performed by: OBSTETRICS & GYNECOLOGY

## 2024-05-13 ENCOUNTER — TELEMEDICINE (OUTPATIENT)
Dept: OBSTETRICS AND GYNECOLOGY | Facility: CLINIC | Age: 59
End: 2024-05-13
Payer: COMMERCIAL

## 2024-05-13 ENCOUNTER — TELEPHONE (OUTPATIENT)
Dept: OBSTETRICS AND GYNECOLOGY | Facility: CLINIC | Age: 59
End: 2024-05-13
Payer: COMMERCIAL

## 2024-05-13 DIAGNOSIS — N32.81 OVERACTIVE BLADDER: Primary | ICD-10-CM

## 2024-05-13 PROCEDURE — 99212 OFFICE O/P EST SF 10 MIN: CPT | Performed by: OBSTETRICS & GYNECOLOGY

## 2024-05-13 ASSESSMENT — ENCOUNTER SYMPTOMS
RESPIRATORY NEGATIVE: 1
EYES NEGATIVE: 1
GASTROINTESTINAL NEGATIVE: 1
NEUROLOGICAL NEGATIVE: 1
CONSTITUTIONAL NEGATIVE: 1
PSYCHIATRIC NEGATIVE: 1
MUSCULOSKELETAL NEGATIVE: 1
ENDOCRINE NEGATIVE: 1
CARDIOVASCULAR NEGATIVE: 1

## 2024-05-13 NOTE — TELEPHONE ENCOUNTER
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 She will not have help with her mom, due to other family members on vacation. She requested to move surgery from 7/5/2024 to 7/19/2024. Request sent to Minnie.

## 2024-05-13 NOTE — PROGRESS NOTES
11Urogynecology  Provider:  Jocelyn Atwood MD  [phone]              ASSESSMENT AND PLAN:   59 y.o. patient presenting for UDS results pre-surgery.    Diagnoses:  #1 MILY    Plan:  MILY  - We will proceed with the sling procedure to support the urethra and prevent leakage.   > We discussed risks associated with MUS with the patient including infection, bleeding, damage to surrounding organs, need for further surgery, and a small chance of needing to adjust the sling post-operatively.  - We discussed with the patient that she would have a catheter post-surgery, which will be removed the following day. We advised her that she will be limited to no lifting of more than 10 lbs for 6 weeks as advised.    Follow-up appointments are scheduled for 2, 6, and 13 weeks post-surgery, with annual check-ups thereafter to monitor the status.    Virtual visit today: The patient's identity was confirmed and that she is located in [place].   Jocelyn Atwood MD identified herself and the patient consented to a telehealth visit today. Telehealth visit time:      Scribe Attestation  By signing my name below, IEddie Scribe, attest that this documentation has been prepared under the direction and in the presence of Jocelyn Atwood MD on 05/13/2024 at 8:56 PM.          Problem List Items Addressed This Visit    None          I spent a total of 11 minutes in face to face and non face to face time at this virtual visit.          Jocelyn Atwood MD        HISTORY OF PRESENT ILLNESS:     For OR 7/5/24    Procedure Laterality Anesthesia   Robot Assisted Sacrocolpopexy; Posterior Repair; Possible Perineorrhahpy [37141 (CPT®)] N/A General   Cystoscopy        Udn with +MILY and +ISD         Urinary Symptoms:   - The patient has been experiencing urinary leakage when coughing or sneezing for years. The issue eventually got severe enough that the patient needed to wear a panty liner constantly. However, there has been some  "improvement recently after losing a bit of weight and stopping Cinepro, which had caused persistent coughing.  - The patient is scheduled for surgery to address vaginal prolapse and urinary incontinence.             Past Medical History:       No past medical history on file.       Past Surgical History:       No past surgical history on file.      Medications:       Prior to Admission medications    Medication Sig Start Date End Date Taking? Authorizing Provider   atorvastatin (Lipitor) 40 mg tablet Take 1 tablet (40 mg) by mouth once daily.    Historical Provider, MD   carvedilol (Coreg) 6.25 mg tablet Take 1 tablet (6.25 mg) by mouth 2 times a day.    Historical Provider, MD   Jardiance 25 mg Take 1 tablet (25 mg) by mouth once daily.    Historical Provider, MD GARCIA  Review of Systems   Constitutional: Negative.    HENT: Negative.     Eyes: Negative.    Respiratory: Negative.     Cardiovascular: Negative.    Gastrointestinal: Negative.    Endocrine: Negative.    Musculoskeletal: Negative.    Neurological: Negative.    Psychiatric/Behavioral: Negative.            Data and DIAGNOSTIC STUDIES REVIEWED   No results found.   No results found for: \"URINECULTURE\", \"UAMICCOMM\"   No results found for: \"GLUCOSE\", \"CALCIUM\", \"NA\", \"K\", \"CO2\", \"CL\", \"BUN\", \"CREATININE\"No results found for: \"WBC\", \"HGB\", \"HCT\", \"MCV\", \"PLT\"         "

## 2024-05-13 NOTE — PROGRESS NOTES
Patient ID: Zenaida Morales is a 59 y.o. female.    Uroflowmetry    Date/Time: 5/7/2024 9:48 AM    Performed by: Marcia Raines MA  Authorized by: Jocelyn Atwood MD    Procedure discussed: discussed risks, benefits and alternatives    Chaperone present: no    Timeout: timeout called immediately prior to procedure    Prep: patient was prepped and draped in usual sterile fashion    Position: sitting    Procedure Details     Procedure: CMG with UPP/voiding pressures, EMG, intra-abdominal voiding study and uroflow      CMG with UPP/Voiding Pressures Details:     First urge to void (mL): 118    Urgency to void (mL): 174    Bladder capacity (mL): 277    Intra-abdominal Voiding Study Details:     Rectal catheter placed to record intra-abdominal pressure: yes      Uroflow Details:     Pre-void volume (mL): 129.7    Average flow rate (mL/sec): 2.9    Max flow rate (mL/sec): 7.1    Voided urine (mL): 242    Residual urine (mL): 34    Post-Procedure Details     Outcome: patient tolerated procedure well with no complications      Additional Details      + stress incontinence     +MILY with ISD based on CLPP. Pushed out urethral catheter with voiding.  Jocelyn Atwood MD

## 2024-05-14 ENCOUNTER — TELEPHONE (OUTPATIENT)
Dept: OBSTETRICS AND GYNECOLOGY | Facility: CLINIC | Age: 59
End: 2024-05-14
Payer: COMMERCIAL

## 2024-05-15 ENCOUNTER — TELEPHONE (OUTPATIENT)
Dept: OBSTETRICS AND GYNECOLOGY | Facility: CLINIC | Age: 59
End: 2024-05-15
Payer: COMMERCIAL

## 2024-05-15 NOTE — TELEPHONE ENCOUNTER
----- Message from Jocelyn Atwood MD sent at 5/9/2024 10:06 AM EDT -----  Please notify pt of normal pap. Repeat in 3-5 years

## 2024-05-15 NOTE — TELEPHONE ENCOUNTER
Result Communication    Resulted Orders   THINPREP PAP TEST   Result Value Ref Range    Case Report       Gynecologic Cytology                              Case: [id number]                                   Authorizing Provider:  Jocelyn Atwood MD     Collected:           04/22/2024 1103              [facility]   Received:            04/22/2024 [address] [place] [address]                                                                First Screen:          SOY Kwong                                                          Rescreen:              SOY Jones                                                               Specimen:    ThinPrep Liquid-Based Pap-Imaging System Screen, CERVIX, SCREENING                         Final Cytological Interpretation           A. THINPREP PAP CERVIX, SCREENING -     Specimen Adequacy  Satisfactory for evaluation; absence of endocervical/transformation zone component    General Categorization  Negative for intraepithelial lesion or malignancy.    Descriptive Interpretation  Negative for intraepithelial lesion or malignancy                    Slide(s) initially screened by SOY Kwong at [facility] [address] [facility] [id number]  QC review performed by SOY Jones at [facility] [place] [id number]  By the signature on this report, the individual or group listed as making the Final Interpretation/Diagnosis certifies that they have reviewed this case.       ThinPrep Imaging System       This specimen has been analyzed by the ThinPrep Imaging System ([employer] an automated imaging and review system, which assists the laboratory in evaluating cells on ThinPrep Pap tests. Following automated imaging, selected fields from every slide were reviewed by a cytotechnologist and/or pathologist.        Educational Note       Cervical cytology is a  screening procedure primarily for squamous cancers and precursors and has associated false-negative and false-positives results as evidenced by published data. Your patient's test should be interpreted in this context, together with the patient's history and clinical findings. Regular sampling and follow-up of unexplained clinical signs and symptoms are recommended to minimize false negative results.      Perform HPV HR test? Always (all interpretations)     Include HPV Genotype? Yes    HPV DNA High Risk With Genotype   Result Value Ref Range    HPV, high-risk Negative Negative    HPV Type 16 DNA Negative Negative    HPV Type 18 DNA Negative Negative    HPV non-Type 16 or 18 DNA Negative Negative    Narrative     Testing for high-risk (HR) types of human papilloma virus (HPV) is performed by the [employer] eduardo HPV Test. The eduardo HPV Test is a qualitative polymerase chain reaction that amplifies DNA of HPV16, HPV18, and 12 other high-risk HPV types (31, 33, 35, 39, 45, 51, 52, 56, 58, 59, 66, and 68) associated with cervical cancer and its precursor lesions. A positive result indicates the presence of HPV DNA due to one or more of the 14 genotypes: 16, 18, 31, 33, 35, 39, 45, 51, 52, 56, 58, 59, 66, and 68. Negative results indicates HPV DNA concentrations are undectectable or below the pre-set threshold for detection. False negative results may be associated with unoptimized sampling. A negative HR HPV result does not exclude the possibility of future cytologic HSIL or underlying CIN2-3 or cancer.   This test is approved by the US Food and Drug Administration. Results of this test should be interpreted in conjunction with the patient Pap test results. Please refer to ASCCP current quidelines for the use of HPV DNA testing, result interpretation, and patient management.   The performance of this test was verified by the [facility] at [facility]. The lab is  certified under the Clinical Laboratory Amendments of 1988 (CLIA 88) as qualified to perform high complexity clinical laboratory testing.    PERFORMING LAB LOCATIONS  [facility]: [address] [place] [address]         2:43 PM      Results were successfully communicated with the patient and they acknowledged their understanding.

## 2024-07-09 ENCOUNTER — TELEPHONE (OUTPATIENT)
Dept: OBSTETRICS AND GYNECOLOGY | Facility: CLINIC | Age: 59
End: 2024-07-09
Payer: COMMERCIAL

## 2024-07-10 DIAGNOSIS — N81.9 VAGINAL VAULT PROLAPSE: Primary | ICD-10-CM

## 2024-07-10 DIAGNOSIS — E11.9 TYPE 2 DIABETES MELLITUS WITHOUT COMPLICATION, UNSPECIFIED WHETHER LONG TERM INSULIN USE (MULTI): ICD-10-CM

## 2024-07-11 ENCOUNTER — CLINICAL SUPPORT (OUTPATIENT)
Dept: PREADMISSION TESTING | Facility: HOSPITAL | Age: 59
End: 2024-07-11
Payer: COMMERCIAL

## 2024-07-11 DIAGNOSIS — E11.9 TYPE 2 DIABETES MELLITUS WITHOUT COMPLICATION, UNSPECIFIED WHETHER LONG TERM INSULIN USE (MULTI): ICD-10-CM

## 2024-07-11 DIAGNOSIS — N81.9 VAGINAL VAULT PROLAPSE: ICD-10-CM

## 2024-07-11 RX ORDER — MINERAL OIL
180 ENEMA (ML) RECTAL EVERY OTHER DAY
COMMUNITY

## 2024-07-11 RX ORDER — METHYLPHENIDATE HYDROCHLORIDE 10 MG/1
10 TABLET ORAL AS NEEDED
COMMUNITY
Start: 2024-05-07

## 2024-07-11 RX ORDER — ACETAMINOPHEN 325 MG/1
325 TABLET ORAL EVERY 6 HOURS PRN
COMMUNITY
End: 2024-07-25

## 2024-07-11 RX ORDER — POTASSIUM CHLORIDE 1.5 G/1.58G
20 POWDER, FOR SOLUTION ORAL AS NEEDED
COMMUNITY

## 2024-07-11 RX ORDER — LORATADINE 10 MG/1
10 TABLET ORAL EVERY OTHER DAY
COMMUNITY

## 2024-07-11 RX ORDER — DICLOFENAC SODIUM 10 MG/G
4 GEL TOPICAL 4 TIMES DAILY PRN
COMMUNITY

## 2024-07-11 RX ORDER — VALSARTAN 80 MG/1
80 TABLET ORAL DAILY
COMMUNITY

## 2024-07-11 RX ORDER — PANTOPRAZOLE SODIUM 40 MG/1
40 TABLET, DELAYED RELEASE ORAL
COMMUNITY

## 2024-07-11 RX ORDER — FUROSEMIDE 40 MG/1
40 TABLET ORAL AS NEEDED
COMMUNITY

## 2024-07-12 NOTE — TELEPHONE ENCOUNTER
Pt informed [facility] paperwork was sent. All questions answered, & Zenaida Morales verbalized understanding.

## 2024-07-16 ENCOUNTER — PRE-ADMISSION TESTING (OUTPATIENT)
Dept: PREADMISSION TESTING | Facility: HOSPITAL | Age: 59
End: 2024-07-16
Payer: COMMERCIAL

## 2024-07-16 ENCOUNTER — LAB (OUTPATIENT)
Dept: LAB | Facility: LAB | Age: 59
End: 2024-07-16
Payer: COMMERCIAL

## 2024-07-16 VITALS
RESPIRATION RATE: 16 BRPM | HEART RATE: 84 BPM | SYSTOLIC BLOOD PRESSURE: 105 MMHG | DIASTOLIC BLOOD PRESSURE: 67 MMHG | WEIGHT: 184.75 LBS | HEIGHT: 66 IN | BODY MASS INDEX: 29.69 KG/M2 | TEMPERATURE: 98.3 F | OXYGEN SATURATION: 94 %

## 2024-07-16 DIAGNOSIS — Z01.818 PREOP TESTING: Primary | ICD-10-CM

## 2024-07-16 LAB
ABO GROUP (TYPE) IN BLOOD: NORMAL
ANION GAP SERPL CALC-SCNC: 12 MMOL/L (ref 10–20)
ANTIBODY SCREEN: NORMAL
APPEARANCE UR: CLEAR
ATRIAL RATE: 86 BPM
BASOPHILS # BLD AUTO: 0.03 X10*3/UL (ref 0–0.1)
BASOPHILS NFR BLD AUTO: 0.4 %
BILIRUB UR STRIP.AUTO-MCNC: NEGATIVE MG/DL
BUN SERPL-MCNC: 16 MG/DL (ref 6–23)
CALCIUM SERPL-MCNC: 9.2 MG/DL (ref 8.6–10.3)
CHLORIDE SERPL-SCNC: 104 MMOL/L (ref 98–107)
CO2 SERPL-SCNC: 28 MMOL/L (ref 21–32)
COLOR UR: ABNORMAL
CREAT SERPL-MCNC: 0.57 MG/DL (ref 0.5–1.05)
EGFRCR SERPLBLD CKD-EPI 2021: >90 ML/MIN/1.73M*2
EOSINOPHIL # BLD AUTO: 0.36 X10*3/UL (ref 0–0.7)
EOSINOPHIL NFR BLD AUTO: 5.3 %
ERYTHROCYTE [DISTWIDTH] IN BLOOD BY AUTOMATED COUNT: 12.7 % (ref 11.5–14.5)
EST. AVERAGE GLUCOSE BLD GHB EST-MCNC: 131 MG/DL
GLUCOSE SERPL-MCNC: 126 MG/DL (ref 74–99)
GLUCOSE UR STRIP.AUTO-MCNC: ABNORMAL MG/DL
HBA1C MFR BLD: 6.2 %
HCT VFR BLD AUTO: 43.6 % (ref 36–46)
HGB BLD-MCNC: 14 G/DL (ref 12–16)
HOLD SPECIMEN: NORMAL
IMM GRANULOCYTES # BLD AUTO: 0.02 X10*3/UL (ref 0–0.7)
IMM GRANULOCYTES NFR BLD AUTO: 0.3 % (ref 0–0.9)
KETONES UR STRIP.AUTO-MCNC: NEGATIVE MG/DL
LEUKOCYTE ESTERASE UR QL STRIP.AUTO: NEGATIVE
LYMPHOCYTES # BLD AUTO: 1.75 X10*3/UL (ref 1.2–4.8)
LYMPHOCYTES NFR BLD AUTO: 26 %
MCH RBC QN AUTO: 29.9 PG (ref 26–34)
MCHC RBC AUTO-ENTMCNC: 32.1 G/DL (ref 32–36)
MCV RBC AUTO: 93 FL (ref 80–100)
MONOCYTES # BLD AUTO: 0.49 X10*3/UL (ref 0.1–1)
MONOCYTES NFR BLD AUTO: 7.3 %
NEUTROPHILS # BLD AUTO: 4.09 X10*3/UL (ref 1.2–7.7)
NEUTROPHILS NFR BLD AUTO: 60.7 %
NITRITE UR QL STRIP.AUTO: NEGATIVE
NRBC BLD-RTO: 0 /100 WBCS (ref 0–0)
P AXIS: 58 DEGREES
P OFFSET: 165 MS
P ONSET: 110 MS
PH UR STRIP.AUTO: 5.5 [PH]
PLATELET # BLD AUTO: 269 X10*3/UL (ref 150–450)
POTASSIUM SERPL-SCNC: 4.5 MMOL/L (ref 3.5–5.3)
PR INTERVAL: 226 MS
PROT UR STRIP.AUTO-MCNC: NEGATIVE MG/DL
Q ONSET: 223 MS
QRS COUNT: 14 BEATS
QRS DURATION: 110 MS
QT INTERVAL: 398 MS
QTC CALCULATION(BAZETT): 476 MS
QTC FREDERICIA: 449 MS
R AXIS: -42 DEGREES
RBC # BLD AUTO: 4.69 X10*6/UL (ref 4–5.2)
RBC # UR STRIP.AUTO: NEGATIVE /UL
RH FACTOR (ANTIGEN D): NORMAL
SODIUM SERPL-SCNC: 139 MMOL/L (ref 136–145)
SP GR UR STRIP.AUTO: 1.02
T AXIS: 78 DEGREES
T OFFSET: 422 MS
UROBILINOGEN UR STRIP.AUTO-MCNC: NORMAL MG/DL
VENTRICULAR RATE: 86 BPM
WBC # BLD AUTO: 6.7 X10*3/UL (ref 4.4–11.3)

## 2024-07-16 PROCEDURE — 87081 CULTURE SCREEN ONLY: CPT | Mod: AHULAB | Performed by: PHYSICIAN ASSISTANT

## 2024-07-16 PROCEDURE — 36415 COLL VENOUS BLD VENIPUNCTURE: CPT

## 2024-07-16 PROCEDURE — 99204 OFFICE O/P NEW MOD 45 MIN: CPT | Performed by: PHYSICIAN ASSISTANT

## 2024-07-16 PROCEDURE — 81003 URINALYSIS AUTO W/O SCOPE: CPT

## 2024-07-16 PROCEDURE — 93005 ELECTROCARDIOGRAM TRACING: CPT | Performed by: PHYSICIAN ASSISTANT

## 2024-07-16 PROCEDURE — 83036 HEMOGLOBIN GLYCOSYLATED A1C: CPT

## 2024-07-16 PROCEDURE — 86901 BLOOD TYPING SEROLOGIC RH(D): CPT

## 2024-07-16 PROCEDURE — 85025 COMPLETE CBC W/AUTO DIFF WBC: CPT

## 2024-07-16 PROCEDURE — 80048 BASIC METABOLIC PNL TOTAL CA: CPT

## 2024-07-16 PROCEDURE — 86850 RBC ANTIBODY SCREEN: CPT

## 2024-07-16 PROCEDURE — 86900 BLOOD TYPING SEROLOGIC ABO: CPT

## 2024-07-16 RX ORDER — CHLORHEXIDINE GLUCONATE ORAL RINSE 1.2 MG/ML
SOLUTION DENTAL
Qty: 475 ML | Refills: 0 | Status: SHIPPED | OUTPATIENT
Start: 2024-07-16

## 2024-07-16 ASSESSMENT — ENCOUNTER SYMPTOMS
FEVER: 0
CHILLS: 0
DYSPNEA AT REST: 0
WEAKNESS: 0
PALPITATIONS: 0
EYE PAIN: 0
CONFUSION: 0
LIGHT-HEADEDNESS: 0
SKIN CHANGES: 0
EYE DISCHARGE: 0
UNEXPECTED WEIGHT CHANGE: 0
BRUISES/BLEEDS EASILY: 0
SHORTNESS OF BREATH: 0
DIARRHEA: 0
DYSURIA: 0
NAUSEA: 0
HEMOPTYSIS: 0
NECK STIFFNESS: 0
DYSPNEA WITH EXERTION: 0
COUGH: 0
VOMITING: 0
EXCESSIVE BLEEDING: 0
TROUBLE SWALLOWING: 0
ABDOMINAL DISTENTION: 0
DOUBLE VISION: 0
VISUAL CHANGE: 0
WHEEZING: 0
WOUND: 0
CONSTIPATION: 0
ARTHRALGIAS: 1
NUMBNESS: 0
ABDOMINAL PAIN: 0
RHINORRHEA: 0
LIMITED RANGE OF MOTION: 0
DIFFICULTY URINATING: 0
BLOOD IN STOOL: 0
SINUS CONGESTION: 0
MYALGIAS: 0
NECK PAIN: 0

## 2024-07-16 NOTE — PREPROCEDURE INSTRUCTIONS
Medication List            Accurate as of July 16, 2024  8:41 AM. Always use your most recent med list.                acetaminophen 325 mg tablet  Commonly known as: Tylenol  Medication Adjustments for Surgery: Take morning of surgery with sip of water, no other fluids  Notes to patient: If needed     atorvastatin 40 mg tablet  Commonly known as: Lipitor  Medication Adjustments for Surgery: Take morning of surgery with sip of water, no other fluids     carvedilol 6.25 mg tablet  Commonly known as: Coreg  Medication Adjustments for Surgery: Take morning of surgery with sip of water, no other fluids     chlorhexidine 0.12 % solution  Commonly known as: Peridex  Swish for 30 seconds and spit 15mL of solution the night before and morning of surgery     Claritin 10 mg tablet  Generic drug: loratadine  Medication Adjustments for Surgery: Take morning of surgery with sip of water, no other fluids  Notes to patient: If needed     diclofenac sodium 1 % gel  Commonly known as: Voltaren  Medication Adjustments for Surgery: Continue until night before surgery     fexofenadine 180 mg tablet  Commonly known as: Allegra  Medication Adjustments for Surgery: Take morning of surgery with sip of water, no other fluids  Notes to patient: If needed     furosemide 40 mg tablet  Commonly known as: Lasix  Medication Adjustments for Surgery: Take morning of surgery with sip of water, no other fluids  Notes to patient: **taking for h/o CHF**     Jardiance 25 mg  Generic drug: empagliflozin  Medication Adjustments for Surgery: Stop 3 days before surgery     methylphenidate 10 mg tablet  Commonly known as: Ritalin  Medication Adjustments for Surgery: Continue until night before surgery     pantoprazole 40 mg EC tablet  Commonly known as: ProtoNix  Medication Adjustments for Surgery: Take morning of surgery with sip of water, no other fluids     potassium chloride 20 mEq packet  Commonly known as: Klor-Con  Medication Adjustments for  Surgery: Continue until night before surgery     valsartan 80 mg tablet  Commonly known as: Diovan  Notes to patient: HOLD evening prior to surgery and morning of surgery                             **Concerning above medication instructions, if medication is normally taken at night, continue normal schedule.**  **DO NOT TAKE NIGHT PRIOR AND MORNING OF SURGERY**    CONTACT SURGEON'S OFFICE IF YOU DEVELOP:  * Fever = 100.4 F   * New respiratory symptoms (e.g. cough, shortness of breath, respiratory distress, sore throat)  * Recent loss of taste or smell  *Flu like symptoms such as headache, fatigue or gastrointestinal symptoms  * You develop any open sores, shingles, burning or painful urination   AND/OR:  * You no longer wish to have the surgery.  * Any other personal circumstances change that may lead to the need to cancel or defer this surgery.  *You were admitted to any hospital within one week of your planned procedure.    SMOKING:  *Quitting smoking can make a huge difference to your health and recovery from surgery.    *If you need help with quitting, call 9-142-QUIT-NOW.    THE DAY BEFORE SURGERY:   Nothing by mouth (no solids or liquids) after midnight.   If diabetic, please check fasting blood sugar upon waking up.    If fasting sugar is <80 mg/dl, please drink 100ml/3oz of apple juice no later than 2 hours prior to arrival time.      SURGICAL TIME  *You will be contacted between 2 p.m. and 6 p.m. the business day before your surgery with your arrival time.  *If you haven't received a call by 6pm, call [phone].  *Scheduled surgery times may change and you will be notified if this occurs-check your personal voicemail for any updates.    ON THE MORNING OF SURGERY:  *Wear comfortable, loose fitting clothing.   *Do not use moisturizers, creams, lotions or perfume.  *All jewelry and valuables should be left at home.  *Prosthetic devices such as contact lenses, hearing aids, dentures, eyelash extensions,  hairpins and body piercing must be removed before surgery.    BRING WITH YOU:  *Photo ID and insurance card  *Current list of medicines and allergies  *Pacemaker/Defibrillator/Heart stent cards  *CPAP machine and mask  *Slings/splints/crutches  *Copy of your complete Advanced Directive/DHPOA-if applicable  *Neurostimulator implant remote    PARKING AND ARRIVAL:  *Check in at the Main Entrance desk and let them know you are here for surgery.  *You will be directed to the 2nd floor surgical waiting area.    AFTER OUTPATIENT SURGERY:  *A responsible adult MUST accompany you at the time of discharge and stay with you for 24 hours after your surgery.  *You may NOT drive yourself home after surgery.  *You may use a taxi or ride sharing service ([employer], [employer]) to return home ONLY if you are accompanied by a friend or family member.  *Instructions for resuming your medications will be provided by your surgeon.        Patient Information: Oral/Dental Rinse  **This is a prescription; pick it up at your preferred local pharmacy **  What is oral/dental rinse?   It is a mouthwash. It is a way of cleaning the mouth with a germ killing solution before your surgery.  The solution contains chlorhexidine, commonly known as CHG.   It is used inside the mouth to kill a bacteria known as Staphylococcus aureus.  Let your doctor know if you are allergic to Chlorhexidine.    Why do I need to use CHG oral/dental rinse?  The CHG oral/dental rinse helps to kill a bacteria in your mouth known a Staphylococcus aureus.     This reduces the risk of infection at the surgical site.      Using your CHG oral/dental rinse  STEPS:  Use your CHG oral/dental rinse after you brush your teeth the night before (at bedtime) and the morning of your surgery.  Follow all directions on your prescription label.    Use the cap on the container to measure 15ml (fill cap to fill line)  Swish (gargle if you can) the mouthwash in your mouth for at least 30 seconds, (do  not to swallow) spit out  After you use your CHG rinse, do not rinse your mouth with water, drink or eat.  Please refer to prescription label for the appropriate time to resume oral intake  Dental rinse comes in one size bottle: 473ml ~16oz.  You will have leftover    rinse, discard after this use.    What side effects might I have using the CHG oral/dental rinse?  CHG rinse will stick to plaque on the teeth.  Brush and floss just before use.  Teeth brushing will help avoid staining of plaque during use.    Who should I contact if I have questions about the CHG oral/dental rinse?  Please call [facility], Preadmission Testing at [phone] if you have any questions     Home Preoperative Antibacterial Shower     What is a home preoperative antibacterial shower?  This shower is a way of cleaning the skin with a germ killing soap before surgery.  The soap contains chlorhexidine, commonly known as CHG.  CHG is a soap for your skin with germ killing ability.  Let your doctor know if you are allergic to chlorhexidine.    Why do I need to take a preoperative antibacterial shower?  Skin is not sterile.  It is best to try to make your skin as free of germs as possible before surgery.  Proper cleansing with a germ killing soap before surgery can lower the number of germs on your skin.  This helps to reduce the risk of infection at the surgical site.  Following the instructions listed below will help you prepare your skin for surgery.      How do I use the CHG skin cleanser?  Steps:  Begin using your CHG soap five days before your scheduled surgery on ________________________.    Days 1-4 Shower before bed:  Wash your face and genitals with your normal soap and rinse.    Wash and rinse your hair using the CHG soap. Rinse completely, do not condition your   Hair.          3.    Apply the CHG soap to a clean wet washcloth.  Turn the water off or move away                From the water spray to  avoid premature rinsing of the CHG soap as you are applying.     4.   Lather your entire body from the neck down.  Do not use on your face or genitals.   Pay special attention to the area(s) where your incision(s) will be located unless they are on your face.  Avoid scrubbing your skin too hard.  The important point is to have the CHG soap sit on your skin for 3 minutes.    When the 3 minutes are up, turn on the water and rinse the CHG soap off your body completely.   Pat yourself dry with a clean, freshly-laundered towel.  Dress in clean, freshly laundered night clothes.    Be sure to sleep with clean, freshly laundered sheets.  Day 5:  Last shower is the morning before surgery: Follow above Instructions.    NOTE:    *Hair extensions should be removed    *Keep CHG soap out of eyes and ear canals   *DO NOT wash with regular soap on your body after you have used the CHG        soap solution  *DO NOT apply powders, lotions, or perfume.  *Deodorant may be used days 1-4, BUT NOT the day of surgery    Who should I contact if I have any questions regarding the use of CHG soap?  Call the [facility], Preadmission Testing at [phone] if you have any questions.              Patient Information: Pre-Operative Infection Prevention Measures     Why did I have my nose, under my arms and groin swabbed?  The purpose of the swab is to identify Staphylococcus aureus inside your nose or on your skin.  The swab was sent to the laboratory for culture.  A positive swab/culture for Staphylococcus aureus is called colonization or carriage.      What is Staphylococcus aureus?  Staphylococcus aureus, also known as “staph”, is a germ found on the skin or in the nose of healthy people.  Sometimes Staphylococcus aureus can get into the body and cause an infection.  This can be minor (such as pimples, boils or other skin problems).  It might also be serious (such as blood infection, pneumonia or a surgical site  infection).    What is Staphylococcus aureus colonization or carriage?  Colonization or carriage means that a person has the germ but is not sick from it.  These bacteria can be spread on the hands or when breathing or sneezing.    How is Staphylococcus aureus spread?  It is most often spread by close contact with a person or item that carries it.    What happens if my culture is positive for Staphylococcus aureus?  Your doctor/medical team will use this information to guide any antibiotic treatment which may be necessary.  Regardless of the culture results, we will clean the inside of your nose with a betadine swab just before you have your surgery.      Will I get an infection if I have Staphylococcus aureus in my nose or on my skin?  Anyone can get an infection with Staphylococcus aureus.  However, the best way to reduce your risk of infection is to follow the instructions provided to you for the use of your CHG soap and dental rinse.        Who should I contact if I have any questions?  Call the [facility], Preadmission Testing at [phone] if you have any questions.

## 2024-07-16 NOTE — CPM/PAT H&P
"[facility]/PAT Evaluation       Name: Zenaida Morales (Zenaida Morales)  /Age: 1965/59 y.o.       Date of Consult: 24     Referring Provider: Dr. Atwood    Surgery, Date, and Length: Robot Assisted Sacrocolpopexy; Posterior Repair; Possible Perineorrhahpy  Cystoscopy , 24, 240MIN    Zenaida Morales is a 59 year-old female who presents to the [facility] for perioperative risk assessment prior to surgery.    Patient presents with a primary diagnosis of vaginal vault prolapse and MILY. Pt first noted vaginal bulge in early 2024. She notes associated pelvic pain, \"like a tampon was stuck\".  Bulge becomes larger with BM and voiding. She is able to reduce the bulge, but admits to irritation of the tissue due to repeated reduction of the bulge.  She has not tried pessary placement due to intense tenderness of pelvic floor.  Pt denies dysuria, f/c/n/v or gross hematuria.  She does have urine leakage with coughing, sneezing, laughing. No urgency.  Pt wishes to proceed with surgery as planned.     This note was created in part upon personal review of patient's medical records.      Patient is scheduled to have Robot Assisted Sacrocolpopexy; Posterior Repair; Possible Perineorrhahpy  Cystoscopy     Pt admits to PONV with anesthesia in the past - at time of hysterectomy about 15 years ago.  Her PONV lasted 2+ weeks.   Pt denies any past history of anesthetic complications such as PONV, awareness, prolonged sedation, dental damage, aspiration, cardiac arrest, difficult intubation, difficult I.V. access or unexpected hospital admissions.  NO malignant hyperthermia and or pseudocholinesterase deficiency.  No history of blood transfusions     The patient is not a Hinduism and will accept blood and blood products if medically indicated.   Type and screen sent.     Past Medical History:   Diagnosis Date    ADHD     Asthma (HHS-HCC)     GERD (gastroesophageal reflux disease)     H/O echocardiogram 2023    Scanned " to media (no evidence of dilated cardiomyopathy)    H/O right heart catheterization 02/25/2015    Hyperlipidemia     Hypertension     Lumbago with sciatica     Lumbar spondylosis     Obesity     Orthopnea     Panic disorder     PONV (postoperative nausea and vomiting)     Sleep apnea     unable to tolerate CPAP    Type 2 diabetes mellitus (Multi)     Urge incontinence of urine     Vaginal vault prolapse        Past Surgical History:   Procedure Laterality Date    CARDIAC CATHETERIZATION Right 02/25/2015    COLONOSCOPY      HYSTERECTOMY      UPPER GASTROINTESTINAL ENDOSCOPY         Patient Sexual activity questions deferred to the physician.    Family History   Problem Relation Name Age of Onset    Asthma Mother      Heart attack Father          s/p CABG    Lung cancer Father      Heart failure Brother          pacemaker     Social History     Socioeconomic History    Marital status: Unknown     Spouse name: Not on file    Number of children: Not on file    Years of education: Not on file    Highest education level: Not on file   Occupational History    Not on file   Tobacco Use    Smoking status: Never    Smokeless tobacco: Never   Vaping Use    Vaping status: Never Used   Substance and Sexual Activity    Alcohol use: Yes     Alcohol/week: 2.0 standard drinks of alcohol     Types: 2 Standard drinks or equivalent per week    Drug use: Never    Sexual activity: Defer   Other Topics Concern    Not on file   Social History Narrative    Not on file     Social Determinants of Health     Financial Resource Strain: Not on file   Food Insecurity: Not on file   Transportation Needs: Not on file   Physical Activity: Not on file   Stress: Not on file   Social Connections: Not on file   Intimate Partner Violence: Not on file   Housing Stability: Not on file        Allergies   Allergen Reactions    Cefdinir Anaphylaxis    Penicillins Anaphylaxis    Sulfa-Gyn Anaphylaxis    Codeine Seizure    Minocycline Hives       Current  Outpatient Medications:     acetaminophen (Tylenol) 325 mg tablet, Take 1 tablet (325 mg) by mouth every 6 hours if needed for mild pain (1 - 3)., Disp: , Rfl:     atorvastatin (Lipitor) 40 mg tablet, Take 1 tablet (40 mg) by mouth once daily., Disp: , Rfl:     carvedilol (Coreg) 6.25 mg tablet, Take 1 tablet (6.25 mg) by mouth 2 times a day., Disp: , Rfl:     diclofenac sodium (Voltaren) 1 % gel, Apply 4.5 inches (4 g) topically 4 times a day as needed (arthritis pain)., Disp: , Rfl:     fexofenadine (Allegra) 180 mg tablet, Take 1 tablet (180 mg) by mouth every other day., Disp: , Rfl:     furosemide (Lasix) 40 mg tablet, Take 1 tablet (40 mg) by mouth if needed (swelling)., Disp: , Rfl:     Jardiance 25 mg, Take 1 tablet (25 mg) by mouth once daily., Disp: , Rfl:     loratadine (Claritin) 10 mg tablet, Take 1 tablet (10 mg) by mouth every other day., Disp: , Rfl:     methylphenidate (Ritalin) 10 mg tablet, Take 1 tablet (10 mg) by mouth if needed., Disp: , Rfl:     pantoprazole (ProtoNix) 40 mg EC tablet, Take 1 tablet (40 mg) by mouth once daily in the morning. Take before meals. Do not crush, chew, or split., Disp: , Rfl:     potassium chloride (Klor-Con) 20 mEq packet, Take 20 mEq by mouth if needed (Takes w/ lasix)., Disp: , Rfl:     valsartan (Diovan) 80 mg tablet, Take 1 tablet (80 mg) by mouth once daily., Disp: , Rfl:     chlorhexidine (Peridex) 0.12 % solution, Swish for 30 seconds and spit 15mL of solution the night before and morning of surgery, Disp: 475 mL, Rfl: 0         PAT ROS:   Constitutional:    no fever   no chills   no unexpected weight change  Neuro/Psych:    no numbness   no weakness   no light-headedness   no confusion  Eyes:    no discharge   no pain   no vision loss   no diplopia   no visual disturbance   use of corrective lenses  Ears:    no ear pain   no hearing loss   no tinnitus  Nose:    no nasal discharge   no sinus congestion   no epistaxis  Mouth:    no dental issues   no mouth  pain   no oral bleeding   no mouth lesions  Throat:    no throat pain   no dysphagia  Neck:    no neck pain   no neck stiffness  Cardio:    Functional 4 Mets. Patient denies SOB walking up 2 flights of stairs   Walking 30 minutes a few times a week; cares for mom; cooking, cleaning, grocery shopping   no chest pain   no palpitations   no peripheral edema   no dyspnea   no GALINDO  Respiratory:    no cough   no wheezing   no hemoptysis   no shortness of breath  Endocrine:    no cold intolerance   no heat intolerance  GI:    no abdominal distention   no abdominal pain   no constipation   no diarrhea   no nausea   no vomiting   no blood in stool  :    no difficulty urinating   no dysuria   no oliguria   vaginal issues (prolapse)  Musculoskeletal:    arthralgias (b/l hands)   no myalgias   no decreased ROM  Hematologic:    does not bruise/bleed easily   no excessive bleeding   no history of blood transfusion   no blood clots  Skin:   no skin changes   no sores/wound   no rash      Physical Exam  Constitutional:       General: She is not in acute distress.     Appearance: Normal appearance. She is not ill-appearing, toxic-appearing or diaphoretic.   HENT:      Head: Normocephalic and atraumatic.      Nose: Nose normal. No rhinorrhea.      Mouth/Throat:      Pharynx: No oropharyngeal exudate.   Eyes:      Extraocular Movements: Extraocular movements intact.      Conjunctiva/sclera: Conjunctivae normal.   Cardiovascular:      Rate and Rhythm: Normal rate and regular rhythm.      Heart sounds: No murmur heard.     No friction rub. No gallop.      Comments: Functional 4 Mets. Patient denies SOB walking up 2 flights of stairs     Pulmonary:      Effort: Pulmonary effort is normal. No respiratory distress.      Breath sounds: Normal breath sounds. No stridor. No wheezing or rhonchi.   Abdominal:      General: Bowel sounds are normal. There is no distension.      Palpations: Abdomen is soft. There is no mass.      Tenderness:  "There is no abdominal tenderness. There is no guarding or rebound.      Hernia: No hernia is present.   Musculoskeletal:         General: No swelling, tenderness, deformity or signs of injury. Normal range of motion.      Cervical back: Normal range of motion and neck supple. No rigidity or tenderness.   Skin:     General: Skin is warm and dry.      Coloration: Skin is not jaundiced or pale.      Findings: No bruising, erythema, lesion or rash.   Neurological:      General: No focal deficit present.      Mental Status: She is alert and oriented to person, place, and time.      Cranial Nerves: No cranial nerve deficit.      Sensory: No sensory deficit.      Motor: No weakness.      Coordination: Coordination normal.   Psychiatric:         Mood and Affect: Mood normal.         Behavior: Behavior normal.          PAT AIRWAY:   Airway:     Mallampati::  II    Neck ROM::  Full   No broken teeth, no dentures and no missing teeth          Visit Vitals  /67   Pulse 84   Temp 36.8 °C (98.3 °F)   Resp 16   Ht 1.676 m (5' 6\")   Wt 83.8 kg (184 lb 11.9 oz)   SpO2 94%   BMI 29.82 kg/m²   Smoking Status Never   BSA 1.98 m²      LABS:  Lab Results   Component Value Date    WBC 6.7 07/16/2024    HGB 14.0 07/16/2024    HCT 43.6 07/16/2024    MCV 93 07/16/2024     07/16/2024     Lab Results   Component Value Date    GLUCOSE 126 (H) 07/16/2024    CALCIUM 9.2 07/16/2024     07/16/2024    K 4.5 07/16/2024    CO2 28 07/16/2024     07/16/2024    BUN 16 07/16/2024    CREATININE 0.57 07/16/2024       Lab Results   Component Value Date    HGBA1C 6.2 (H) 07/16/2024        Contains abnormal data Urinalysis with Reflex Culture and Microscopic  Order: [id number] - Part of Panel Order [id number]   Status: Final result       Visible to patient: Yes (not seen)       Dx: Preop testing    0 Result Notes      Component  Ref Range & Units 09:28   Color, Urine  Light-Yellow, Yellow, Dark-Yellow Light-Yellow   Appearance, " Urine  Clear Clear   Specific Gravity, Urine  1.005 - 1.035 1.017   pH, Urine  5.0, 5.5, 6.0, 6.5, 7.0, 7.5, 8.0 5.5   Protein, Urine  NEGATIVE, 10 (TRACE), 20 (TRACE) mg/dL NEGATIVE   Glucose, Urine  Normal mg/dL OVER (4+) Abnormal    Blood, Urine  NEGATIVE NEGATIVE   Ketones, Urine  NEGATIVE mg/dL NEGATIVE   Bilirubin, Urine  NEGATIVE NEGATIVE   Urobilinogen, Urine  Normal mg/dL Normal   Nitrite, Urine  NEGATIVE NEGATIVE   Leukocyte Esterase, Urine  NEGATIVE NEGATIVE   Resulting Agency [facility]              Specimen Collected: 07/16/24 09:28           EKG 7/16/24  Sinus rhythm with 1st degree AV block with fusion complexes  Left axis deviation  Pulmonary disease pattern  Septal infarct, age undetermined  Abnormal EKG  Vent rate = 86 bpm    ECHO 12/19/23  Conclusion:  EF 70%  LV normal in size  Mild asymmetric hypertrophy of interventricular septum  No evidence of dilated cardiomyopathy  LVEF is normal ehter is no left ventricle outflow tract (LVOT) gradient at rest  RVSP = 14mmHg    ECHO 2/8/18  CONCLUSIONS:   - Exam indication: Routine surveillance (>1yr) of Heart Failure with no change in   clinical status     - The left ventricle is normal in size. Left ventricular systolic function is   normal. EF = 62 ± 5% (2D biplane)   - The right ventricle is normal in size. Right ventricular systolic function is   normal.   - No significant valvular abnormalities.   - Exam was compared with the prior  echocardiographic exam performed on   12/12/2016. No significant change.     Assessment and Plan:     Patient is a 59-year-old female scheduled for a Robot Assisted Sacrocolpopexy; Posterior Repair; Possible Perineorrhahpy  Cystoscopy  with Dr. Atwood on 7/19/24.    Patient has no active cardiac symptoms.   Patient denies any chest pain, tightness, heaviness, pressure, radiating pain, palpitations, irregular heartbeats, lightheadedness, cough, congestion, shortness of breath, GALINDO, PND, near syncope, weight loss or gain.      RCRI  0  , 3.9 % Risk of MACE    Cardiac:  HTN - Valsartan HOLD evening prior to surgery and morning of surgery   ; hold furosemide dos; cont carvedilol on dos   Encouraged lifestyle modifications, low-sodium diet, and increase activity as tolerated.  Monitor BP and follow up with managing physician for readings sustaining >140/90.    HLD - cont statin on dos    CHF - cont furosemide on dos ; last ECHO [id number] (within 1 year)    NICM - last ECHO 2023    Pt followed with Dr. Nuria Bhardwaj MD of [facility] in the past with last visit 2018. She was then following with Dr. Irwin Dutta in [place]; with last visit in 2022. Most recent cardiologist unfortunately passed away.  She is now followed by PCP, Dr. Morrissey for these issues. Up to date on ECHO (12/2023). Great functional capacity.  No cardiac symptoms.  Cardiac consult deferred at this time.     Pulmonary:  Asthma - cont inhalers as prescribed, including dos if needed    PARUL - Known and treated  PARUL- Patient was instructed to bring CPAP machine the morning of surgery. Recommend prioritizing  nonopioid analgesic techniques (regional and local anesthesia, nonsteroidal medications, etc) before the administration of opioids and close monitoring for hypoventilation after surgery due to PARUL. Increased vigilance is recommended with the use of narcotics due to an increased risk for opioid induced respiratory depression     Endocrine:  DMII - hold Jardiance 3 days prior to dos  No A1c available    Hematology:  Patient instructed to ambulate as soon as possible postoperatively to decrease thromboembolic risk.   Initiate mechanical DVT prophylaxis as soon as possible and initiate chemical prophylaxis when deemed safe from a bleeding standpoint post surgery.     LABS: CBC, BMP, T&S, MRSA, A1c, UA flex and EKG ordered. Results reviewed and show glucosuria and elevated nonfasting blood glucose of 126mg/dL.      Followup: MRSA  pending    STOP BANG: >50, HTN - 2    Caprini:  3    Risk assessment complete.  Patient is scheduled for a intermediate surgical risk procedure.        Preoperative medication instructions were provided and reviewed with the patient.  Any additional testing or evaluation was explained to the patient.  Nothing by mouth instructions were discussed and patient's questions were answered prior to conclusion to this encounter.  Patient verbalized understanding of preoperative instructions given in preadmission testing; discharge instructions available in EMR.    This note was dictated by a speech recognition.  Minor errors may have been detected in a speech recognition.

## 2024-07-17 NOTE — HOSPITAL COURSE
58 yo with MILY & apical and anterior vaginal prolapse, here for robotic sacrocolpopexy, posterior repair, possible perineorrhahpy, and cystoscopy    UDS with MILY and ISD    POP-Q measurements were:      Aa: +3, Ba: +3, C: -2     gH: 3, pB: 3, TVL: 8     Ap: 0, Bp: 0 D: -4    PMH: ADHD, asthma, GERD, HLD, HTN, sciatica, lumbar spondylosis, panic disorder, PARUL, T2DM, 1st degree AV block, CHF with EF 62%  PSH: laparoscopic supracervical hysterectomy, s/p R cardiac catheterization in 2015  OBGYN:  Meds: lipitor, Coreg, Voltaren, Allegra, Lasic, Jardiance, Claritin, Ritalin, Protonix, K cholirde, Valsartan,   all: Cefdinir, PCN, sulfa drugs, codeine, minocycline  Soc: 2 drinks of ETOH/wk, no tobacco or drug use  Fhx: noncontributory    Lab Results   Component Value Date    WBC 6.7 07/16/2024    HGB 14.0 07/16/2024    HCT 43.6 07/16/2024    MCV 93 07/16/2024     07/16/2024     Lab Results   Component Value Date    GLUCOSE 126 (H) 07/16/2024    CALCIUM 9.2 07/16/2024     07/16/2024    K 4.5 07/16/2024    CO2 28 07/16/2024     07/16/2024    BUN 16 07/16/2024    CREATININE 0.57 07/16/2024

## 2024-07-18 LAB — STAPHYLOCOCCUS SPEC CULT: ABNORMAL

## 2024-07-19 ENCOUNTER — HOSPITAL ENCOUNTER (OUTPATIENT)
Facility: HOSPITAL | Age: 59
Discharge: HOME | End: 2024-07-20
Attending: OBSTETRICS & GYNECOLOGY | Admitting: STUDENT IN AN ORGANIZED HEALTH CARE EDUCATION/TRAINING PROGRAM
Payer: COMMERCIAL

## 2024-07-19 ENCOUNTER — ANESTHESIA (OUTPATIENT)
Dept: OPERATING ROOM | Facility: HOSPITAL | Age: 59
End: 2024-07-19
Payer: COMMERCIAL

## 2024-07-19 ENCOUNTER — ANESTHESIA EVENT (OUTPATIENT)
Dept: OPERATING ROOM | Facility: HOSPITAL | Age: 59
End: 2024-07-19
Payer: COMMERCIAL

## 2024-07-19 DIAGNOSIS — N39.3 FEMALE STRESS INCONTINENCE: ICD-10-CM

## 2024-07-19 DIAGNOSIS — N81.9 VAGINAL VAULT PROLAPSE: ICD-10-CM

## 2024-07-19 DIAGNOSIS — G89.18 POST-OP PAIN: ICD-10-CM

## 2024-07-19 DIAGNOSIS — R52 POSTPARTUM PAIN (HHS-HCC): Primary | ICD-10-CM

## 2024-07-19 DIAGNOSIS — Z87.898 HISTORY OF POSTOPERATIVE NAUSEA: ICD-10-CM

## 2024-07-19 PROBLEM — Z90.710 STATUS POST HYSTERECTOMY: Status: ACTIVE | Noted: 2024-07-19

## 2024-07-19 LAB
ABO GROUP (TYPE) IN BLOOD: NORMAL
GLUCOSE BLD MANUAL STRIP-MCNC: 107 MG/DL (ref 74–99)
GLUCOSE BLD MANUAL STRIP-MCNC: 140 MG/DL (ref 74–99)
RH FACTOR (ANTIGEN D): NORMAL

## 2024-07-19 PROCEDURE — 2500000004 HC RX 250 GENERAL PHARMACY W/ HCPCS (ALT 636 FOR OP/ED): Performed by: NURSE ANESTHETIST, CERTIFIED REGISTERED

## 2024-07-19 PROCEDURE — 88305 TISSUE EXAM BY PATHOLOGIST: CPT | Mod: TC,AHULAB | Performed by: OBSTETRICS & GYNECOLOGY

## 2024-07-19 PROCEDURE — 3600000009 HC OR TIME - EACH INCREMENTAL 1 MINUTE - PROCEDURE LEVEL FOUR: Performed by: OBSTETRICS & GYNECOLOGY

## 2024-07-19 PROCEDURE — 2500000004 HC RX 250 GENERAL PHARMACY W/ HCPCS (ALT 636 FOR OP/ED): Performed by: ANESTHESIOLOGY

## 2024-07-19 PROCEDURE — 57288 REPAIR BLADDER DEFECT: CPT | Performed by: OBSTETRICS & GYNECOLOGY

## 2024-07-19 PROCEDURE — 2500000004 HC RX 250 GENERAL PHARMACY W/ HCPCS (ALT 636 FOR OP/ED): Performed by: OBSTETRICS & GYNECOLOGY

## 2024-07-19 PROCEDURE — 2500000005 HC RX 250 GENERAL PHARMACY W/O HCPCS: Performed by: OBSTETRICS & GYNECOLOGY

## 2024-07-19 PROCEDURE — C1771 REP DEV, URINARY, W/SLING: HCPCS | Performed by: OBSTETRICS & GYNECOLOGY

## 2024-07-19 PROCEDURE — 96372 THER/PROPH/DIAG INJ SC/IM: CPT | Performed by: STUDENT IN AN ORGANIZED HEALTH CARE EDUCATION/TRAINING PROGRAM

## 2024-07-19 PROCEDURE — 7100000002 HC RECOVERY ROOM TIME - EACH INCREMENTAL 1 MINUTE: Performed by: OBSTETRICS & GYNECOLOGY

## 2024-07-19 PROCEDURE — 88305 TISSUE EXAM BY PATHOLOGIST: CPT | Performed by: PATHOLOGY

## 2024-07-19 PROCEDURE — 2720000007 HC OR 272 NO HCPCS: Performed by: OBSTETRICS & GYNECOLOGY

## 2024-07-19 PROCEDURE — 7100000001 HC RECOVERY ROOM TIME - INITIAL BASE CHARGE: Performed by: OBSTETRICS & GYNECOLOGY

## 2024-07-19 PROCEDURE — G0378 HOSPITAL OBSERVATION PER HR: HCPCS

## 2024-07-19 PROCEDURE — C1781 MESH (IMPLANTABLE): HCPCS | Performed by: OBSTETRICS & GYNECOLOGY

## 2024-07-19 PROCEDURE — 57425 LAPAROSCOPY SURG COLPOPEXY: CPT | Performed by: OBSTETRICS & GYNECOLOGY

## 2024-07-19 PROCEDURE — 2500000005 HC RX 250 GENERAL PHARMACY W/O HCPCS: Performed by: NURSE ANESTHETIST, CERTIFIED REGISTERED

## 2024-07-19 PROCEDURE — 3700000001 HC GENERAL ANESTHESIA TIME - INITIAL BASE CHARGE: Performed by: OBSTETRICS & GYNECOLOGY

## 2024-07-19 PROCEDURE — 82947 ASSAY GLUCOSE BLOOD QUANT: CPT

## 2024-07-19 PROCEDURE — 2500000005 HC RX 250 GENERAL PHARMACY W/O HCPCS: Performed by: ANESTHESIOLOGY

## 2024-07-19 PROCEDURE — 2500000005 HC RX 250 GENERAL PHARMACY W/O HCPCS: Performed by: ANESTHESIOLOGIST ASSISTANT

## 2024-07-19 PROCEDURE — 2780000003 HC OR 278 NO HCPCS: Performed by: OBSTETRICS & GYNECOLOGY

## 2024-07-19 PROCEDURE — 2500000001 HC RX 250 WO HCPCS SELF ADMINISTERED DRUGS (ALT 637 FOR MEDICARE OP): Performed by: STUDENT IN AN ORGANIZED HEALTH CARE EDUCATION/TRAINING PROGRAM

## 2024-07-19 PROCEDURE — 3700000002 HC GENERAL ANESTHESIA TIME - EACH INCREMENTAL 1 MINUTE: Performed by: OBSTETRICS & GYNECOLOGY

## 2024-07-19 PROCEDURE — 57250 REPAIR RECTUM & VAGINA: CPT | Performed by: OBSTETRICS & GYNECOLOGY

## 2024-07-19 PROCEDURE — 2500000004 HC RX 250 GENERAL PHARMACY W/ HCPCS (ALT 636 FOR OP/ED): Performed by: STUDENT IN AN ORGANIZED HEALTH CARE EDUCATION/TRAINING PROGRAM

## 2024-07-19 PROCEDURE — 2500000004 HC RX 250 GENERAL PHARMACY W/ HCPCS (ALT 636 FOR OP/ED): Performed by: ANESTHESIOLOGIST ASSISTANT

## 2024-07-19 PROCEDURE — 2500000001 HC RX 250 WO HCPCS SELF ADMINISTERED DRUGS (ALT 637 FOR MEDICARE OP): Performed by: OBSTETRICS & GYNECOLOGY

## 2024-07-19 PROCEDURE — 3600000004 HC OR TIME - INITIAL BASE CHARGE - PROCEDURE LEVEL FOUR: Performed by: OBSTETRICS & GYNECOLOGY

## 2024-07-19 PROCEDURE — 36415 COLL VENOUS BLD VENIPUNCTURE: CPT | Performed by: OBSTETRICS & GYNECOLOGY

## 2024-07-19 DEVICE — POLYPROPYLENE MESH FOR SACROCOLPOSUSPENSION/SACROCOLPOPEXY - Y
Type: IMPLANTABLE DEVICE | Site: PELVIS | Status: FUNCTIONAL
Brand: RESTORELLE

## 2024-07-19 DEVICE — IMPLANTABLE DEVICE: Type: IMPLANTABLE DEVICE | Site: PELVIS | Status: FUNCTIONAL

## 2024-07-19 RX ORDER — PHENAZOPYRIDINE HYDROCHLORIDE 100 MG/1
200 TABLET, FILM COATED ORAL ONCE
Status: COMPLETED | OUTPATIENT
Start: 2024-07-19 | End: 2024-07-19

## 2024-07-19 RX ORDER — KETOROLAC TROMETHAMINE 30 MG/ML
INJECTION, SOLUTION INTRAMUSCULAR; INTRAVENOUS AS NEEDED
Status: DISCONTINUED | OUTPATIENT
Start: 2024-07-19 | End: 2024-07-19

## 2024-07-19 RX ORDER — ACETAMINOPHEN 325 MG/1
975 TABLET ORAL EVERY 6 HOURS
Status: DISCONTINUED | OUTPATIENT
Start: 2024-07-19 | End: 2024-07-20 | Stop reason: HOSPADM

## 2024-07-19 RX ORDER — SODIUM CHLORIDE, SODIUM LACTATE, POTASSIUM CHLORIDE, CALCIUM CHLORIDE 600; 310; 30; 20 MG/100ML; MG/100ML; MG/100ML; MG/100ML
INJECTION, SOLUTION INTRAVENOUS CONTINUOUS PRN
Status: DISCONTINUED | OUTPATIENT
Start: 2024-07-19 | End: 2024-07-19

## 2024-07-19 RX ORDER — CELECOXIB 200 MG/1
400 CAPSULE ORAL ONCE
Status: COMPLETED | OUTPATIENT
Start: 2024-07-19 | End: 2024-07-19

## 2024-07-19 RX ORDER — VALSARTAN 80 MG/1
80 TABLET ORAL DAILY
Status: DISCONTINUED | OUTPATIENT
Start: 2024-07-20 | End: 2024-07-20 | Stop reason: HOSPADM

## 2024-07-19 RX ORDER — PROPOFOL 10 MG/ML
INJECTION, EMULSION INTRAVENOUS AS NEEDED
Status: DISCONTINUED | OUTPATIENT
Start: 2024-07-19 | End: 2024-07-19

## 2024-07-19 RX ORDER — FENTANYL CITRATE 50 UG/ML
12.5 INJECTION, SOLUTION INTRAMUSCULAR; INTRAVENOUS EVERY 5 MIN PRN
Status: DISCONTINUED | OUTPATIENT
Start: 2024-07-19 | End: 2024-07-19 | Stop reason: HOSPADM

## 2024-07-19 RX ORDER — SIMETHICONE 80 MG
80 TABLET,CHEWABLE ORAL 4 TIMES DAILY PRN
Status: DISCONTINUED | OUTPATIENT
Start: 2024-07-19 | End: 2024-07-20 | Stop reason: HOSPADM

## 2024-07-19 RX ORDER — TRAMADOL HYDROCHLORIDE 50 MG/1
100 TABLET ORAL EVERY 6 HOURS PRN
Status: DISCONTINUED | OUTPATIENT
Start: 2024-07-19 | End: 2024-07-20 | Stop reason: HOSPADM

## 2024-07-19 RX ORDER — TRAMADOL HYDROCHLORIDE 50 MG/1
50 TABLET ORAL EVERY 6 HOURS PRN
Status: DISCONTINUED | OUTPATIENT
Start: 2024-07-19 | End: 2024-07-20 | Stop reason: HOSPADM

## 2024-07-19 RX ORDER — LIDOCAINE HYDROCHLORIDE AND EPINEPHRINE 10; 10 MG/ML; UG/ML
INJECTION, SOLUTION INFILTRATION; PERINEURAL AS NEEDED
Status: DISCONTINUED | OUTPATIENT
Start: 2024-07-19 | End: 2024-07-19 | Stop reason: HOSPADM

## 2024-07-19 RX ORDER — IBUPROFEN 600 MG/1
600 TABLET ORAL EVERY 6 HOURS
Status: DISCONTINUED | OUTPATIENT
Start: 2024-07-21 | End: 2024-07-20 | Stop reason: HOSPADM

## 2024-07-19 RX ORDER — SCOLOPAMINE TRANSDERMAL SYSTEM 1 MG/1
1 PATCH, EXTENDED RELEASE TRANSDERMAL
Status: DISCONTINUED | OUTPATIENT
Start: 2024-07-19 | End: 2024-07-19

## 2024-07-19 RX ORDER — PHENYLEPHRINE HCL IN 0.9% NACL 1 MG/10 ML
SYRINGE (ML) INTRAVENOUS AS NEEDED
Status: DISCONTINUED | OUTPATIENT
Start: 2024-07-19 | End: 2024-07-19

## 2024-07-19 RX ORDER — ONDANSETRON 4 MG/1
4 TABLET, ORALLY DISINTEGRATING ORAL EVERY 8 HOURS PRN
Qty: 42 TABLET | Refills: 0 | Status: SHIPPED | OUTPATIENT
Start: 2024-07-19 | End: 2024-08-02

## 2024-07-19 RX ORDER — GENTAMICIN 40 MG/ML
INJECTION, SOLUTION INTRAMUSCULAR; INTRAVENOUS AS NEEDED
Status: DISCONTINUED | OUTPATIENT
Start: 2024-07-19 | End: 2024-07-19

## 2024-07-19 RX ORDER — NORETHINDRONE AND ETHINYL ESTRADIOL 0.5-0.035
KIT ORAL AS NEEDED
Status: DISCONTINUED | OUTPATIENT
Start: 2024-07-19 | End: 2024-07-19

## 2024-07-19 RX ORDER — LIDOCAINE HYDROCHLORIDE 20 MG/ML
INJECTION, SOLUTION EPIDURAL; INFILTRATION; INTRACAUDAL; PERINEURAL AS NEEDED
Status: DISCONTINUED | OUTPATIENT
Start: 2024-07-19 | End: 2024-07-19

## 2024-07-19 RX ORDER — ROCURONIUM BROMIDE 10 MG/ML
INJECTION, SOLUTION INTRAVENOUS AS NEEDED
Status: DISCONTINUED | OUTPATIENT
Start: 2024-07-19 | End: 2024-07-19

## 2024-07-19 RX ORDER — FENTANYL CITRATE 50 UG/ML
INJECTION, SOLUTION INTRAMUSCULAR; INTRAVENOUS AS NEEDED
Status: DISCONTINUED | OUTPATIENT
Start: 2024-07-19 | End: 2024-07-19

## 2024-07-19 RX ORDER — TRAMADOL HYDROCHLORIDE 50 MG/1
50 TABLET ORAL EVERY 6 HOURS PRN
Qty: 15 TABLET | Refills: 0 | Status: SHIPPED | OUTPATIENT
Start: 2024-07-19

## 2024-07-19 RX ORDER — ACETAMINOPHEN 325 MG/1
975 TABLET ORAL ONCE
Status: DISCONTINUED | OUTPATIENT
Start: 2024-07-19 | End: 2024-07-19 | Stop reason: HOSPADM

## 2024-07-19 RX ORDER — FENTANYL CITRATE 50 UG/ML
25 INJECTION, SOLUTION INTRAMUSCULAR; INTRAVENOUS EVERY 5 MIN PRN
Status: DISCONTINUED | OUTPATIENT
Start: 2024-07-19 | End: 2024-07-19 | Stop reason: HOSPADM

## 2024-07-19 RX ORDER — POLYETHYLENE GLYCOL 3350 17 G/17G
17 POWDER, FOR SOLUTION ORAL DAILY
Status: DISCONTINUED | OUTPATIENT
Start: 2024-07-20 | End: 2024-07-20 | Stop reason: HOSPADM

## 2024-07-19 RX ORDER — DROPERIDOL 2.5 MG/ML
0.62 INJECTION, SOLUTION INTRAMUSCULAR; INTRAVENOUS ONCE AS NEEDED
Status: COMPLETED | OUTPATIENT
Start: 2024-07-19 | End: 2024-07-19

## 2024-07-19 RX ORDER — LIDOCAINE HYDROCHLORIDE 10 MG/ML
0.1 INJECTION, SOLUTION EPIDURAL; INFILTRATION; INTRACAUDAL; PERINEURAL ONCE
Status: DISCONTINUED | OUTPATIENT
Start: 2024-07-19 | End: 2024-07-19 | Stop reason: HOSPADM

## 2024-07-19 RX ORDER — MIDAZOLAM HYDROCHLORIDE 1 MG/ML
INJECTION INTRAMUSCULAR; INTRAVENOUS AS NEEDED
Status: DISCONTINUED | OUTPATIENT
Start: 2024-07-19 | End: 2024-07-19

## 2024-07-19 RX ORDER — HEPARIN SODIUM 5000 [USP'U]/ML
5000 INJECTION, SOLUTION INTRAVENOUS; SUBCUTANEOUS EVERY 8 HOURS
Status: DISCONTINUED | OUTPATIENT
Start: 2024-07-19 | End: 2024-07-20 | Stop reason: HOSPADM

## 2024-07-19 RX ORDER — FENTANYL CITRATE 50 UG/ML
50 INJECTION, SOLUTION INTRAMUSCULAR; INTRAVENOUS EVERY 5 MIN PRN
Status: DISCONTINUED | OUTPATIENT
Start: 2024-07-19 | End: 2024-07-19 | Stop reason: HOSPADM

## 2024-07-19 RX ORDER — ACETAMINOPHEN 325 MG/1
650 TABLET ORAL EVERY 6 HOURS PRN
Qty: 112 TABLET | Refills: 0 | OUTPATIENT
Start: 2024-07-19 | End: 2024-07-25

## 2024-07-19 RX ORDER — SCOLOPAMINE TRANSDERMAL SYSTEM 1 MG/1
PATCH, EXTENDED RELEASE TRANSDERMAL AS NEEDED
Status: DISCONTINUED | OUTPATIENT
Start: 2024-07-19 | End: 2024-07-19

## 2024-07-19 RX ORDER — ONDANSETRON HYDROCHLORIDE 2 MG/ML
4 INJECTION, SOLUTION INTRAVENOUS ONCE AS NEEDED
Status: COMPLETED | OUTPATIENT
Start: 2024-07-19 | End: 2024-07-19

## 2024-07-19 RX ORDER — PANTOPRAZOLE SODIUM 40 MG/1
40 TABLET, DELAYED RELEASE ORAL
Status: DISCONTINUED | OUTPATIENT
Start: 2024-07-20 | End: 2024-07-20 | Stop reason: HOSPADM

## 2024-07-19 RX ORDER — CARVEDILOL 6.25 MG/1
6.25 TABLET ORAL 2 TIMES DAILY
Status: DISCONTINUED | OUTPATIENT
Start: 2024-07-19 | End: 2024-07-20 | Stop reason: HOSPADM

## 2024-07-19 RX ORDER — NALOXONE HYDROCHLORIDE 0.4 MG/ML
0.1 INJECTION, SOLUTION INTRAMUSCULAR; INTRAVENOUS; SUBCUTANEOUS EVERY 5 MIN PRN
Status: DISCONTINUED | OUTPATIENT
Start: 2024-07-19 | End: 2024-07-20 | Stop reason: HOSPADM

## 2024-07-19 RX ORDER — BUPIVACAINE HYDROCHLORIDE 2.5 MG/ML
INJECTION, SOLUTION INFILTRATION; PERINEURAL AS NEEDED
Status: DISCONTINUED | OUTPATIENT
Start: 2024-07-19 | End: 2024-07-19 | Stop reason: HOSPADM

## 2024-07-19 RX ORDER — IBUPROFEN 600 MG/1
600 TABLET ORAL EVERY 6 HOURS PRN
Qty: 56 TABLET | Refills: 0 | Status: SHIPPED | OUTPATIENT
Start: 2024-07-19 | End: 2024-08-02

## 2024-07-19 RX ORDER — SODIUM CHLORIDE, SODIUM LACTATE, POTASSIUM CHLORIDE, CALCIUM CHLORIDE 600; 310; 30; 20 MG/100ML; MG/100ML; MG/100ML; MG/100ML
100 INJECTION, SOLUTION INTRAVENOUS CONTINUOUS
Status: DISCONTINUED | OUTPATIENT
Start: 2024-07-19 | End: 2024-07-19

## 2024-07-19 RX ORDER — SODIUM CHLORIDE, SODIUM LACTATE, POTASSIUM CHLORIDE, CALCIUM CHLORIDE 600; 310; 30; 20 MG/100ML; MG/100ML; MG/100ML; MG/100ML
40 INJECTION, SOLUTION INTRAVENOUS CONTINUOUS
Status: DISCONTINUED | OUTPATIENT
Start: 2024-07-19 | End: 2024-07-20

## 2024-07-19 RX ORDER — ONDANSETRON HYDROCHLORIDE 2 MG/ML
4 INJECTION, SOLUTION INTRAVENOUS EVERY 6 HOURS PRN
Status: DISCONTINUED | OUTPATIENT
Start: 2024-07-19 | End: 2024-07-20 | Stop reason: HOSPADM

## 2024-07-19 RX ORDER — CLINDAMYCIN PHOSPHATE 900 MG/50ML
INJECTION, SOLUTION INTRAVENOUS AS NEEDED
Status: DISCONTINUED | OUTPATIENT
Start: 2024-07-19 | End: 2024-07-19

## 2024-07-19 RX ORDER — POLYETHYLENE GLYCOL 3350 17 G/17G
17 POWDER, FOR SOLUTION ORAL DAILY
Qty: 14 PACKET | Refills: 0 | Status: SHIPPED | OUTPATIENT
Start: 2024-07-19 | End: 2024-08-02

## 2024-07-19 RX ORDER — ONDANSETRON HYDROCHLORIDE 2 MG/ML
INJECTION, SOLUTION INTRAVENOUS AS NEEDED
Status: DISCONTINUED | OUTPATIENT
Start: 2024-07-19 | End: 2024-07-19

## 2024-07-19 RX ORDER — KETOROLAC TROMETHAMINE 30 MG/ML
30 INJECTION, SOLUTION INTRAMUSCULAR; INTRAVENOUS EVERY 6 HOURS
Status: DISCONTINUED | OUTPATIENT
Start: 2024-07-19 | End: 2024-07-20 | Stop reason: HOSPADM

## 2024-07-19 RX ORDER — GABAPENTIN 300 MG/1
600 CAPSULE ORAL ONCE
Status: COMPLETED | OUTPATIENT
Start: 2024-07-19 | End: 2024-07-19

## 2024-07-19 RX ADMIN — Medication 300 MCG: at 12:06

## 2024-07-19 RX ADMIN — CELECOXIB 400 MG: 200 CAPSULE ORAL at 10:24

## 2024-07-19 RX ADMIN — Medication 8 L/MIN: at 15:17

## 2024-07-19 RX ADMIN — PHENAZOPYRIDINE 200 MG: 100 TABLET ORAL at 10:24

## 2024-07-19 RX ADMIN — FENTANYL CITRATE 100 MCG: 50 INJECTION, SOLUTION INTRAMUSCULAR; INTRAVENOUS at 11:46

## 2024-07-19 RX ADMIN — ROCURONIUM BROMIDE 50 MG: 10 INJECTION, SOLUTION INTRAVENOUS at 11:46

## 2024-07-19 RX ADMIN — SCOLOPAMINE TRANSDERMAL SYSTEM 1 PATCH: 1 PATCH, EXTENDED RELEASE TRANSDERMAL at 11:56

## 2024-07-19 RX ADMIN — GABAPENTIN 600 MG: 300 CAPSULE ORAL at 10:24

## 2024-07-19 RX ADMIN — ROCURONIUM BROMIDE 10 MG: 10 INJECTION, SOLUTION INTRAVENOUS at 13:22

## 2024-07-19 RX ADMIN — HEPARIN SODIUM 5000 UNITS: 5000 INJECTION INTRAVENOUS; SUBCUTANEOUS at 23:06

## 2024-07-19 RX ADMIN — SODIUM CHLORIDE, POTASSIUM CHLORIDE, SODIUM LACTATE AND CALCIUM CHLORIDE: 600; 310; 30; 20 INJECTION, SOLUTION INTRAVENOUS at 13:53

## 2024-07-19 RX ADMIN — GENTAMICIN SULFATE 80 MG: 40 INJECTION, SOLUTION INTRAMUSCULAR; INTRAVENOUS at 11:44

## 2024-07-19 RX ADMIN — DEXAMETHASONE SODIUM PHOSPHATE 8 MG: 4 INJECTION, SOLUTION INTRAMUSCULAR; INTRAVENOUS at 12:28

## 2024-07-19 RX ADMIN — DROPERIDOL 0.62 MG: 2.5 INJECTION, SOLUTION INTRAMUSCULAR; INTRAVENOUS at 16:13

## 2024-07-19 RX ADMIN — Medication 200 MCG: at 12:12

## 2024-07-19 RX ADMIN — ONDANSETRON 4 MG: 2 INJECTION INTRAMUSCULAR; INTRAVENOUS at 13:57

## 2024-07-19 RX ADMIN — KETOROLAC TROMETHAMINE 30 MG: 30 INJECTION, SOLUTION INTRAMUSCULAR at 23:06

## 2024-07-19 RX ADMIN — FENTANYL CITRATE 50 MCG: 50 INJECTION, SOLUTION INTRAMUSCULAR; INTRAVENOUS at 15:32

## 2024-07-19 RX ADMIN — Medication 300 MCG: at 11:58

## 2024-07-19 RX ADMIN — CARVEDILOL 6.25 MG: 6.25 TABLET, FILM COATED ORAL at 23:06

## 2024-07-19 RX ADMIN — LIDOCAINE HYDROCHLORIDE 100 MG: 20 INJECTION, SOLUTION EPIDURAL; INFILTRATION; INTRACAUDAL; PERINEURAL at 11:46

## 2024-07-19 RX ADMIN — FENTANYL CITRATE 50 MCG: 50 INJECTION, SOLUTION INTRAMUSCULAR; INTRAVENOUS at 16:09

## 2024-07-19 RX ADMIN — PROPOFOL 200 MG: 10 INJECTION, EMULSION INTRAVENOUS at 11:46

## 2024-07-19 RX ADMIN — EPHEDRINE SULFATE 10 MG: 50 INJECTION, SOLUTION INTRAVENOUS at 12:08

## 2024-07-19 RX ADMIN — SUGAMMADEX 200 MG: 100 INJECTION, SOLUTION INTRAVENOUS at 14:53

## 2024-07-19 RX ADMIN — SODIUM CHLORIDE, POTASSIUM CHLORIDE, SODIUM LACTATE AND CALCIUM CHLORIDE 40 ML/HR: 600; 310; 30; 20 INJECTION, SOLUTION INTRAVENOUS at 23:04

## 2024-07-19 RX ADMIN — ONDANSETRON 4 MG: 2 INJECTION INTRAMUSCULAR; INTRAVENOUS at 15:29

## 2024-07-19 RX ADMIN — SODIUM CHLORIDE, POTASSIUM CHLORIDE, SODIUM LACTATE AND CALCIUM CHLORIDE: 600; 310; 30; 20 INJECTION, SOLUTION INTRAVENOUS at 11:29

## 2024-07-19 RX ADMIN — KETOROLAC TROMETHAMINE 30 MG: 30 INJECTION, SOLUTION INTRAMUSCULAR at 14:31

## 2024-07-19 RX ADMIN — ACETAMINOPHEN 975 MG: 325 TABLET ORAL at 23:05

## 2024-07-19 RX ADMIN — ROCURONIUM BROMIDE 10 MG: 10 INJECTION, SOLUTION INTRAVENOUS at 12:32

## 2024-07-19 RX ADMIN — CLINDAMYCIN PHOSPHATE 900 MG: 900 INJECTION, SOLUTION INTRAVENOUS at 11:44

## 2024-07-19 RX ADMIN — MIDAZOLAM HYDROCHLORIDE 2 MG: 1 INJECTION, SOLUTION INTRAMUSCULAR; INTRAVENOUS at 11:41

## 2024-07-19 SDOH — SOCIAL STABILITY: SOCIAL INSECURITY: ARE THERE ANY APPARENT SIGNS OF INJURIES/BEHAVIORS THAT COULD BE RELATED TO ABUSE/NEGLECT?: NO

## 2024-07-19 SDOH — SOCIAL STABILITY: SOCIAL INSECURITY: HAVE YOU HAD THOUGHTS OF HARMING ANYONE ELSE?: NO

## 2024-07-19 SDOH — SOCIAL STABILITY: SOCIAL INSECURITY: WERE YOU ABLE TO COMPLETE ALL THE BEHAVIORAL HEALTH SCREENINGS?: YES

## 2024-07-19 SDOH — SOCIAL STABILITY: SOCIAL INSECURITY: ARE YOU OR HAVE YOU BEEN THREATENED OR ABUSED PHYSICALLY, EMOTIONALLY, OR SEXUALLY BY ANYONE?: NO

## 2024-07-19 SDOH — SOCIAL STABILITY: SOCIAL INSECURITY: DO YOU FEEL UNSAFE GOING BACK TO THE PLACE WHERE YOU ARE LIVING?: NO

## 2024-07-19 SDOH — SOCIAL STABILITY: SOCIAL INSECURITY: HAVE YOU HAD ANY THOUGHTS OF HARMING ANYONE ELSE?: NO

## 2024-07-19 SDOH — SOCIAL STABILITY: SOCIAL INSECURITY: DOES ANYONE TRY TO KEEP YOU FROM HAVING/CONTACTING OTHER FRIENDS OR DOING THINGS OUTSIDE YOUR HOME?: NO

## 2024-07-19 SDOH — SOCIAL STABILITY: SOCIAL INSECURITY: DO YOU FEEL ANYONE HAS EXPLOITED OR TAKEN ADVANTAGE OF YOU FINANCIALLY OR OF YOUR PERSONAL PROPERTY?: NO

## 2024-07-19 SDOH — SOCIAL STABILITY: SOCIAL INSECURITY: HAS ANYONE EVER THREATENED TO HURT YOUR FAMILY OR YOUR PETS?: NO

## 2024-07-19 SDOH — SOCIAL STABILITY: SOCIAL INSECURITY: ABUSE: ADULT

## 2024-07-19 ASSESSMENT — ACTIVITIES OF DAILY LIVING (ADL)
DRESSING YOURSELF: INDEPENDENT
TOILETING: INDEPENDENT
WALKS IN HOME: INDEPENDENT
BATHING: INDEPENDENT
HEARING - RIGHT EAR: FUNCTIONAL
PATIENT'S MEMORY ADEQUATE TO SAFELY COMPLETE DAILY ACTIVITIES?: YES
ADEQUATE_TO_COMPLETE_ADL: YES
JUDGMENT_ADEQUATE_SAFELY_COMPLETE_DAILY_ACTIVITIES: YES
HEARING - LEFT EAR: FUNCTIONAL
GROOMING: INDEPENDENT
LACK_OF_TRANSPORTATION: NO
FEEDING YOURSELF: INDEPENDENT
ASSISTIVE_DEVICE: EYEGLASSES

## 2024-07-19 ASSESSMENT — COLUMBIA-SUICIDE SEVERITY RATING SCALE - C-SSRS
4. HAVE YOU HAD THESE THOUGHTS AND HAD SOME INTENTION OF ACTING ON THEM?: NO
1. IN THE PAST MONTH, HAVE YOU WISHED YOU WERE DEAD OR WISHED YOU COULD GO TO SLEEP AND NOT WAKE UP?: NO
2. HAVE YOU ACTUALLY HAD ANY THOUGHTS OF KILLING YOURSELF?: NO
5. HAVE YOU STARTED TO WORK OUT OR WORKED OUT THE DETAILS OF HOW TO KILL YOURSELF? DO YOU INTEND TO CARRY OUT THIS PLAN?: NO
6. HAVE YOU EVER DONE ANYTHING, STARTED TO DO ANYTHING, OR PREPARED TO DO ANYTHING TO END YOUR LIFE?: NO

## 2024-07-19 ASSESSMENT — PAIN - FUNCTIONAL ASSESSMENT
PAIN_FUNCTIONAL_ASSESSMENT: UNABLE TO SELF-REPORT
PAIN_FUNCTIONAL_ASSESSMENT: UNABLE TO SELF-REPORT
PAIN_FUNCTIONAL_ASSESSMENT: 0-10
PAIN_FUNCTIONAL_ASSESSMENT: VAS (VISUAL ANALOG SCALE)
PAIN_FUNCTIONAL_ASSESSMENT: UNABLE TO SELF-REPORT
PAIN_FUNCTIONAL_ASSESSMENT: 0-10
PAIN_FUNCTIONAL_ASSESSMENT: UNABLE TO SELF-REPORT
PAIN_FUNCTIONAL_ASSESSMENT: 0-10
PAIN_FUNCTIONAL_ASSESSMENT: UNABLE TO SELF-REPORT
PAIN_FUNCTIONAL_ASSESSMENT: UNABLE TO SELF-REPORT
PAIN_FUNCTIONAL_ASSESSMENT: 0-10
PAIN_FUNCTIONAL_ASSESSMENT: 0-10
PAIN_FUNCTIONAL_ASSESSMENT: UNABLE TO SELF-REPORT

## 2024-07-19 ASSESSMENT — PAIN SCALES - GENERAL
PAINLEVEL_OUTOF10: 4
PAINLEVEL_OUTOF10: 4
PAINLEVEL_OUTOF10: 0 - NO PAIN
PAINLEVEL_OUTOF10: 8
PAINLEVEL_OUTOF10: 5 - MODERATE PAIN
PAINLEVEL_OUTOF10: 7
PAINLEVEL_OUTOF10: 0 - NO PAIN
PAINLEVEL_OUTOF10: 6

## 2024-07-19 ASSESSMENT — LIFESTYLE VARIABLES
SKIP TO QUESTIONS 9-10: 1
HOW OFTEN DO YOU HAVE A DRINK CONTAINING ALCOHOL: 2-4 TIMES A MONTH
AUDIT-C TOTAL SCORE: 2
HOW OFTEN DO YOU HAVE 6 OR MORE DRINKS ON ONE OCCASION: NEVER
HOW MANY STANDARD DRINKS CONTAINING ALCOHOL DO YOU HAVE ON A TYPICAL DAY: 1 OR 2
AUDIT-C TOTAL SCORE: 2

## 2024-07-19 ASSESSMENT — COGNITIVE AND FUNCTIONAL STATUS - GENERAL
PATIENT BASELINE BEDBOUND: NO
DAILY ACTIVITIY SCORE: 24
MOBILITY SCORE: 24

## 2024-07-19 ASSESSMENT — PATIENT HEALTH QUESTIONNAIRE - PHQ9
SUM OF ALL RESPONSES TO PHQ9 QUESTIONS 1 & 2: 0
1. LITTLE INTEREST OR PLEASURE IN DOING THINGS: NOT AT ALL
2. FEELING DOWN, DEPRESSED OR HOPELESS: NOT AT ALL

## 2024-07-19 NOTE — DISCHARGE INSTRUCTIONS
Call Dr. Atwood for any problems and/or concerns at [phone]    *Walk as much as possible, it is ok to use stairs carefully  *Continue the coughing and deep breathing exercises that your learned in the hospital  *No lifting/straining greater than 10 pounds for 6 weeks (Avoid strenuous activity)  *Vaginal rest for 6 weeks (Do not put anything in your vagina. Do not use tampons or douches. Do not have sex until cleared by physician. If you were prescribed vaginal estrogen cream please use as directed)  *Prevent constipation by using stool softeners and staying hydrated, so that you do not strain against your stitches or have pain from constipation    Call Doctor right away for:    *Fever above 100.4/shaking chills  *Bright red vaginal bleeding or bleeding that soaks more than one sanitary pad per hour  *A foul smelling discharge from the vagina  *Trouble urinating or burning with urination  *Severe pain or bloating in your abdomen  *Persistent nausea/vomiting  *Redness, swelling, or drainage at your incision sites  *Chest pain/shortness of breath-call [phone].    - You will be going home with prescriptions for pain medication. If you are able to take them, alternate a dose of Acetaminophen (Tylenol) and Ibuprofen (Motrin) every 3 hours. (For example, 1000mg of Tylenol at 09:00am, 600mg ibuprofen at 12:00pm, 1000mg of Tylenol at 3:00pm, 600mg ibuprofen at 6:00pm). Use any prescribed narcotic (ie oxycodone) for breakthrough pain as prescribed. Use a stool softener, such as Miralax, to prevent constipation from the narcotic medication. If you are going home with a prescription for estrogen cream, use vaginally as prescribed nightly until your 6 week post-op visit.

## 2024-07-19 NOTE — ANESTHESIA PREPROCEDURE EVALUATION
Patient: Zenaida Morales    Procedure Information       Anesthesia Start Date/Time: 07/19/24 1129    Procedures:       Robotic Sacrocolpopexy; Posterior Repair; Possible Perineorrhahpy      Cystoscopy    Location: [facility] OR 08 / Virtual [facility] OR    Surgeons: Jocelyn Atwood MD            Relevant Problems   No relevant active problems       Clinical information reviewed:   Tobacco  Allergies  Meds   Med Hx  Surg Hx  OB Status  Fam Hx  Soc   Hx        NPO Detail:  NPO/Void Status  Carbohydrate Drink Given Prior to Surgery? : N  Date of Last Liquid: 07/19/24  Time of Last Liquid: 0630  Date of Last Solid: 07/19/24  Time of Last Solid: 0000  Last Intake Type: Clear fluids  Time of Last Void: 0900         Physical Exam    Airway  Mallampati: III  TM distance: >3 FB  Neck ROM: full     Cardiovascular    Dental    Pulmonary    Abdominal            Anesthesia Plan    History of general anesthesia?: yes  History of complications of general anesthesia?: no    ASA 3     general     intravenous induction   Anesthetic plan and risks discussed with patient.    Plan discussed with CRNA.

## 2024-07-19 NOTE — OP NOTE
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] Post-op Diagnosis     * Vaginal vault prolapse [N81.9]     Procedures  Robot-assisted sacrocolpopexy  Bilateral salpingectomy   Midurethral sling  Posterior repair  Perineorrhaphy  Cystourethroscopy       Surgeons      * Jocelyn Atwood - Primary    Resident/Fellow/Other Assistant:  Surgeons and Role:     * Gemini Ramirez MD - Assisting     * Jayleen Garza MD - Assisting    Procedure Summary  Anesthesia: General  ASA: III  Anesthesia Staff: Anesthesiologist: Jeremy Dudley MD; Portillo Pack MD  CRNA: NIDIA Hardy-CRNA  C-AA: NICK Benitez  Estimated Blood Loss: 30mL  IVF: 1300 ml  Intra-op Medications: Administrations occurring from 1130 to 1530 on 24:           Anesthesia Record               Intraprocedure I/O Totals       None           Specimen: No specimens collected     Staff:   Circulator: Zuleyka  Circulator: Anmol  Scrub Person: Chris Ramon Scrub: Nuria    Findings: Normal   At the conclusion of the procedure, all components were well-supported.  Laparoscopy: Normal appearing tubes and ovaries, prior supracervical hysterectomy with thin adhesions from bladder to cervix   Cystoscopy: area of punctate bleeding at bladder dome ok that resolved with Bugbee electrocautery.  No sutures in bladder and no trocar injuries noted.  Bilateral ureteral jets seen.       Procedure Details:  The patient was seen in the preoperative area. The site of surgery was properly noted/marked if necessary per policy. The patient has been actively warmed in preoperative area. Preoperative antibiotics have been ordered and given within 1 hours of incision. Venous thrombosis prophylaxis: SCDs    The patient was interviewed in the pre-op holding area by the surgical team, where the risks, benefits, and indication  of the procedure were reviewed.  She was then transferred to the operating suite where the patient was properly identified and the procedure was confirmed. When adequate anesthesia was obtained, the patient was prepped and draped in a dorsal lithotomy position utilizing yellow fin stirrups, the pink pad positioning device and purple arm protectors. A time-out was performed. Preoperative antibiotics were given (gent/clinda). A son catheter was inserted under sterile conditions. A EEA sizer was placed in the vagina.    We then turned our attention to the abdomen. Abdominal insufflation was obtained via open Melendez within the umbilicus. Pneumoperitoneum was obtained. The remainder of the trochars were then placed, three 8 mm robotic trochars, and one 12 mm assistant trochar.    The patient was then placed in steep Trendelenburg position, and the above findings were noted. We began the procedure by docking the robot in the standard fashion and inserting the robotic instruments.    The right and left fallopian tubes were  from the ovary by cauterizing and cutting the mesosalpinx.    The anterior vagina was then  from the bladder with a combination of sharp, blunt, and cautery dissection. No dissection was performed on the posterior vagina. Next, the peritoneum overlying the sacrum was incised with the monopolar scissors, exposing the periosteum.  The Y mesh was sutured to the vagina both anteriorly and posteriorly with a series of interrupted 0 Gortex sutures. A figure of eight suture was placed in the anterior longitudinal ligament and was cut to create four ends. The ends were brought through the mesh and tied down such that it elevated the vaginal apex without undue tension.    The peritoneum overlying the sacrum was then closed with a running 0-Vicryl suture, and the bladder peritoneum was brought down to meet the sacral peritoneum.    The 12 mm port was then removed and the fascia was closed with  the Camden Merary device and a tie of 0-Vicryl. All of the remaining ports were then removed. The fascia of the umbilical port site was then closed with a running 0-Vicryl stitch and the skin of the umbilical port and all the other ports was closed with 4-0 Monocryl. All incisions were covered with Dermabond.    Cystoscopy was performed.  Bilateral brisk ureteral spill was seen.  Area of pinpint bleeding at bladder dome noted with no evidence of sutures.  This area was cauterized with Bugbee after changing cysto fluid to H2O and hemostasis was achieved.     The Lonestar retractor was placed around the perineum. The anterior vaginal wall was infiltrated with lidocaine 1% with epinephrine and a superficial incision starting approximately 1 cm inferior to the urethral meatus was carried out approximately 1.5 cm with a knife. From superficial to deep, the anterior vaginal wall was then dissected from the tissue surrounding the urethra until the pubic bone was reached. This procedure was repeated on the opposite side. Two small knife holes were then created in the mons pubis just cephalad and approximately 1 cm lateral to the pubic symphysis. The sling trocars were then inserted through the holes in the vagina and were brought out through the mons pubis. A cystoscopy was performed and no defects were noted within the bladder wall. Bilateral ureteral orifices also produced normal jets. The mesh sling was then pulled through the dissected space. After the sling was tensioned appropriately, the remainder of the mesh protruding from the mons pubis was cut, leaving just two puncture holes on the mons pubis. The anterior vaginal wall was then closed with a 3-0 Monocryl suture in a running fashion. Two additional interrupted sutures were used to reinforce the closure. Dermabond was placed over the puncture holes located on the mons pubis.    Finally, attention was placed on the perineoplasty/posterior repair portion of the  procedure. The perineoplasty margins were grasped with Allis clamps and the area was injected with lidocaine with epinephrine. The reilly shape was the incised with the knife and the overlying skin and mucosa was excised with the knife. The perineal muscles were  from the overlying mucosa with Metzenbaum scissors. The remaining defect in the posterior wall was also dissected free from its surrounding fibromuscular tissue. An 0-Vicryl was then used to re-approximate first the fibromuscular tissue between the vagina and rectum and then between the perineal muscles. The vaginal mucosa was then closed with a 4-0 Monocryl.    The patient was then awakened from anesthesia, having tolerated procedure well, and was taken to the recovery room in stable condition. All sponge, needle, and instrument counts were correct at the end the case.    Dr. Atwood was present for all of the procedure.    Gemini Plasencia MD  Urogynecology and Reproductive Pelvic Surgery Fellow      Complications:  None; patient tolerated the procedure well.     Disposition: PACU - hemodynamically stable.  Condition: stable    Agree with above   I was present and scrubbed for the entire case.  Jocelyn Atwood MD

## 2024-07-19 NOTE — H&P
History Of Present Illness  Zenaida Morales is a 59 y.o. female with MILY & apical and anterior vaginal prolapse, here for robotic sacrocolpopexy, posterior repair, possible perineorrhahpy, and cystoscopy    UDS with MILY and ISD.    POP-Q measurements were:      Aa: +3, Ba: +3, C: -2     gH: 3, pB: 3, TVL: 8     Ap: 0, Bp: 0 D: -4     Past Medical History  Past Medical History:   Diagnosis Date    ADHD     Asthma ([facility])     CHF (congestive heart failure) ([facility])     Per [facility] cardiology note 2018    GERD (gastroesophageal reflux disease)     H/O echocardiogram 12/19/2023    Scanned to media (no evidence of dilated cardiomyopathy)    H/O right heart catheterization 02/25/2015    Hyperlipidemia     Hypertension     Lumbago with sciatica     Lumbar spondylosis     Obesity     Orthopnea     Panic disorder     PONV (postoperative nausea and vomiting)     Sleep apnea     unable to tolerate CPAP    Type 2 diabetes mellitus ([facility])     Urge incontinence of urine     Vaginal vault prolapse        Surgical History  Past Surgical History:   Procedure Laterality Date    CARDIAC CATHETERIZATION Right 02/25/2015    COLONOSCOPY      HYSTERECTOMY      UPPER GASTROINTESTINAL ENDOSCOPY     laparoscopic supracervical hysterectomy 20 yrs ago     Social History  She reports that she has never smoked. She has never used smokeless tobacco. She reports current alcohol use of about 2.0 standard drinks of alcohol per week. She reports that she does not use drugs.    Family History  Family History   Problem Relation Name Age of Onset    Asthma Mother      Heart attack Father          s/p CABG    Lung cancer Father      Heart failure Brother          pacemaker        Allergies  Cefdinir, Penicillins, Sulfa-gyn, Codeine, and Minocycline      Physical Exam  General: Alert and oriented, in NAD  Neuro: Awake, alert, conversational  CV: Regular rate  Respiratory: Even and unlabored on RA  Extremities: Full ROM  Psych: appropriate mood and  affect    Last Recorded Vitals  There were no vitals taken for this visit.    Relevant Results  Lab Results   Component Value Date    WBC 6.7 07/16/2024    HGB 14.0 07/16/2024    HCT 43.6 07/16/2024    MCV 93 07/16/2024     07/16/2024     Lab Results   Component Value Date    GLUCOSE 126 (H) 07/16/2024    CALCIUM 9.2 07/16/2024     07/16/2024    K 4.5 07/16/2024    CO2 28 07/16/2024     07/16/2024    BUN 16 07/16/2024    CREATININE 0.57 07/16/2024     Assessment/Plan    - Proceed with robotic sacrocolpopexy, posterior repair, possible perineorrhahpy, and cystoscopy, as scheduled    To be Discussed with Dr. Angelic Garza MD, PGY-2 Angelic

## 2024-07-19 NOTE — PERIOPERATIVE NURSING NOTE
1517 Patient arrived to [facility] after procedure with Anesthesia and procedure RN, procedure discussed, plan reviewed, VSS    1520 family updated via secure text    1525 pt very restless, stating nausea and uncomfortable, cold packs and emesis bag provided    1529 zofran given per order    1532 fantanyl given per order for pain     1540 pt desaturating when sleeping, rises back up when pt awakens.     1545 pt again woken up due to desaturating, repositioned, pt restless, oxygen saturation back into high 90's when wakes, pulse ox changed due to desaturation down to 70's when sleeping, rhonchi noted with expiratory breaths    1609 pt medicated per order for pain     1620 pt placed back on facemask oxygen due to apnea when sleeping and desaturation down into the 70's, pulse ox prob changed     1625 Dr Dudley at bedside assessing pt due to apnic spells, no knew orders at this time    1640 Dr Dudley called back to bedside due to pt somnolent and desaturating on 10l face mask    1643 Dr Dudley at bedside, no knew orders at this time, give pt time and no more medications till pt wakens    1650 Dr Atwood and Dr Ramirez notified    1715 Handoff to SONNY Zapien

## 2024-07-19 NOTE — PERIOPERATIVE NURSING NOTE
Procedure: Robotic Sacrocolpopexy; posterior repair; possible perineorrhahpy; cystoscopy, salpingectomy  Incision: 4 port sites closed with liquiband. Clean, dry, intact.  Anesthesia type (i.e. general, regional, MAC): general  Nerve block (if applicable): n/a  Estimated blood loss (EBL) in OR: 30  Orientation/mental status: oriented to person, place, time, situation. Pt is very drowsy.  IV site(s), and drips/fluids currently infusing: Lactated ringers infusing through 20g right fa at 100ml/hr  PO status (last oral intake): sipping ice water in recovery.  O2 requirements: 100% 4lpm nc. Baseline is rom air. Pt has asthma and PARUL but is non compliant with cpap  Current pain level & last pain/nausea medication dose/time given:  Zofran 4mg 1529  Fentanyl 50mcg 1532  Fentanyl 50 mcg 1609  Droperidol 0.625 1613  Next antibiotic due @ none ordered  Last void/Son in place: son in place. Void trial tomorrow prior to discharge  Equipment sent with patient (i.e. Polar Cube, TENs unit, walker, crutches): glasses  SCDs on (yes/no): yes  Belongings sent with patient: glasses,   Emergency contact (name, relationship, phone number): Sona (Daughter) [phone]  PMH: adhd, asthma, gerd, hld, htn, dm, panic disorder, PARUL (non compliant), CHF    Pt cannot lay flat d/t CHF. She states it is difficult for her to breath.

## 2024-07-19 NOTE — ANESTHESIA PROCEDURE NOTES
Airway  Date/Time: 7/19/2024 11:48 AM  Urgency: elective    Airway not difficult    Staffing  Performed: NICK   Authorized by: Portillo Pack MD    Performed by: NICK Benitez  Patient location during procedure: OR    Indications and Patient Condition  Indications for airway management: anesthesia  Spontaneous Ventilation: absent  Sedation level: deep  Preoxygenated: yes  Patient position: sniffing  Mask difficulty assessment: 1 - vent by mask    Final Airway Details  Final airway type: endotracheal airway      Successful airway: ETT  Cuffed: yes   Successful intubation technique: direct laryngoscopy  Endotracheal tube insertion site: oral  Blade: Fazal  Blade size: #3  ETT size (mm): 7.0  Cormack-Lehane Classification: grade I - full view of glottis  Placement verified by: chest auscultation and capnometry   Measured from: lips  ETT to lips (cm): 21

## 2024-07-20 VITALS
DIASTOLIC BLOOD PRESSURE: 67 MMHG | HEIGHT: 66 IN | SYSTOLIC BLOOD PRESSURE: 103 MMHG | TEMPERATURE: 98 F | WEIGHT: 184.75 LBS | RESPIRATION RATE: 18 BRPM | HEART RATE: 78 BPM | OXYGEN SATURATION: 92 % | BODY MASS INDEX: 29.69 KG/M2

## 2024-07-20 PROCEDURE — 96372 THER/PROPH/DIAG INJ SC/IM: CPT | Performed by: STUDENT IN AN ORGANIZED HEALTH CARE EDUCATION/TRAINING PROGRAM

## 2024-07-20 PROCEDURE — 2500000004 HC RX 250 GENERAL PHARMACY W/ HCPCS (ALT 636 FOR OP/ED): Performed by: STUDENT IN AN ORGANIZED HEALTH CARE EDUCATION/TRAINING PROGRAM

## 2024-07-20 PROCEDURE — 2500000002 HC RX 250 W HCPCS SELF ADMINISTERED DRUGS (ALT 637 FOR MEDICARE OP, ALT 636 FOR OP/ED): Performed by: STUDENT IN AN ORGANIZED HEALTH CARE EDUCATION/TRAINING PROGRAM

## 2024-07-20 PROCEDURE — 2500000005 HC RX 250 GENERAL PHARMACY W/O HCPCS: Performed by: STUDENT IN AN ORGANIZED HEALTH CARE EDUCATION/TRAINING PROGRAM

## 2024-07-20 PROCEDURE — 7100000011 HC EXTENDED STAY RECOVERY HOURLY - NURSING UNIT

## 2024-07-20 PROCEDURE — G0378 HOSPITAL OBSERVATION PER HR: HCPCS

## 2024-07-20 PROCEDURE — 2500000001 HC RX 250 WO HCPCS SELF ADMINISTERED DRUGS (ALT 637 FOR MEDICARE OP): Performed by: STUDENT IN AN ORGANIZED HEALTH CARE EDUCATION/TRAINING PROGRAM

## 2024-07-20 RX ORDER — HYDROMORPHONE HYDROCHLORIDE 2 MG/1
2 TABLET ORAL EVERY 4 HOURS PRN
Status: DISCONTINUED | OUTPATIENT
Start: 2024-07-20 | End: 2024-07-20 | Stop reason: HOSPADM

## 2024-07-20 RX ORDER — HYDROMORPHONE HYDROCHLORIDE 2 MG/1
2 TABLET ORAL EVERY 6 HOURS PRN
Qty: 15 TABLET | Refills: 0 | Status: SHIPPED | OUTPATIENT
Start: 2024-07-20

## 2024-07-20 RX ORDER — LIDOCAINE 560 MG/1
2 PATCH PERCUTANEOUS; TOPICAL; TRANSDERMAL DAILY
Status: DISCONTINUED | OUTPATIENT
Start: 2024-07-20 | End: 2024-07-20 | Stop reason: HOSPADM

## 2024-07-20 RX ADMIN — POLYETHYLENE GLYCOL 3350 17 G: 17 POWDER, FOR SOLUTION ORAL at 08:54

## 2024-07-20 RX ADMIN — HYDROMORPHONE HYDROCHLORIDE 2 MG: 2 TABLET ORAL at 17:31

## 2024-07-20 RX ADMIN — HEPARIN SODIUM 5000 UNITS: 5000 INJECTION INTRAVENOUS; SUBCUTANEOUS at 14:55

## 2024-07-20 RX ADMIN — ONDANSETRON 4 MG: 2 INJECTION INTRAMUSCULAR; INTRAVENOUS at 13:38

## 2024-07-20 RX ADMIN — CARVEDILOL 6.25 MG: 6.25 TABLET, FILM COATED ORAL at 08:54

## 2024-07-20 RX ADMIN — ACETAMINOPHEN 975 MG: 325 TABLET ORAL at 08:54

## 2024-07-20 RX ADMIN — ACETAMINOPHEN 975 MG: 325 TABLET ORAL at 14:56

## 2024-07-20 RX ADMIN — PANTOPRAZOLE SODIUM 40 MG: 40 TABLET, DELAYED RELEASE ORAL at 06:00

## 2024-07-20 RX ADMIN — VALSARTAN 80 MG: 80 TABLET, FILM COATED ORAL at 08:54

## 2024-07-20 RX ADMIN — LIDOCAINE 4% 2 PATCH: 40 PATCH TOPICAL at 08:54

## 2024-07-20 RX ADMIN — HYDROMORPHONE HYDROCHLORIDE 2 MG: 2 TABLET ORAL at 13:35

## 2024-07-20 RX ADMIN — HEPARIN SODIUM 5000 UNITS: 5000 INJECTION INTRAVENOUS; SUBCUTANEOUS at 06:00

## 2024-07-20 RX ADMIN — KETOROLAC TROMETHAMINE 30 MG: 30 INJECTION, SOLUTION INTRAMUSCULAR at 08:13

## 2024-07-20 ASSESSMENT — PAIN - FUNCTIONAL ASSESSMENT
PAIN_FUNCTIONAL_ASSESSMENT: 0-10
PAIN_FUNCTIONAL_ASSESSMENT: 0-10

## 2024-07-20 ASSESSMENT — PAIN SCALES - GENERAL
PAINLEVEL_OUTOF10: 7
PAINLEVEL_OUTOF10: 0 - NO PAIN
PAINLEVEL_OUTOF10: 9
PAINLEVEL_OUTOF10: 6

## 2024-07-20 NOTE — PROGRESS NOTES
"POST OPERATIVE Note      Zenaida Morales is a 59 y.o. female who is POD 1 s/p robot-assisted sacrocolpopexy, posterior repair, perineorrhaphy, midurethral sling, and cystoscopy.   Was admitted for monitoring after desaturations in PACU      INTERVAL HISTORY:  Patient reports no pain at rest but significant pain with moving and coughing. She's used tylenol and toradol only for pain.  Tolerating regular diet, no n/v  Florez remains in place  Has not been out of bed    PHYSICAL EXAM:  /64 (BP Location: Right arm, Patient Position: Lying)   Pulse 61   Temp 36.9 °C (98.4 °F)   Resp 18   Ht 1.676 m (5' 6\")   Wt 83.8 kg (184 lb 11.9 oz)   SpO2 97%   BMI 29.82 kg/m²     General Appearance:A&O x3 in no acute distress   Head: normocephalic, and atraumatic  Respiratory: breathing comfortably on room air (was on 2L NC overnight but saturating >97%), lungs clear to auscultation without crackles, taking shallow breaths 2/2 pain  Cardiovascular: RRR  Abdomen: Abdomen soft, non-distended, + bowel sounds, incisions intact with glue, minimally tender no rebound      Impression/plan:  Zenaida Morales is a 59 y.o. female who is POD 1 s/p robot-assisted sacrocolpopexy, posterior repair, perineorrhaphy, midurethral sling, and cystoscopy.   Was admitted for monitoring after desaturations in PACU.    # Post-op  - slowly progressing towards milestones  - exam and VS benign   - discussed multimodal pain regimen including use of abdominal binder and lidocaine patches  - active void trail this AM  - needs to ambulate   - anticipate dc today pending above milestones  - reviewed post-op instructions     # Desaturations  - now on RA  - lungs clear to auscultation     # HTN: normotensive  - ordered for home valsartan and coreg    Dr. Atwood aware    Gemini Joy MD    "

## 2024-07-20 NOTE — NURSING NOTE
Patient is still very drowsy from surgery. Per Dr. Ramirez it is ok to pull and void trial at 0730. Confirmed with charge nurse Melina Rose.

## 2024-07-21 ENCOUNTER — TELEPHONE (OUTPATIENT)
Dept: OBSTETRICS AND GYNECOLOGY | Facility: CLINIC | Age: 59
End: 2024-07-21
Payer: COMMERCIAL

## 2024-07-21 PROBLEM — N39.3 FEMALE STRESS INCONTINENCE: Status: RESOLVED | Noted: 2024-07-21 | Resolved: 2024-07-21

## 2024-07-21 PROBLEM — N39.3 FEMALE STRESS INCONTINENCE: Status: ACTIVE | Noted: 2024-07-21

## 2024-07-21 PROBLEM — N81.9 VAGINAL VAULT PROLAPSE: Status: RESOLVED | Noted: 2024-04-22 | Resolved: 2024-07-21

## 2024-07-21 PROBLEM — Z90.710 STATUS POST HYSTERECTOMY: Status: RESOLVED | Noted: 2024-07-19 | Resolved: 2024-07-21

## 2024-07-21 NOTE — TELEPHONE ENCOUNTER
"Call to patient for follow up from surgery on 7/19 with Dr Atwood. Patient states that she is having a \"bad day\". She is very uncomfortable today. She is taking tylenol and ibuprofen, as well as dilaudid, when it gets really uncomfortable. She said that she is nauseous and taking zofran. If further discussion, she is taking pain medications on an empty stomach ( has no appetite ). Explained to her that she needs to eat \"prior\" to taking any pain meds; that is probably what is making her nauseous. She is passing gas; but no bowel movement. Explained that narcotics may cause constipation. Told to take miralax again in the evening; took 1 dose today so far. She sounds very stuffy and is draining yellow discharge from nose. Told her that she may have to reach out to PCP or urgent care. Advised to use saline nasal spray for temporary relief, as well as hot pack to forehead and nose. Will check back with her tomorrow.  "

## 2024-07-21 NOTE — DISCHARGE SUMMARY
Discharge Diagnosis  Vaginal vault prolapse, MILY    Issues Requiring Follow-Up  Postop care    Test Results Pending At Discharge  Pending Labs       Order Current Status    Surgical Pathology Exam Collected (07/19/24 1246)            Hospital Course  60 yo with MILY & apical and anterior vaginal prolapse, here for robotic sacrocolpopexy, posterior repair, possible perineorrhahpy, and cystoscopy    UDS with MILY and ISD    POP-Q measurements were:      Aa: +3, Ba: +3, C: -2     gH: 3, pB: 3, TVL: 8     Ap: 0, Bp: 0 D: -4    PMH: ADHD, asthma, GERD, HLD, HTN, sciatica, lumbar spondylosis, panic disorder, PARUL, T2DM, 1st degree AV block, CHF with EF 62%  PSH: laparoscopic supracervical hysterectomy, s/p R cardiac catheterization in 2015  OBGYN:  Meds: lipitor, Coreg, Voltaren, Allegra, Lasic, Jardiance, Claritin, Ritalin, Protonix, K cholirde, Valsartan,   all: Cefdinir, PCN, sulfa drugs, codeine, minocycline  Soc: 2 drinks of ETOH/wk, no tobacco or drug use  Fhx: noncontributory    Lab Results   Component Value Date    WBC 6.7 07/16/2024    HGB 14.0 07/16/2024    HCT 43.6 07/16/2024    MCV 93 07/16/2024     07/16/2024     Lab Results   Component Value Date    GLUCOSE 126 (H) 07/16/2024    CALCIUM 9.2 07/16/2024     07/16/2024    K 4.5 07/16/2024    CO2 28 07/16/2024     07/16/2024    BUN 16 07/16/2024    CREATININE 0.57 07/16/2024   Pt had issues with desaturation and pain control overnight. Improved POD#1. Passed VT. Home POD#1    Pertinent Physical Exam At Time of Discharge  Physical Exam    Home Medications     Medication List      START taking these medications     HYDROmorphone 2 mg tablet; Commonly known as: Dilaudid; Take 1 tablet (2   mg) by mouth every 6 hours if needed for severe pain (7 - 10).   ibuprofen 600 mg tablet; Take 1 tablet (600 mg) by mouth every 6 hours   if needed for mild pain (1 - 3) for up to 14 days.   ondansetron ODT 4 mg disintegrating tablet; Commonly known as:    Zofran-ODT; Take 1 tablet (4 mg) by mouth every 8 hours if needed for   nausea or vomiting for up to 14 days.   polyethylene glycol 17 gram packet; Commonly known as: Glycolax,   Miralax; Take 17 g by mouth once daily for 14 days. Take until you have   your first bowel movement   traMADol 50 mg tablet; Commonly known as: Ultram; Take 1 tablet (50 mg)   by mouth every 6 hours if needed for moderate pain (4 - 6).     CHANGE how you take these medications     * acetaminophen 325 mg tablet; Commonly known as: Tylenol; What changed:   Another medication with the same name was added. Make sure you understand   how and when to take each.   * acetaminophen 325 mg tablet; Commonly known as: Tylenol; Take 2   tablets (650 mg) by mouth every 6 hours if needed for mild pain (1 - 3)   for up to 14 days.; What changed: You were already taking a medication   with the same name, and this prescription was added. Make sure you   understand how and when to take each.  * This list has 2 medication(s) that are the same as other medications   prescribed for you. Read the directions carefully, and ask your doctor or   other care provider to review them with you.     CONTINUE taking these medications     atorvastatin 40 mg tablet; Commonly known as: Lipitor   carvedilol 6.25 mg tablet; Commonly known as: Coreg   chlorhexidine 0.12 % solution; Commonly known as: Peridex; Swish for 30   seconds and spit 15mL of solution the night before and morning of surgery   Claritin 10 mg tablet; Generic drug: loratadine   diclofenac sodium 1 % gel; Commonly known as: Voltaren   fexofenadine 180 mg tablet; Commonly known as: Allegra   furosemide 40 mg tablet; Commonly known as: Lasix   Jardiance 25 mg; Generic drug: empagliflozin   methylphenidate 10 mg tablet; Commonly known as: Ritalin   pantoprazole 40 mg EC tablet; Commonly known as: ProtoNix   potassium chloride 20 mEq packet; Commonly known as: Klor-Con   valsartan 80 mg tablet; Commonly known  as: Mariela       Outpatient Follow-Up  Future Appointments   Date Time Provider Department Center   8/5/2024  1:45 PM MD Beatrice [id number]   9/5/2024  9:30 AM MD Beatrice [id number]       Jocelyn Atwood MD

## 2024-07-22 ENCOUNTER — TELEPHONE (OUTPATIENT)
Dept: OBSTETRICS AND GYNECOLOGY | Facility: CLINIC | Age: 59
End: 2024-07-22
Payer: COMMERCIAL

## 2024-07-22 NOTE — ANESTHESIA POSTPROCEDURE EVALUATION
Patient: Zenaida Morales    Procedure Summary       Date: 07/19/24 Room / Location: [facility] / [facility]    Anesthesia Start: 1129 Anesthesia Stop: 1516    Procedures:       Robotic Sacrocolpopexy; Posterior Repair; Perineorrhahpy      Cystoscopy      INSERTION BLADDER SLING Diagnosis:       Vaginal vault prolapse      (Vaginal vault prolapse [N81.9])    Surgeons: Jocelyn Atwood MD Responsible Provider: Jeremy Dudley MD    Anesthesia Type: general ASA Status: 3            Anesthesia Type: general    Vitals Value Taken Time   /76 07/19/24 2016   Temp 36.7 °C (98.1 °F) 07/19/24 2000   Pulse 65 07/19/24 2025   Resp 18 07/19/24 2015   SpO2 100 % 07/19/24 2025   Vitals shown include unfiled device data.    Anesthesia Post Evaluation    Patient location during evaluation: PACU  Patient participation: complete - patient participated  Level of consciousness: awake and alert  Pain management: adequate  Airway patency: patent  Cardiovascular status: acceptable and hemodynamically stable  Respiratory status: acceptable, spontaneous ventilation and nonlabored ventilation  Hydration status: acceptable  Postoperative Nausea and Vomiting: none  Comments: Admitted postop due to somnolence        There were no known notable events for this encounter.

## 2024-07-22 NOTE — TELEPHONE ENCOUNTER
"Call to patient today; she is still using dilaudid for pain control. Instructed to move around more. Needs to increase fluids. No bowel movement yet. Told to try hot beverages. Not eating much; discussed that she needs to start eating more. States that she is passing gas. Suggested smooth move tea. Still complains of possible \"sinus infection\"; instructed to contact PCP. Will check back with her tomorrow.   "

## 2024-07-24 ENCOUNTER — TELEPHONE (OUTPATIENT)
Dept: OBSTETRICS AND GYNECOLOGY | Facility: CLINIC | Age: 59
End: 2024-07-24
Payer: COMMERCIAL

## 2024-07-24 ENCOUNTER — HOSPITAL ENCOUNTER (EMERGENCY)
Facility: HOSPITAL | Age: 59
Discharge: HOME | End: 2024-07-25
Attending: EMERGENCY MEDICINE
Payer: COMMERCIAL

## 2024-07-24 DIAGNOSIS — R11.0 NAUSEA: Primary | ICD-10-CM

## 2024-07-24 DIAGNOSIS — U07.1 COVID: ICD-10-CM

## 2024-07-24 DIAGNOSIS — G89.18 POST-OP PAIN: ICD-10-CM

## 2024-07-24 PROCEDURE — 84702 CHORIONIC GONADOTROPIN TEST: CPT

## 2024-07-24 PROCEDURE — 93010 ELECTROCARDIOGRAM REPORT: CPT

## 2024-07-24 PROCEDURE — 36415 COLL VENOUS BLD VENIPUNCTURE: CPT

## 2024-07-24 PROCEDURE — 85025 COMPLETE CBC W/AUTO DIFF WBC: CPT

## 2024-07-24 PROCEDURE — 83690 ASSAY OF LIPASE: CPT

## 2024-07-24 PROCEDURE — 80053 COMPREHEN METABOLIC PANEL: CPT

## 2024-07-24 PROCEDURE — 99285 EMERGENCY DEPT VISIT HI MDM: CPT

## 2024-07-24 RX ORDER — ONDANSETRON HYDROCHLORIDE 2 MG/ML
4 INJECTION, SOLUTION INTRAVENOUS ONCE
Status: COMPLETED | OUTPATIENT
Start: 2024-07-24 | End: 2024-07-25

## 2024-07-24 RX ORDER — ACETAMINOPHEN 325 MG/1
975 TABLET ORAL ONCE
Status: COMPLETED | OUTPATIENT
Start: 2024-07-24 | End: 2024-07-25

## 2024-07-24 ASSESSMENT — PAIN DESCRIPTION - PAIN TYPE: TYPE: SURGICAL PAIN

## 2024-07-24 ASSESSMENT — PAIN DESCRIPTION - FREQUENCY: FREQUENCY: CONSTANT/CONTINUOUS

## 2024-07-24 ASSESSMENT — PAIN DESCRIPTION - ORIENTATION: ORIENTATION: MID;RIGHT;LEFT

## 2024-07-24 ASSESSMENT — PAIN DESCRIPTION - ONSET: ONSET: PROGRESSIVE

## 2024-07-24 ASSESSMENT — PAIN DESCRIPTION - LOCATION
LOCATION_2: HEAD
LOCATION: ABDOMEN

## 2024-07-24 ASSESSMENT — PAIN DESCRIPTION - PROGRESSION: CLINICAL_PROGRESSION: GRADUALLY WORSENING

## 2024-07-24 ASSESSMENT — PAIN DESCRIPTION - DESCRIPTORS
DESCRIPTORS_2: THROBBING
DESCRIPTORS: STABBING

## 2024-07-24 ASSESSMENT — PAIN - FUNCTIONAL ASSESSMENT: PAIN_FUNCTIONAL_ASSESSMENT: 0-10

## 2024-07-24 ASSESSMENT — PAIN SCALES - GENERAL
PAINLEVEL_OUTOF10: 9
PAINLEVEL_OUTOF10: 7

## 2024-07-24 NOTE — TELEPHONE ENCOUNTER
"Call to patient today for follow up from surgery on 7/19/2024. She states that she is no better. She says that her daughter had to \"manually remove stool from her rectum\". She says that after that, she is having constant diarrhea. She is feeling \"horrible\" and doesn't want to eat or get out of bed. I instructed her to go to the nearest ER for evaluation. She says that no one is home to take her and it would be later today. Once again, I told her that she needs to go to ER. I will check back with her tomorrow.   "

## 2024-07-25 ENCOUNTER — APPOINTMENT (OUTPATIENT)
Dept: CARDIOLOGY | Facility: HOSPITAL | Age: 59
End: 2024-07-25
Payer: COMMERCIAL

## 2024-07-25 ENCOUNTER — TELEPHONE (OUTPATIENT)
Dept: OBSTETRICS AND GYNECOLOGY | Facility: CLINIC | Age: 59
End: 2024-07-25
Payer: COMMERCIAL

## 2024-07-25 ENCOUNTER — APPOINTMENT (OUTPATIENT)
Dept: RADIOLOGY | Facility: HOSPITAL | Age: 59
End: 2024-07-25
Payer: COMMERCIAL

## 2024-07-25 VITALS
OXYGEN SATURATION: 95 % | HEIGHT: 66 IN | HEART RATE: 72 BPM | RESPIRATION RATE: 20 BRPM | SYSTOLIC BLOOD PRESSURE: 148 MMHG | BODY MASS INDEX: 28.12 KG/M2 | DIASTOLIC BLOOD PRESSURE: 82 MMHG | WEIGHT: 175 LBS | TEMPERATURE: 96.8 F

## 2024-07-25 LAB
ALBUMIN SERPL BCP-MCNC: 3.5 G/DL (ref 3.4–5)
ALP SERPL-CCNC: 72 U/L (ref 33–110)
ALT SERPL W P-5'-P-CCNC: 33 U/L (ref 7–45)
ANION GAP SERPL CALC-SCNC: 12 MMOL/L (ref 10–20)
APPEARANCE UR: CLEAR
AST SERPL W P-5'-P-CCNC: 23 U/L (ref 9–39)
ATRIAL RATE: 71 BPM
B-HCG SERPL-ACNC: 4 MIU/ML
BASOPHILS # BLD AUTO: 0.02 X10*3/UL (ref 0–0.1)
BASOPHILS NFR BLD AUTO: 0.3 %
BILIRUB SERPL-MCNC: 0.8 MG/DL (ref 0–1.2)
BILIRUB UR STRIP.AUTO-MCNC: NEGATIVE MG/DL
BUN SERPL-MCNC: 7 MG/DL (ref 6–23)
CALCIUM SERPL-MCNC: 8.6 MG/DL (ref 8.6–10.3)
CHLORIDE SERPL-SCNC: 101 MMOL/L (ref 98–107)
CO2 SERPL-SCNC: 30 MMOL/L (ref 21–32)
COLOR UR: COLORLESS
CREAT SERPL-MCNC: 0.52 MG/DL (ref 0.5–1.05)
EGFRCR SERPLBLD CKD-EPI 2021: >90 ML/MIN/1.73M*2
EOSINOPHIL # BLD AUTO: 0.39 X10*3/UL (ref 0–0.7)
EOSINOPHIL NFR BLD AUTO: 5.5 %
ERYTHROCYTE [DISTWIDTH] IN BLOOD BY AUTOMATED COUNT: 12.2 % (ref 11.5–14.5)
GLUCOSE SERPL-MCNC: 106 MG/DL (ref 74–99)
GLUCOSE UR STRIP.AUTO-MCNC: NORMAL MG/DL
HCT VFR BLD AUTO: 36.1 % (ref 36–46)
HGB BLD-MCNC: 11.9 G/DL (ref 12–16)
HOLD SPECIMEN: NORMAL
IMM GRANULOCYTES # BLD AUTO: 0.04 X10*3/UL (ref 0–0.7)
IMM GRANULOCYTES NFR BLD AUTO: 0.6 % (ref 0–0.9)
KETONES UR STRIP.AUTO-MCNC: ABNORMAL MG/DL
LABORATORY COMMENT REPORT: NORMAL
LEUKOCYTE ESTERASE UR QL STRIP.AUTO: ABNORMAL
LIPASE SERPL-CCNC: 7 U/L (ref 9–82)
LYMPHOCYTES # BLD AUTO: 2.12 X10*3/UL (ref 1.2–4.8)
LYMPHOCYTES NFR BLD AUTO: 30 %
MCH RBC QN AUTO: 30.1 PG (ref 26–34)
MCHC RBC AUTO-ENTMCNC: 33 G/DL (ref 32–36)
MCV RBC AUTO: 91 FL (ref 80–100)
MONOCYTES # BLD AUTO: 0.61 X10*3/UL (ref 0.1–1)
MONOCYTES NFR BLD AUTO: 8.6 %
NEUTROPHILS # BLD AUTO: 3.88 X10*3/UL (ref 1.2–7.7)
NEUTROPHILS NFR BLD AUTO: 55 %
NITRITE UR QL STRIP.AUTO: NEGATIVE
NRBC BLD-RTO: 0 /100 WBCS (ref 0–0)
P AXIS: 47 DEGREES
P OFFSET: 156 MS
P ONSET: 102 MS
PATH REPORT.FINAL DX SPEC: NORMAL
PATH REPORT.GROSS SPEC: NORMAL
PATH REPORT.RELEVANT HX SPEC: NORMAL
PATH REPORT.TOTAL CANCER: NORMAL
PH UR STRIP.AUTO: 7.5 [PH]
PLATELET # BLD AUTO: 257 X10*3/UL (ref 150–450)
POTASSIUM SERPL-SCNC: 3.6 MMOL/L (ref 3.5–5.3)
PR INTERVAL: 228 MS
PROT SERPL-MCNC: 6.3 G/DL (ref 6.4–8.2)
PROT UR STRIP.AUTO-MCNC: NEGATIVE MG/DL
Q ONSET: 216 MS
QRS COUNT: 11 BEATS
QRS DURATION: 110 MS
QT INTERVAL: 396 MS
QTC CALCULATION(BAZETT): 430 MS
QTC FREDERICIA: 419 MS
R AXIS: -50 DEGREES
RBC # BLD AUTO: 3.96 X10*6/UL (ref 4–5.2)
RBC # UR STRIP.AUTO: ABNORMAL /UL
RBC #/AREA URNS AUTO: ABNORMAL /HPF
SARS-COV-2 RNA RESP QL NAA+PROBE: DETECTED
SODIUM SERPL-SCNC: 139 MMOL/L (ref 136–145)
SP GR UR STRIP.AUTO: 1.02
SQUAMOUS #/AREA URNS AUTO: ABNORMAL /HPF
T AXIS: 48 DEGREES
T OFFSET: 414 MS
UROBILINOGEN UR STRIP.AUTO-MCNC: NORMAL MG/DL
VENTRICULAR RATE: 71 BPM
WBC # BLD AUTO: 7.1 X10*3/UL (ref 4.4–11.3)
WBC #/AREA URNS AUTO: ABNORMAL /HPF

## 2024-07-25 PROCEDURE — 87635 SARS-COV-2 COVID-19 AMP PRB: CPT

## 2024-07-25 PROCEDURE — 74177 CT ABD & PELVIS W/CONTRAST: CPT | Performed by: STUDENT IN AN ORGANIZED HEALTH CARE EDUCATION/TRAINING PROGRAM

## 2024-07-25 PROCEDURE — 93005 ELECTROCARDIOGRAM TRACING: CPT

## 2024-07-25 PROCEDURE — 2500000004 HC RX 250 GENERAL PHARMACY W/ HCPCS (ALT 636 FOR OP/ED): Performed by: EMERGENCY MEDICINE

## 2024-07-25 PROCEDURE — 71260 CT THORAX DX C+: CPT | Performed by: STUDENT IN AN ORGANIZED HEALTH CARE EDUCATION/TRAINING PROGRAM

## 2024-07-25 PROCEDURE — 96375 TX/PRO/DX INJ NEW DRUG ADDON: CPT | Mod: 59

## 2024-07-25 PROCEDURE — 81001 URINALYSIS AUTO W/SCOPE: CPT

## 2024-07-25 PROCEDURE — 2550000001 HC RX 255 CONTRASTS: Performed by: EMERGENCY MEDICINE

## 2024-07-25 PROCEDURE — 2500000004 HC RX 250 GENERAL PHARMACY W/ HCPCS (ALT 636 FOR OP/ED)

## 2024-07-25 PROCEDURE — 87086 URINE CULTURE/COLONY COUNT: CPT | Mod: STJLAB

## 2024-07-25 PROCEDURE — 96361 HYDRATE IV INFUSION ADD-ON: CPT

## 2024-07-25 PROCEDURE — 74177 CT ABD & PELVIS W/CONTRAST: CPT

## 2024-07-25 PROCEDURE — 96374 THER/PROPH/DIAG INJ IV PUSH: CPT | Mod: 59

## 2024-07-25 RX ORDER — ACETAMINOPHEN 325 MG/1
650 TABLET ORAL EVERY 6 HOURS PRN
Qty: 60 TABLET | Refills: 0 | Status: SHIPPED | OUTPATIENT
Start: 2024-07-25

## 2024-07-25 RX ORDER — LORAZEPAM 2 MG/ML
0.5 INJECTION INTRAMUSCULAR ONCE
Status: COMPLETED | OUTPATIENT
Start: 2024-07-25 | End: 2024-07-25

## 2024-07-25 RX ORDER — ONDANSETRON 4 MG/1
4 TABLET, FILM COATED ORAL EVERY 6 HOURS
Qty: 15 TABLET | Refills: 0 | Status: SHIPPED | OUTPATIENT
Start: 2024-07-25

## 2024-07-25 RX ADMIN — ACETAMINOPHEN 975 MG: 325 TABLET ORAL at 02:34

## 2024-07-25 RX ADMIN — IOHEXOL 75 ML: 350 INJECTION, SOLUTION INTRAVENOUS at 01:13

## 2024-07-25 RX ADMIN — SODIUM CHLORIDE, POTASSIUM CHLORIDE, SODIUM LACTATE AND CALCIUM CHLORIDE 1000 ML: 600; 310; 30; 20 INJECTION, SOLUTION INTRAVENOUS at 00:06

## 2024-07-25 RX ADMIN — LORAZEPAM 0.5 MG: 2 INJECTION INTRAMUSCULAR; INTRAVENOUS at 01:11

## 2024-07-25 RX ADMIN — ONDANSETRON 4 MG: 2 INJECTION INTRAMUSCULAR; INTRAVENOUS at 00:05

## 2024-07-25 ASSESSMENT — LIFESTYLE VARIABLES
HAVE YOU EVER FELT YOU SHOULD CUT DOWN ON YOUR DRINKING: NO
EVER FELT BAD OR GUILTY ABOUT YOUR DRINKING: NO
TOTAL SCORE: 0
EVER HAD A DRINK FIRST THING IN THE MORNING TO STEADY YOUR NERVES TO GET RID OF A HANGOVER: NO
HAVE PEOPLE ANNOYED YOU BY CRITICIZING YOUR DRINKING: NO

## 2024-07-25 ASSESSMENT — PAIN SCALES - GENERAL: PAINLEVEL_OUTOF10: 2

## 2024-07-25 ASSESSMENT — PAIN DESCRIPTION - PROGRESSION: CLINICAL_PROGRESSION: GRADUALLY IMPROVING

## 2024-07-25 ASSESSMENT — PAIN - FUNCTIONAL ASSESSMENT: PAIN_FUNCTIONAL_ASSESSMENT: 0-10

## 2024-07-25 NOTE — DISCHARGE INSTRUCTIONS
You tested positive for COVID.  Continue supportive care measures.  Take the Paxlovid as prescribed for 5 days, while taking this medication, do not take your atorvastatin or tramadol.  May take Zofran as needed for nausea.  May take Tylenol as needed for headache or if you develop a fever.  Stay hydrated.  Do deep breathing exercises every hour while awake.  Follow-up with your primary care physician as needed.      Follow-up with your surgeon Dr. Jocelyn Atwood Within 1 to 2 days.

## 2024-07-25 NOTE — ED PROVIDER NOTES
Emergency Department Encounter  [facility] EMERGENCY MEDICINE    Patient: Zenaida Morales  MRN: [id number]  : 1965  Date of Evaluation: 2024  ED Provider: JAZZ Sher      Chief Complaint       Chief Complaint   Patient presents with    Nausea     S/p vaginal prolapse and stress incontinence surgery 24, c/o nausea, weakness, headache, abdominal pain      [place]    (Location/Symptom, Timing/Onset, Context/Setting, Quality, Duration, Modifying Factors, Severity) Note limiting factors.   Limitations to History: None   historian: Patient  Records reviewed: EMR inpatient and outpatient notes, Care Everywhere      Zenaida Morales is a 59 y.o. female with past medical history of CHF, HTN and DM, who presents to the emergency department for worsening nausea, HA and pain to incision sites.  On  patient had robotic sacrocolpopexy, posterior repair, perineorrhahpy, and cystoscopy procedure, performed by Dr. Jocelyn Atwood with OB at [facility] . She states since the surgery has had progressively worsening nausea.  She states she became constipated, after utilizing several over-the-counter agents and being disimpacted by daughter, had a bowel movement at 5 AM this morning, however she started to experience tenderness to her incision sites and headache, she states she has not been taking the Dilaudid ibuprofen or Tylenol to prevent from becoming constipated again.  She does report shortness of breath.  She denies fever, chills, chest pain, vomiting, hematuria, urinary retention, or bloody stools.    ROS:     Review of Systems  14 systems reviewed and otherwise acutely negative except as in the [place].          Past History     Past Medical History:   Diagnosis Date    ADHD     Asthma (HHS-HCC)     CHF (congestive heart failure) (Multi)     Per [facility] cardiology note 2018    Delayed emergence from general anesthesia     GERD (gastroesophageal reflux disease)     H/O echocardiogram 2023     Scanned to media (no evidence of dilated cardiomyopathy)    H/O right heart catheterization 02/25/2015    Hyperlipidemia     Hypertension     Lumbago with sciatica     Lumbar spondylosis     Obesity     Orthopnea     Panic disorder     PONV (postoperative nausea and vomiting)     Sleep apnea     unable to tolerate CPAP    Type 2 diabetes mellitus (Multi)     Urge incontinence of urine     Vaginal vault prolapse      Past Surgical History:   Procedure Laterality Date    CARDIAC CATHETERIZATION Right 02/25/2015    COLONOSCOPY      HYSTERECTOMY      UPPER GASTROINTESTINAL ENDOSCOPY       Social History     Socioeconomic History    Marital status: Single   Tobacco Use    Smoking status: Never    Smokeless tobacco: Never   Vaping Use    Vaping status: Never Used   Substance and Sexual Activity    Alcohol use: Yes     Alcohol/week: 2.0 standard drinks of alcohol     Types: 2 Standard drinks or equivalent per week    Drug use: Never    Sexual activity: Defer     Social Determinants of Health     Financial Resource Strain: Low Risk  (7/19/2024)    Overall Financial Resource Strain (CARDIA)     Difficulty of Paying Living Expenses: Not very hard   Transportation Needs: No Transportation Needs (7/19/2024)    PRAPARE - Transportation     Lack of Transportation (Medical): No     Lack of Transportation (Non-Medical): No   Housing Stability: Low Risk  (7/19/2024)    Housing Stability Vital Sign     Unable to Pay for Housing in the Last Year: No     Number of Times Moved in the Last Year: 0     Homeless in the Last Year: No       Medications/Allergies     Previous Medications    ACETAMINOPHEN (TYLENOL) 325 MG TABLET    Take 1 tablet (325 mg) by mouth every 6 hours if needed for mild pain (1 - 3).    ACETAMINOPHEN (TYLENOL) 325 MG TABLET    Take 2 tablets (650 mg) by mouth every 6 hours if needed for mild pain (1 - 3) for up to 14 days.    ATORVASTATIN (LIPITOR) 40 MG TABLET    Take 1 tablet (40 mg) by mouth once daily.     CARVEDILOL (COREG) 6.25 MG TABLET    Take 1 tablet (6.25 mg) by mouth 2 times a day.    CHLORHEXIDINE (PERIDEX) 0.12 % SOLUTION    Swish for 30 seconds and spit 15mL of solution the night before and morning of surgery    DICLOFENAC SODIUM (VOLTAREN) 1 % GEL    Apply 4.5 inches (4 g) topically 4 times a day as needed (arthritis pain).    FEXOFENADINE (ALLEGRA) 180 MG TABLET    Take 1 tablet (180 mg) by mouth every other day.    FUROSEMIDE (LASIX) 40 MG TABLET    Take 1 tablet (40 mg) by mouth if needed (swelling).    HYDROMORPHONE (DILAUDID) 2 MG TABLET    Take 1 tablet (2 mg) by mouth every 6 hours if needed for severe pain (7 - 10).    IBUPROFEN 600 MG TABLET    Take 1 tablet (600 mg) by mouth every 6 hours if needed for mild pain (1 - 3) for up to 14 days.    JARDIANCE 25 MG    Take 1 tablet (25 mg) by mouth once daily.    LORATADINE (CLARITIN) 10 MG TABLET    Take 1 tablet (10 mg) by mouth every other day.    METHYLPHENIDATE (RITALIN) 10 MG TABLET    Take 1 tablet (10 mg) by mouth if needed.    ONDANSETRON ODT (ZOFRAN-ODT) 4 MG DISINTEGRATING TABLET    Take 1 tablet (4 mg) by mouth every 8 hours if needed for nausea or vomiting for up to 14 days.    PANTOPRAZOLE (PROTONIX) 40 MG EC TABLET    Take 1 tablet (40 mg) by mouth once daily in the morning. Take before meals. Do not crush, chew, or split.    POLYETHYLENE GLYCOL (GLYCOLAX, MIRALAX) 17 GRAM PACKET    Take 17 g by mouth once daily for 14 days. Take until you have your first bowel movement    POTASSIUM CHLORIDE (KLOR-CON) 20 MEQ PACKET    Take 20 mEq by mouth if needed (Takes w/ lasix).    TRAMADOL (ULTRAM) 50 MG TABLET    Take 1 tablet (50 mg) by mouth every 6 hours if needed for moderate pain (4 - 6).    VALSARTAN (DIOVAN) 80 MG TABLET    Take 1 tablet (80 mg) by mouth once daily.     Allergies   Allergen Reactions    Cefdinir Anaphylaxis    Penicillins Anaphylaxis    Sulfa-Gyn Anaphylaxis    Codeine Seizure    Minocycline Hives        Physical Exam        ED Triage Vitals [07/24/24 2246]   Temperature Heart Rate Respirations BP   36 °C (96.8 °F) 81 (!) 21 140/78      Pulse Ox Temp Source Heart Rate Source Patient Position   96 % Temporal Monitor Sitting      BP Location FiO2 (%)     Left arm --         Physical Exam  Vitals and nursing note reviewed.   Constitutional:       General: She is in acute distress.      Appearance: She is not toxic-appearing.   HENT:      Head: Normocephalic and atraumatic.      Right Ear: External ear normal.      Left Ear: External ear normal.      Nose: Nose normal.      Mouth/Throat:      Mouth: Mucous membranes are moist.      Pharynx: Oropharynx is clear.   Eyes:      Extraocular Movements: Extraocular movements intact.   Cardiovascular:      Rate and Rhythm: Normal rate and regular rhythm.   Pulmonary:      Effort: Pulmonary effort is normal.      Breath sounds: Normal breath sounds.   Abdominal:      Tenderness: There is no abdominal tenderness. There is no right CVA tenderness, left CVA tenderness, guarding or rebound.   Musculoskeletal:      Cervical back: Normal range of motion and neck supple.   Skin:     General: Skin is warm and dry.      Capillary Refill: Capillary refill takes less than 2 seconds.      Findings: Wound present.   Neurological:      General: No focal deficit present.      Mental Status: She is alert.   Psychiatric:         Mood and Affect: Mood normal.         Behavior: Behavior normal.           Diagnostics   Labs:  Labs Reviewed   CBC WITH AUTO DIFFERENTIAL - Abnormal       Result Value    WBC 7.1      nRBC 0.0      RBC 3.96 (*)     Hemoglobin 11.9 (*)     Hematocrit 36.1      MCV 91      MCH 30.1      MCHC 33.0      RDW 12.2      Platelets 257      Neutrophils % 55.0      Immature Granulocytes %, Automated 0.6      Lymphocytes % 30.0      Monocytes % 8.6      Eosinophils % 5.5      Basophils % 0.3      Neutrophils Absolute 3.88      Immature Granulocytes Absolute, Automated 0.04      Lymphocytes  Absolute 2.12      Monocytes Absolute 0.61      Eosinophils Absolute 0.39      Basophils Absolute 0.02     COMPREHENSIVE METABOLIC PANEL - Abnormal    Glucose 106 (*)     Sodium 139      Potassium 3.6      Chloride 101      Bicarbonate 30      Anion Gap 12      Urea Nitrogen 7      Creatinine 0.52      eGFR >90      Calcium 8.6      Albumin 3.5      Alkaline Phosphatase 72      Total Protein 6.3 (*)     AST 23      Bilirubin, Total 0.8      ALT 33     LIPASE - Abnormal    Lipase 7 (*)     Narrative:     Venipuncture immediately after or during the administration of Metamizole may lead to falsely low results. Testing should be performed immediately prior to Metamizole dosing.   SARS-COV-2 PCR - Abnormal    Coronavirus 2019, PCR Detected (*)     Narrative:     This assay has received FDA Emergency Use Authorization (EUA) and is only authorized for the duration of time that circumstances exist to justify the authorization of the emergency use of in vitro diagnostic tests for the detection of SARS-CoV-2 virus and/or diagnosis of COVID-19 infection under section 564(b)(1) of the Act, 21 U.S.C. 360bbb-3(b)(1). This assay is an in vitro diagnostic nucleic acid amplification test for the qualitative detection of SARS-CoV-2 from nasopharyngeal specimens and has been validated for use at [facility]. Negative results do not preclude COVID-19 infections and should not be used as the sole basis for diagnosis, treatment, or other management decisions.     HUMAN CHORIONIC GONADOTROPIN, SERUM QUANTITATIVE - Normal    HCG, Beta-Quantitative 4      Narrative:      Total HCG measurement is performed using the [employer] Access   Immunoassay which detects intact HCG and free beta HCG subunit.    This test is not indicated for use as a tumor marker.   HCG testing is performed using a different test methodology at [facility] than other [facility]. Direct result comparison   should only  be made within the same method.          Radiographs:  CT chest abdomen pelvis w IV contrast   Final Result   1. Status post hysterectomy, with small amount of fat stranding and   fluid present in the pelvis in the uterine surgical bed.   2. Small bilateral pleural effusions.   3. No abnormal bowel dilatation or inflammatory bowel wall thickening   or other acute abnormality is identified in the abdomen or pelvis.   4. Subcutaneous emphysema in the lower anterior pelvic coil is likely   postsurgical.   5. Non dependent gas in the bladder may be secondary to recent   instrumentation. No bladder wall thickening is present.        MACRO:   None.        Signed by: Jim Cantu 7/25/2024 1:46 AM   Dictation workstation:   [id number]           Procedures:       EKG: interpreted by this provider  Time: 0001  Indication: Nausea  Rate: 71  Rhythm: Sinus rhythm with first-degree AV block  Axis: deviation to Left axis  QRS interval: 110 ms  ST Segment: No ST elevation  Comparison to Prior: Possible anteriorlateral infarct on previous EKG 7/16/2024      Medical decision making   In brief, Zenaida Morales is a 59 y.o. female who presented to the emergency department for worsening nausea after bladder sling surgery on Friday and 4-hour history of gradual onset headache  Upon examination Abdomen is soft, nondistended, non tender, normal active bowel sounds.  No neurological deficits.  Differential diagnoses extensive but include:  1.  Postop complication  2.  PE  3.  COVID  Medical work up includes EKG, labs and imaging.  EKG shows sinus rhythm with first-degree AV block, rate 71 bpm, no acute ischemic changes.  Labs show no leukocytosis, electrolyte derangement, NATALY or hepatic abnormalities.  Lipase is low at 7  COVID detected  Urinalysis shows no infection.  CT chest abdomen pelvis shows small bilateral pleural effusions, subcutaneous emphysema in the lower anterior pelvic coil and status post hysterectomy with small  amount of fat stranding and fluid present in the pelvis in the ED [facility] surgical bed.  Low suspicion for ACS or PE, EKG is not indicative, CT chest abdomen pelvis shows no evidence.  Patient reports improvement of nausea and headache 0/10.  Case reviewed with Dr. Stephan Peoples  Spoke with Dr. Simmons with OB concerning patient's CT results, no under concerns on CT scan, patient does not require any immediate intervention.  Patient educated to follow-up with her surgeon.  Patient educated on supportive care measures for COVID and to stay hydrated.  Prescribed Paxlovid, educated to hold atorvastatin and tramadol while taking for 5 days. Patient verbalized understanding.  Prescribe Zofran and Tylenol.    I discussed the differential, results and discharge plan with the patient.  Patient educated on medication administration.  I emphasized the importance of follow-up with the physician I referred them to in the timeframe recommended. I explained reasons for the patient to return to the emergency department. Questions were addressed. They understand return precautions and discharge instructions. The patient  expressed understanding.            ED Course as of 07/25/24 0335   Thu Jul 25, 2024   0001 Zofran, Tylenol and LR 1 L bolus ordered. [ED]   0031 CBC and Auto Differential(!)  No leukocytosis, hemoglobin low at 11.9, no acute anemia or thrombocytopenia. [ED]   0032 Comprehensive metabolic panel(!)  Glucose elevated at 106, otherwise no electrolyte derangement, NATALY or hepatic abnormalities. [ED]   0036 Lipase(!)  Low at 7. [ED]   0041 Sars-CoV-2 PCR(!)  Dectected  Pt informed [ED]   0043 hCG, quantitative, pregnancy  Not detected [ED]   0111 Patient requesting medication for anxiety during CT.  Ativan 0.5 mg IV ordered [ED]   0122 This is a 59 years old female patient presented to the emergency department with a chief complaint of nausea, abdominal pain, stated that she had a bladder sling surgery on Friday.  Stated  that she had a stool impaction cleaned using narcotic for the postoperative pain.  It was resolved with and followed by diarrhea.  Reported nausea, reported 4 out of 10 headache, denies any significant abdominal pain and reported it is just the discomfort.  Denies urinary symptoms.  Denies fever, chills or bodyaches.    Review of system: As stated above in the HPI section.    Physical exam revealed a 59 years old female patient does not appear to be in distress.  On cardiopulmonary exam: Normal S1, S2, no gallop, or murmur, no wheezes, rales, rhonchi or any signs of respiratory distress  Abdominal exam: Abdomen soft, nondistended, nontender, no guarding, rigidity, rebound  Skin exam: Warm and dry  Neurologic exam: Alert oriented x 4, no acute neurologic deficit.    Will obtain basic labs, and CT abdomen pelvis with IV contrast dye.  Patient required Ativan during the CT scan for anxiety.  Received Tylenol for the pain.  Tested positive for COVID-19. [ME]   0213 CT chest abdomen pelvis w IV contrast  1. Status post hysterectomy, with small amount of fat stranding and  fluid present in the pelvis in the uterine surgical bed.  2. Small bilateral pleural effusions.  3. No abnormal bowel dilatation or inflammatory bowel wall thickening  or other acute abnormality is identified in the abdomen or pelvis.  4. Subcutaneous emphysema in the lower anterior pelvic coil is likely  postsurgical.  5. Non dependent gas in the bladder may be secondary to recent  instrumentation. No bladder wall thickening is present.    OB consulted [ED]   0215 Urinalysis with Reflex Culture and Microscopic(!)  Shows no infection [ED]   0301 Spoke with Dr. Ricci with OB concerning patients CT, stated fluid along the uterine surgical bed normal of post surguery, pt does require any immediate interventions. Will advised patient to follow up with Dr. Jocelyn Atwood. [ED]      ED Course User Index  [ED] Radha Orellana, APRN-CNP  [ME] Stephan Manley,  "DO         Diagnoses as of 07/25/24 0335   Nausea   COVID      Visit Vitals  /78 (BP Location: Left arm, Patient Position: Sitting)   Pulse 81   Temp 36 °C (96.8 °F) (Temporal)   Resp (!) 21   Ht 1.676 m (5' 6\")   Wt 79.4 kg (175 lb)   SpO2 96%   BMI 28.25 kg/m²   OB Status Menopausal   Smoking Status Never   BSA 1.92 m²       Medications   acetaminophen (Tylenol) tablet 975 mg (has no administration in time range)   ondansetron (Zofran) injection 4 mg (has no administration in time range)   lactated Ringer's bolus 1,000 mL (has no administration in time range)       Plan of care discussed,   Case reviewed with Dr. Stephan Peoples      Final Impression    COVID  Nausea    DISPOSITION  Disposition: Discharge  Patient condition is: Stable    Comment: Please note this report has been produced using speech recognition software and may contain errors related to that system including errors in grammar, punctuation, and spelling, as well as words and phrases that may be inappropriate.  If there are any questions or concerns please feel free to contact the dictating provider for clarification.    JAZZ Sher  [facility]  Emergency department     JAZZ Sher  07/25/24 0342    "

## 2024-07-25 NOTE — TELEPHONE ENCOUNTER
Message received on my voicemail from patient's daughter, Sona. She states that they went to ER last night, and her mom (patient) was given some IV fluids due to dehydration. She was also tested for Covid and found to be positive. The rest of the family was tested, and they were all positive for Covid. Call back to Sona; she says that the IV fluids helped, and she is picking up prescriptions for paxlovid today. Told to call me back for any questions or issues.

## 2024-07-26 LAB — BACTERIA UR CULT: NORMAL

## 2024-08-05 ENCOUNTER — OFFICE VISIT (OUTPATIENT)
Dept: OBSTETRICS AND GYNECOLOGY | Facility: CLINIC | Age: 59
End: 2024-08-05
Payer: COMMERCIAL

## 2024-08-05 VITALS
DIASTOLIC BLOOD PRESSURE: 85 MMHG | WEIGHT: 180 LBS | HEIGHT: 66 IN | SYSTOLIC BLOOD PRESSURE: 129 MMHG | BODY MASS INDEX: 28.93 KG/M2 | HEART RATE: 97 BPM

## 2024-08-05 DIAGNOSIS — N95.2 VAGINAL ATROPHY: ICD-10-CM

## 2024-08-05 DIAGNOSIS — R31.29 HEMATURIA, MICROSCOPIC: ICD-10-CM

## 2024-08-05 DIAGNOSIS — N39.0 ACUTE LOWER UTI: Primary | ICD-10-CM

## 2024-08-05 LAB
POC APPEARANCE, URINE: ABNORMAL
POC BILIRUBIN, URINE: ABNORMAL
POC BLOOD, URINE: ABNORMAL
POC COLOR, URINE: YELLOW
POC GLUCOSE, URINE: ABNORMAL MG/DL
POC KETONES, URINE: NEGATIVE MG/DL
POC LEUKOCYTES, URINE: NEGATIVE
POC NITRITE,URINE: NEGATIVE
POC PH, URINE: 5.5 PH
POC PROTEIN, URINE: ABNORMAL MG/DL
POC SPECIFIC GRAVITY, URINE: >=1.03
POC UROBILINOGEN, URINE: 0.2 EU/DL

## 2024-08-05 PROCEDURE — 81003 URINALYSIS AUTO W/O SCOPE: CPT | Performed by: OBSTETRICS & GYNECOLOGY

## 2024-08-05 PROCEDURE — 1036F TOBACCO NON-USER: CPT | Performed by: OBSTETRICS & GYNECOLOGY

## 2024-08-05 PROCEDURE — 3008F BODY MASS INDEX DOCD: CPT | Performed by: OBSTETRICS & GYNECOLOGY

## 2024-08-05 PROCEDURE — 99211 OFF/OP EST MAY X REQ PHY/QHP: CPT | Performed by: OBSTETRICS & GYNECOLOGY

## 2024-08-05 PROCEDURE — 87086 URINE CULTURE/COLONY COUNT: CPT | Performed by: OBSTETRICS & GYNECOLOGY

## 2024-08-05 RX ORDER — ESTRADIOL 0.1 MG/G
1 CREAM VAGINAL DAILY
Qty: 42.5 G | Refills: 1 | Status: SHIPPED | OUTPATIENT
Start: 2024-08-05 | End: 2026-03-23

## 2024-08-05 ASSESSMENT — PAIN SCALES - GENERAL: PAINLEVEL: 0-NO PAIN

## 2024-08-05 NOTE — PROGRESS NOTES
Urogynecology  Provider:  Jocelyn Atwood MD  [phone]              ASSESSMENT AND PLAN:   59 y.o. female presenting in postoperative follow up. Co morbidities: ADHD, DM, back pain and herniated discs.          Diagnoses:   #1 Apical & anterior vaginal wall prolapse (resolved)   #2 MILY (resolved)      Plan:   1. Apical & anterior vaginal wall prolapse, MILY, postop   - Urine dip with blood likely from the healing process. Sent for culture.   - 7/19/24 Robot-assisted sacrocolpopexy, Bilateral salpingectomy, Midurethral sling, Posterior repair, Perineorrhaphy, Cystourethroscopy   - No lifting more than 10 lbs and nothing inside the vagina.   - Start tv estrogen cream nightly until next visit to help with healing.   - She is allergic to both cephalosporins and sulfa, so will hold off on starting abx.   - Overall, she is doing well. She is about 12 days out from COVID diagnosis.   - All questions answered.      Follow-up in 1 month with Cecy SCHULZ.     Scribe Attestation:   I, Fouzia Pacheco, am scribing for virtually, and in the presence of Jocelyn Atwood MD on 8/5/24 at 3:19 PM.     Agree with above. I Dr. Atwood, personally performed the services described in the documentation which was scribed virtually and confirm it is both complete and accurate.  Jocelyn Atwood MD        Problem List Items Addressed This Visit    None          I spent a total of eConsult Time: 20 minutes in face to face and non face to face time.        Jocelyn Atwood MD        HISTORY OF PRESENT ILLNESS:   Zenaida Morales is a 59 y.o. female who presents in postoperative follow up.       Record Review:   - 7/19/24 Robot-assisted sacrocolpopexy, Bilateral salpingectomy, Midurethral sling, Posterior repair, Perineorrhaphy, Cystourethroscopy     Postop Symptoms:  - She did have COVID immediately after surgery, but is now feeling better.   - Endorses soreness around her incisions, especially around her navel at  times.   - She also feels a stitch around the vagina.   - Pt saw blood on her panty liner. It was darker brown and now is a pale color.   - Denies MILY.   - Not using tv estrogen.     Past Medical History:      Past Medical History:   Diagnosis Date    ADHD     Asthma ([facility])     CHF (congestive heart failure) (Multi)     Per [facility] cardiology note 2018    Delayed emergence from general anesthesia     GERD (gastroesophageal reflux disease)     H/O echocardiogram 12/19/2023    Scanned to media (no evidence of dilated cardiomyopathy)    H/O right heart catheterization 02/25/2015    Hyperlipidemia     Hypertension     Lumbago with sciatica     Lumbar spondylosis     Obesity     Orthopnea     Panic disorder     PONV (postoperative nausea and vomiting)     Sleep apnea     unable to tolerate CPAP    Type 2 diabetes mellitus (Multi)     Urge incontinence of urine     Vaginal vault prolapse           Past Surgical History:       Past Surgical History:   Procedure Laterality Date    CARDIAC CATHETERIZATION Right 02/25/2015    COLONOSCOPY      HYSTERECTOMY      UPPER GASTROINTESTINAL ENDOSCOPY           Medications:       Prior to Admission medications    Medication Sig Start Date End Date Taking? Authorizing Provider   acetaminophen (TylenoL) 325 mg tablet Take 2 tablets (650 mg) by mouth every 6 hours if needed for mild pain (1 - 3). 7/25/24   Radha Orellana, APRN-CNP   atorvastatin (Lipitor) 40 mg tablet Take 1 tablet (40 mg) by mouth once daily.    Historical Provider, MD   carvedilol (Coreg) 6.25 mg tablet Take 1 tablet (6.25 mg) by mouth 2 times a day.    Historical Provider, MD   chlorhexidine (Peridex) 0.12 % solution Swish for 30 seconds and spit 15mL of solution the night before and morning of surgery 7/16/24   Radha Martin PA-C   diclofenac sodium (Voltaren) 1 % gel Apply 4.5 inches (4 g) topically 4 times a day as needed (arthritis pain).    Historical Provider, MD   fexofenadine (Allegra) 180 mg tablet Take 1  tablet (180 mg) by mouth every other day.    Historical Provider, MD   furosemide (Lasix) 40 mg tablet Take 1 tablet (40 mg) by mouth if needed (swelling).    Historical Provider, MD   HYDROmorphone (Dilaudid) 2 mg tablet Take 1 tablet (2 mg) by mouth every 6 hours if needed for severe pain (7 - 10). 7/20/24   Evelyn Alatorre MD   ibuprofen 600 mg tablet Take 1 tablet (600 mg) by mouth every 6 hours if needed for mild pain (1 - 3) for up to 14 days. 7/19/24 8/2/24  Jayleen Garza MD   Jardiance 25 mg Take 1 tablet (25 mg) by mouth once daily.    Historical Provider, MD   loratadine (Claritin) 10 mg tablet Take 1 tablet (10 mg) by mouth every other day.    Historical Provider, MD   methylphenidate (Ritalin) 10 mg tablet Take 1 tablet (10 mg) by mouth if needed. 5/7/24   Historical ProviderMD lamatrelvir-ritonavir (PAXLOVID) 300 mg (150 mg x 2)-100 mg tablet therapy pack Take 3 tablets by mouth 2 times a day for 5 days. Follow the instructions on the package 7/25/24 7/30/24  JAZZ Sher   ondansetron (Zofran) 4 mg tablet Take 1 tablet (4 mg) by mouth every 6 hours. 7/25/24   NIDIA Sher-CNP   ondansetron ODT (Zofran-ODT) 4 mg disintegrating tablet Take 1 tablet (4 mg) by mouth every 8 hours if needed for nausea or vomiting for up to 14 days. 7/19/24 8/2/24  Jayleen Garza MD   pantoprazole (ProtoNix) 40 mg EC tablet Take 1 tablet (40 mg) by mouth once daily in the morning. Take before meals. Do not crush, chew, or split.    Historical Provider, MD   polyethylene glycol (Glycolax, Miralax) 17 gram packet Take 17 g by mouth once daily for 14 days. Take until you have your first bowel movement 7/19/24 8/2/24  Jayleen Garza MD   potassium chloride (Klor-Con) 20 mEq packet Take 20 mEq by mouth if needed (Takes w/ lasix).    Historical Provider, MD   traMADol (Ultram) 50 mg tablet Take 1 tablet (50 mg) by mouth every 6 hours if needed for moderate pain (4 - 6). 7/19/24   Jayleen MONROY  MD Kayla   valsartan (Diovan) 80 mg tablet Take 1 tablet (80 mg) by mouth once daily.    Historical Provider, MD GARCIA  Review of Systems     Blood, Urine   Date Value Ref Range Status   07/25/2024 0.03 (TRACE) (A) NEGATIVE Final     Poc Nitrite, Urine   Date Value Ref Range Status   04/15/2024 NEGATIVE NEGATIVE Final     Nitrite, Urine   Date Value Ref Range Status   07/25/2024 NEGATIVE NEGATIVE Final     Urobilinogen, Urine   Date Value Ref Range Status   07/25/2024 Normal Normal mg/dL Final         PHYSICAL EXAM:      There were no vitals taken for this visit.     No LMP recorded. (Menstrual status: Menopausal).      Declines chaperone for physical exam.      Well developed, well nourished, in no apparent distress.   Neurologic/Psychiatric:  Awake, Alert and Oriented times 3.  Affect normal.     Abdomen: Abdominal incisions are well healed.     GENITAL/URINARY:       External Genitalia:  The patient has normal appearing external genitalia, normal skenes and bartholins glands, and a normal hair distribution.  Her vulva is without lesions, erythema or discharge.  It is non-tender with appropriate sensation.     Urethral Meatus:  Size normal, Location normal, Lesions absent, Prolapse absent,      Urethra:  Fullness absent, Masses absent,      Bladder:  Fullness absent, Masses absent, Tenderness absent,      Vagina:  General appearance normal, Lesions absent, On speculum exam she has significant tenderness, but incisions appear to be well healing. There is a little bit of very light pink discharge. A stitch was trimmed.     Cervix: Normal, no discharge.       Data and DIAGNOSTIC STUDIES REVIEWED   ECG 12 lead    Result Date: 7/25/2024  Sinus rhythm with 1st degree AV block Left axis deviation Low voltage QRS Possible Anterolateral infarct (cited on or before 16-JUL-2024) Abnormal ECG When compared with ECG of 16-JUL-2024 07:43, Fusion complexes are no longer Present Questionable change in initial forces of  Lateral leads QT has shortened    CT chest abdomen pelvis w IV contrast    Result Date: 7/25/2024  Interpreted By:  Jim Cantu, STUDY: CT CHEST ABDOMEN PELVIS W IV CONTRAST;  7/25/2024 1:22 am   INDICATION: Signs/Symptoms:Post op Abdominal pain, SOB.   COMPARISON: None.   ACCESSION NUMBER(S): [id number]   ORDERING CLINICIAN: LOIS ROMERO   TECHNIQUE: Contiguous axial images of the chest, abdomen, and pelvis were obtained after the intravenous administration of 75 mL Omnipaque 350 contrast. Coronal and sagittal reformatted images were reconstructed from the axial data.   FINDINGS: CT CHEST:   MEDIASTINUM AND LYMPH NODES:  The esophagus appears within normal limits.  No enlarged intrathoracic or axillary lymph nodes by imaging criteria. No pneumomediastinum.   VESSELS:  Normal caliber aorta without dissection. No aortic atherosclerosis.   HEART: Normal size.  Minimal coronary artery calcifications. Trace pericardial effusion.   LUNG, AIRWAYS, PLEURA:  Small bilateral pleural effusions with some atelectatic changes are present in the lower lobes bilaterally with some associated atelectatic changes in the lower lobes. No consolidation or pneumothorax is present.   CHEST WALL SOFT TISSUES: Cutaneous tissues of the chest wall do not demonstrate any acute abnormality.   OSSEOUS STRUCTURES: No acute osseous abnormality. Mild degenerative changes are present in the thoracic spine without evidence of compression fractures or high-grade stenosis.     CT ABDOMEN/PELVIS:   ABDOMINAL WALL: Cutaneous air is present in the soft tissues of the lower pelvis, likely postsurgical. No acute abnormalities are evident.   LIVER: No significant parenchymal abnormality. Portal vein is unremarkable in appearance.   BILE DUCTS: No significant intrahepatic or extrahepatic dilatation.   GALLBLADDER: No gallbladder wall thickening or pericholecystic stranding is identified.   PANCREAS: No pancreatic ductal dilatation or  peripancreatic stranding is present.   SPLEEN: Spleen is unremarkable in appearance.   ADRENALS: No acute adrenal abnormality is present.   KIDNEYS, URETERS, BLADDER: Kidneys are symmetric in size and enhancement without evidence of hydronephrosis or nephrolithiasis present bilaterally. Visualized upper ureters are unremarkable.   No bladder wall thickening is present. Small amount of non dependent air is present in the bladder.   REPRODUCTIVE ORGANS: Uterus is surgically absent. There some mild fat stranding with trace fluid present in the cul-de-sac of the pelvis, likely postsurgical. No thick-walled collections are identified.   VESSELS: No significant abnormality. No acute vascular abnormality is present.   RETROPERITONEUM/LYMPH NODES: No enlarged lymph nodes. No acute retroperitoneal abnormality.   BOWEL/MESENTERY/PERITONEUM: Moderate solid stool burden is present in the proximal large bowel. No inflammatory large bowel wall thickening or abnormal bowel dilatation is evident. Appendix is not definitely identified, although no secondary signs of acute appendicitis are present in the its expected location in the right lower quadrant.   Trace fluid is present in the pelvis in the uterine surgical bed. No thick-walled collections are identified.     MUSCULOSKELETAL: No acute osseous abnormality.       1. Status post hysterectomy, with small amount of fat stranding and fluid present in the pelvis in the uterine surgical bed. 2. Small bilateral pleural effusions. 3. No abnormal bowel dilatation or inflammatory bowel wall thickening or other acute abnormality is identified in the abdomen or pelvis. 4. Subcutaneous emphysema in the lower anterior pelvic coil is likely postsurgical. 5. Non dependent gas in the bladder may be secondary to recent instrumentation. No bladder wall thickening is present.   MACRO: None.   Signed by: Jim Cantu 7/25/2024 1:46 AM Dictation workstation:   [id number]    ECG 12 lead  (Clinic Performed)    Result Date: 7/16/2024  Sinus rhythm with 1st degree AV block with Fusion complexes Left axis deviation Pulmonary disease pattern Septal infarct , age undetermined Abnormal ECG No previous ECGs available Confirmed by Luis Selby ([id number]) on 7/16/2024 10:00:07 AM     Lab Results   Component Value Date    URINECULTURE No significant growth 07/25/2024      Lab Results   Component Value Date    GLUCOSE 106 (H) 07/24/2024    CALCIUM 8.6 07/24/2024     07/24/2024    K 3.6 07/24/2024    CO2 30 07/24/2024     07/24/2024    BUN 7 07/24/2024    CREATININE 0.52 07/24/2024     Lab Results   Component Value Date    WBC 7.1 07/24/2024    HGB 11.9 (L) 07/24/2024    HCT 36.1 07/24/2024    MCV 91 07/24/2024     07/24/2024          Jocelyn Atwood MD

## 2024-08-07 LAB — BACTERIA UR CULT: NORMAL

## 2024-08-09 ENCOUNTER — TELEPHONE (OUTPATIENT)
Dept: OBSTETRICS AND GYNECOLOGY | Facility: CLINIC | Age: 59
End: 2024-08-09
Payer: COMMERCIAL

## 2024-08-09 DIAGNOSIS — N39.0 ACUTE LOWER UTI: Primary | ICD-10-CM

## 2024-08-09 NOTE — TELEPHONE ENCOUNTER
Pt with blood in urine and flank pain.  Urine culture ordered.  Diagnosed with COVID 10 days ago.  2 weeks postop.  Jocelyn Atwood MD

## 2024-08-09 NOTE — TELEPHONE ENCOUNTER
Patient calling with complaint of right side back pain and blood in urine when voiding. Also has small amount of blood on pad. Information sent to Dr Atwood. She ordered urine culture. Patient informed and will go later today or tomorrow.

## 2024-08-14 ENCOUNTER — LAB (OUTPATIENT)
Dept: LAB | Facility: LAB | Age: 59
End: 2024-08-14
Payer: COMMERCIAL

## 2024-08-14 DIAGNOSIS — N39.0 ACUTE LOWER UTI: ICD-10-CM

## 2024-08-14 PROCEDURE — 87086 URINE CULTURE/COLONY COUNT: CPT

## 2024-08-15 ENCOUNTER — TELEPHONE (OUTPATIENT)
Dept: OBSTETRICS AND GYNECOLOGY | Facility: CLINIC | Age: 59
End: 2024-08-15
Payer: COMMERCIAL

## 2024-08-15 LAB — BACTERIA UR CULT: NORMAL

## 2024-08-24 LAB
ATRIAL RATE: 71 BPM
P AXIS: 47 DEGREES
P OFFSET: 156 MS
P ONSET: 102 MS
PR INTERVAL: 228 MS
Q ONSET: 216 MS
QRS COUNT: 11 BEATS
QRS DURATION: 110 MS
QT INTERVAL: 396 MS
QTC CALCULATION(BAZETT): 430 MS
QTC FREDERICIA: 419 MS
R AXIS: -50 DEGREES
T AXIS: 48 DEGREES
T OFFSET: 414 MS
VENTRICULAR RATE: 71 BPM

## 2024-09-04 ENCOUNTER — OFFICE VISIT (OUTPATIENT)
Dept: OBSTETRICS AND GYNECOLOGY | Facility: CLINIC | Age: 59
End: 2024-09-04
Payer: COMMERCIAL

## 2024-09-04 VITALS — DIASTOLIC BLOOD PRESSURE: 91 MMHG | BODY MASS INDEX: 28.57 KG/M2 | SYSTOLIC BLOOD PRESSURE: 149 MMHG | WEIGHT: 177 LBS

## 2024-09-04 DIAGNOSIS — Z98.890 POST-OPERATIVE STATE: Primary | ICD-10-CM

## 2024-09-04 DIAGNOSIS — N95.2 VAGINAL ATROPHY: ICD-10-CM

## 2024-09-04 DIAGNOSIS — N81.9 VAGINAL VAULT PROLAPSE: ICD-10-CM

## 2024-09-04 DIAGNOSIS — N39.3 SUI (STRESS URINARY INCONTINENCE, FEMALE): ICD-10-CM

## 2024-09-04 PROCEDURE — 99211 OFF/OP EST MAY X REQ PHY/QHP: CPT | Performed by: NURSE PRACTITIONER

## 2024-09-04 RX ORDER — ESTRADIOL 0.1 MG/G
1 CREAM VAGINAL 2 TIMES WEEKLY
Qty: 42.5 G | Refills: 1 | Status: SHIPPED | OUTPATIENT
Start: 2024-09-05 | End: 2025-09-05

## 2024-09-04 ASSESSMENT — PAIN SCALES - GENERAL: PAINLEVEL: 3

## 2024-09-04 NOTE — PROGRESS NOTES
Internal History:  On 7/19/2024 she underwent a robot-assisted sacrocolpopexy, Bilateral salpingectomy, Midurethral sling, Posterior repair, Perineorrhaphy, Cystourethroscopy .  She had Covid after her surgery and was dealing with some coughing but things had improved.    Subjective   Zenaida Morales is a 59 y.o. female who presents to the clinic 7 weeks status post for robot-assisted sacrocolpopexy, Bilateral salpingectomy, Midurethral sling, Posterior repair, Perineorrhaphy, Cystourethroscopy . Eating a regular diet without difficulty. Bowel movements are normal. Pain is controlled without any medications.  Noted that the umbilical incision is causing her pain.  When she yells or coughs she feels a sharp shooting pain there.  She has not had intercourse since her surgery but noted that she is still sore and not ready to restart intercourse.  She is still wearing a pad and hasn't had any discharge in 3 days.    Objective   BP (!) 149/91   Wt 80.3 kg (177 lb)   BMI 28.57 kg/m²   General:  alert and oriented, in no acute distress   Abdomen: soft, bowel sounds active, non-tender, all healed well but umbilicus is still sore    Incision:   healing well, no drainage, no erythema, no hernia, no seroma, no swelling, well approximated, no dehiscence, incision well approximated       Assessment/Plan    Doing well postoperatively.  Operative findings again reviewed. Pathology report discussed.    1. Continue any current medications.  2. Discussed ways to help the pain she is experiencing such as heat compresses to calm the inflammation and pelvic floor physical therapy.  3. Refilled Estradiol 0.01% cream 2 times weekly.  4. Recommend she wait to lift her mother for a couple more weeks since she is still experencing tenderness.  5. Discussed waiting another week before restarting intercourse and when she does using plenty of lubrication.  6. Follow up: 1  month  to reassess tenderness with Dr. Angelic Garza,  APRN-CNP

## 2024-09-05 ENCOUNTER — APPOINTMENT (OUTPATIENT)
Dept: OBSTETRICS AND GYNECOLOGY | Facility: CLINIC | Age: 59
End: 2024-09-05
Payer: COMMERCIAL

## 2024-10-17 ENCOUNTER — APPOINTMENT (OUTPATIENT)
Dept: OBSTETRICS AND GYNECOLOGY | Facility: CLINIC | Age: 59
End: 2024-10-17
Payer: COMMERCIAL

## 2024-11-08 DIAGNOSIS — N95.2 VAGINAL ATROPHY: ICD-10-CM

## 2024-11-08 RX ORDER — ESTRADIOL 0.1 MG/G
1 CREAM VAGINAL 2 TIMES WEEKLY
Qty: 42.5 G | Refills: 2 | Status: SHIPPED | OUTPATIENT
Start: 2024-11-11 | End: 2026-01-31

## 2024-11-08 NOTE — TELEPHONE ENCOUNTER
Request received for   Requested Prescriptions     Pending Prescriptions Disp Refills    estradiol (Estrace) 0.01 % (0.1 mg/gram) vaginal cream 42.5 g 1     Sig: Insert 0.25 Applicatorfuls (1 g) into the vagina 2 times a week.       Zenaida Morales was last seen 9/4/2024 and has an appt for 11/11/2024.

## 2024-11-11 ENCOUNTER — OFFICE VISIT (OUTPATIENT)
Dept: OBSTETRICS AND GYNECOLOGY | Facility: CLINIC | Age: 59
End: 2024-11-11
Payer: COMMERCIAL

## 2024-11-11 VITALS
WEIGHT: 162 LBS | DIASTOLIC BLOOD PRESSURE: 83 MMHG | SYSTOLIC BLOOD PRESSURE: 121 MMHG | HEIGHT: 66 IN | BODY MASS INDEX: 26.03 KG/M2

## 2024-11-11 DIAGNOSIS — N39.3 STRESS INCONTINENCE: Primary | ICD-10-CM

## 2024-11-11 DIAGNOSIS — R10.33 UMBILICAL PAIN: ICD-10-CM

## 2024-11-11 DIAGNOSIS — R10.2 PELVIC PAIN IN FEMALE: ICD-10-CM

## 2024-11-11 PROCEDURE — 51798 US URINE CAPACITY MEASURE: CPT | Performed by: OBSTETRICS & GYNECOLOGY

## 2024-11-11 PROCEDURE — 1036F TOBACCO NON-USER: CPT | Performed by: OBSTETRICS & GYNECOLOGY

## 2024-11-11 PROCEDURE — 99213 OFFICE O/P EST LOW 20 MIN: CPT | Performed by: OBSTETRICS & GYNECOLOGY

## 2024-11-11 PROCEDURE — 3008F BODY MASS INDEX DOCD: CPT | Performed by: OBSTETRICS & GYNECOLOGY

## 2024-11-11 RX ORDER — TIRZEPATIDE 5 MG/.5ML
5 INJECTION, SOLUTION SUBCUTANEOUS WEEKLY
COMMUNITY

## 2024-11-11 RX ORDER — FLUTICASONE FUROATE AND VILANTEROL 100; 25 UG/1; UG/1
1 POWDER RESPIRATORY (INHALATION) DAILY
COMMUNITY

## 2024-11-11 ASSESSMENT — ENCOUNTER SYMPTOMS
ENDOCRINE NEGATIVE: 1
PSYCHIATRIC NEGATIVE: 1
RESPIRATORY NEGATIVE: 1
NEUROLOGICAL NEGATIVE: 1
CONSTITUTIONAL NEGATIVE: 1
MUSCULOSKELETAL NEGATIVE: 1
EYES NEGATIVE: 1
CARDIOVASCULAR NEGATIVE: 1

## 2024-11-11 NOTE — PROGRESS NOTES
Urogynecology  Provider:  Jocelyn Atwood MD  [phone]              ASSESSMENT AND PLAN:   59-year-old female presenting in post-op follow-up. Comorbidities include: ADHD, DM, back pain and herniated discs.    Diagnoses:  #1 Apical and anterior vaginal wall prolapse (resolved)  #2 Stress incontinence (resolved)  #3 Myofascial pelvic pain  #4 Possible umbilical hernia    Plan:  Apical & anterior vaginal wall prolapse, MILY, post-op   - 7/19/24 Robot-assisted sacrocolpopexy, Bilateral salpingectomy, Midurethral sling, Posterior repair, Perineorrhaphy, Cystourethroscopy.  - On exam, her abdominal incisions are well-healed.    2. Myofascial pelvic pain, possible umbilical hernia  - On exam, at the 12 o'clock position on the umbilicus, we felt the smallest bit of defect and were able to reproduce the pain that she complains of.  > She may have a small umbilical hernia at this site.  - She has a diffusely short and tight pelvic floor which is significantly high-toned, which is consistent with a significant trigger point of levator ani and iliococcygeus bilaterally.  - Discussed that we think she has MPP and would benefit from PFPT. She is amenable.  > PFPT requisition sent today and gave her the list of local physical therapists with their corresponding locations/contact information.  - Ordered CT scan to see if there's a defect at the level of the umbilicus.  > If so, we would have her see Dr. Snyder in general surgery, potentially for hernia repair. She is amenable.    Follow-up with Dr. Atwood virtually in 4 months to see how she's doing with PFPT and let her know about the results of the CT.     Scribe Attestation  By signing my name below, I, Kassi Sommers, attest that this documentation has been prepared under the direction and in the presence of Jocelyn Atwood MD on 11/11/2024 at 5:56 PM.    Agree with above. I Dr. Atwood, personally performed the services described in the documentation which  was scribed virtually and confirm it is both complete and accurate.  Jocelyn Atwood MD      Problem List Items Addressed This Visit    None          I spent a total of eConsult Time: 28  minutes in face to face and non face to face time.        Jocelyn Atwood MD        HISTORY OF PRESENT ILLNESS:     Last visit 9/2024  -  7/19/2024 she underwent a robot-assisted sacrocolpopexy, Bilateral salpingectomy, Midurethral sling, Posterior repair, Perineorrhaphy, Cystourethroscopy .     Doing well postoperatively.  Operative findings again reviewed. Pathology report discussed.     1. Continue any current medications.  2. Discussed ways to help the pain she is experiencing such as heat compresses to calm the inflammation and pelvic floor physical therapy.  3. Refilled Estradiol 0.01% cream 2 times weekly.  4. Recommend she wait to lift her mother for a couple more weeks since she is still experencing tenderness.  5. Discussed waiting another week before restarting intercourse and when she does using plenty of lubrication.  6. Follow up: 1  month  to reassess tenderness with Dr. Atwood    Here for final postop check.        Post-op Symptoms:  - She tried to be sexually active twice post-op, and both times she had significant pain where she had to abort the experience.  - She reports persistent occasional shooting pain in the superior aspect of her umbilicus.  - She is in relatively good spirits and has returned to work.  - She has never undergone PFPT.           Past Medical History:      Past Medical History:   Diagnosis Date    ADHD     Asthma ([facility])     CHF (congestive heart failure) (Multi)     Per [facility] cardiology note 2018    Delayed emergence from general anesthesia     GERD (gastroesophageal reflux disease)     H/O echocardiogram 12/19/2023    Scanned to media (no evidence of dilated cardiomyopathy)    H/O right heart catheterization 02/25/2015    Hyperlipidemia     Hypertension     Lumbago with sciatica      Lumbar spondylosis     Obesity     Orthopnea     Panic disorder     PONV (postoperative nausea and vomiting)     Sleep apnea     unable to tolerate CPAP    Type 2 diabetes mellitus (Multi)     Urge incontinence of urine     Vaginal vault prolapse           Past Surgical History:       Past Surgical History:   Procedure Laterality Date    CARDIAC CATHETERIZATION Right 02/25/2015    COLONOSCOPY      HYSTERECTOMY      UPPER GASTROINTESTINAL ENDOSCOPY           Medications:       Prior to Admission medications    Medication Sig Start Date End Date Taking? Authorizing Provider   acetaminophen (TylenoL) 325 mg tablet Take 2 tablets (650 mg) by mouth every 6 hours if needed for mild pain (1 - 3). 7/25/24   Radha Orellana APRN-CNP   atorvastatin (Lipitor) 40 mg tablet Take 1 tablet (40 mg) by mouth once daily.    Historical Provider, MD   carvedilol (Coreg) 6.25 mg tablet Take 1 tablet (6.25 mg) by mouth 2 times a day.    Historical Provider, MD   diclofenac sodium (Voltaren) 1 % gel Apply 4.5 inches (4 g) topically 4 times a day as needed (arthritis pain).    Historical Provider, MD   estradiol (Estrace) 0.01 % (0.1 mg/gram) vaginal cream Insert 0.25 Applicatorfuls (1 g) into the vagina 2 times a week. 11/11/24 1/31/26  Cecy Garza, APRN-CNP   fexofenadine (Allegra) 180 mg tablet Take 1 tablet (180 mg) by mouth every other day.    Historical Provider, MD   furosemide (Lasix) 40 mg tablet Take 1 tablet (40 mg) by mouth if needed (swelling).    Historical Provider, MD   Jardiance 25 mg Take 1 tablet (25 mg) by mouth once daily.    Historical Provider, MD   loratadine (Claritin) 10 mg tablet Take 1 tablet (10 mg) by mouth every other day.    Historical Provider, MD   methylphenidate (Ritalin) 10 mg tablet Take 1 tablet (10 mg) by mouth if needed. 5/7/24   Historical Provider, MD   pantoprazole (ProtoNix) 40 mg EC tablet Take 1 tablet (40 mg) by mouth once daily in the morning. Take before meals. Do not crush, chew, or  split.    Historical Provider, MD   potassium chloride (Klor-Con) 20 mEq packet Take 20 mEq by mouth if needed (Takes w/ lasix).    Historical Provider, MD   valsartan (Diovan) 80 mg tablet Take 1 tablet (80 mg) by mouth once daily.    Historical Provider, MD   estradiol (Estrace) 0.01 % (0.1 mg/gram) vaginal cream Insert 0.25 Applicatorfuls (1 g) into the vagina 2 times a week. 9/5/24 11/8/24  Cecy Garza, APRN-CNP        ROS  Review of Systems   Constitutional: Negative.    HENT: Negative.     Eyes: Negative.    Respiratory: Negative.     Cardiovascular: Negative.    Gastrointestinal:         Occasional shooting pain in the superior aspect of the umbilicus   Endocrine: Negative.    Genitourinary:  Positive for dyspareunia.   Musculoskeletal: Negative.    Neurological: Negative.    Psychiatric/Behavioral: Negative.          Blood, Urine   Date Value Ref Range Status   07/25/2024 0.03 (TRACE) (A) NEGATIVE Final     POC Blood, Urine   Date Value Ref Range Status   08/05/2024 SMALL (1+) (A) NEGATIVE Final     Poc Nitrite, Urine   Date Value Ref Range Status   08/05/2024 NEGATIVE NEGATIVE Final     Urobilinogen, Urine   Date Value Ref Range Status   07/25/2024 Normal Normal mg/dL Final     POC Urobilinogen, Urine   Date Value Ref Range Status   08/05/2024 0.2 0.2, 1.0 EU/DL Final     POC Leukocytes, Urine   Date Value Ref Range Status   08/05/2024 NEGATIVE NEGATIVE Final         PHYSICAL EXAM:      There were no vitals taken for this visit.     No LMP recorded. Patient has had a hysterectomy.      Declines chaperone for physical exam.      Well developed, well nourished, in no apparent distress.   Neurologic/Psychiatric:  Awake, Alert and Oriented times 3.  Affect normal.     Palp of umbilicus at 12 o'clock elicits pain. Possible small umbilical hernia.    Other abd incisions c/d/i    GENITAL/URINARY:       External Genitalia:  The patient has normal appearing external genitalia, normal skenes and bartholins  glands, and a normal hair distribution.  Her vulva is without lesions, erythema or discharge.  It is non-tender with appropriate sensation.     Urethral Meatus:  Size normal, Location normal, Lesions absent, Prolapse absent,      Urethra:  Fullness absent, Masses absent,      Bladder:  Fullness absent, Masses absent, Tenderness absent,      Vagina:  General appearance normal, Estrogen effect normal, Discharge absent, Lesions absent     Cervix: Normal, no discharge.           Data and DIAGNOSTIC STUDIES REVIEWED   No results found.   Lab Results   Component Value Date    URINECULTURE No significant growth 08/14/2024      Lab Results   Component Value Date    GLUCOSE 106 (H) 07/24/2024    CALCIUM 8.6 07/24/2024     07/24/2024    K 3.6 07/24/2024    CO2 30 07/24/2024     07/24/2024    BUN 7 07/24/2024    CREATININE 0.52 07/24/2024     Lab Results   Component Value Date    WBC 7.1 07/24/2024    HGB 11.9 (L) 07/24/2024    HCT 36.1 07/24/2024    MCV 91 07/24/2024     07/24/2024          Jocelyn Atwood MD

## 2024-12-06 ENCOUNTER — TELEPHONE (OUTPATIENT)
Dept: OBSTETRICS AND GYNECOLOGY | Facility: CLINIC | Age: 59
End: 2024-12-06
Payer: COMMERCIAL

## 2024-12-08 ENCOUNTER — HOSPITAL ENCOUNTER (OUTPATIENT)
Dept: RADIOLOGY | Facility: HOSPITAL | Age: 59
Discharge: HOME | End: 2024-12-08
Payer: COMMERCIAL

## 2024-12-08 DIAGNOSIS — R10.33 UMBILICAL PAIN: ICD-10-CM

## 2024-12-08 PROCEDURE — 74176 CT ABD & PELVIS W/O CONTRAST: CPT | Performed by: RADIOLOGY

## 2024-12-08 PROCEDURE — 74176 CT ABD & PELVIS W/O CONTRAST: CPT

## 2024-12-13 DIAGNOSIS — K42.9 UMBILICAL HERNIA WITHOUT OBSTRUCTION AND WITHOUT GANGRENE: Primary | ICD-10-CM

## 2024-12-19 ENCOUNTER — EVALUATION (OUTPATIENT)
Dept: PHYSICAL THERAPY | Age: 59
End: 2024-12-19

## 2024-12-19 DIAGNOSIS — R10.2 PELVIC PAIN IN FEMALE: Primary | ICD-10-CM

## 2024-12-19 NOTE — PROGRESS NOTES
minutes  [] Neuromuscular reeducation 60367 -- minutes   [x] Therapeutic activities 19471 15 minutes   ?  Robyn Diaz, PT, DPT  [id number]    [facility]  Phone: [phone]  Fax: [phone]   If you have any questions or concerns, please don't hesitate to call.  Thank you for your referral.  ?    Physician's Certification / Comments     Frequency/Duration 1-2 / week for 10 visits.   Certification period From: 12/19/2024  To: 3/21/2025    I have reviewed the Plan of Care established for skilled therapy services and certify that the services are required and that they will be provided while the patient is under my care.    Physician's Comments/Revisions:         Physician's Printed Name:                                           Physician's Signature:                                                               Date:   By signing above, therapist’s plan is approved by physician. All patients under Medicare Insurance must be signed by physician.     Please review Patient's PT evaluation and if you agree sign/date and fax back to us at our clinic fax [phone]               ??  ?

## 2025-01-03 ENCOUNTER — TREATMENT (OUTPATIENT)
Dept: PHYSICAL THERAPY | Age: 60
End: 2025-01-03

## 2025-01-03 DIAGNOSIS — R10.2 PELVIC PAIN IN FEMALE: Primary | ICD-10-CM

## 2025-01-03 NOTE — PROGRESS NOTES
PHYSICAL THERAPY PROGRESS NOTE    Date:  1/3/2025  Initial Evaluation Date: 2024    Patient Name:  Jenna Solorzano   :  1965    Restrictions/Precautions:  none  Diagnosis:     Diagnosis Orders   1. Pelvic pain in female                 Insurance/Certification information:  [facility]    Referring Physician:  Annabella Buckner MD  Plan of care signed:  Date of Surgery/Injury: 2024     Visit# / total visits: 2 / 10    Treating Therapist: Robyn Diaz, PT, DPT  [id number]  Past Medical History:   Past Medical History:   Diagnosis Date    Acute systolic congestive heart failure, NYHA class 3 (HCC) 3/27/2014    Chronic gastritis 3/26/2014    Herniated disc     HTN (hypertension) 3/26/2014    Hyperlipidemia      Past Surgical History:   Past Surgical History:   Procedure Laterality Date    BACK SURGERY      CYSTOURETHROSCOPY/URETHRAL DILATION Bilateral 2000    HYSTERECTOMY         Patient identified by name and birth date: Yes       SUBJECTIVE      Pt tried to implement squatty potty set up but was having difficulty due to narrow set up of bathroom. Squatty potty was delivered last night but has not used yet. She reports variable ability increase fluid intake. She has been working on diaphragmatic breathing.      OBJECTIVE     External Connective Tissue/Muscle Assessment: ?  Abdominal Wall: NT  Breathing: primarily chest lift throughout all attempts for diaphragmatic breathing, improved with tactile cue of placing hand at chest and one at abdomen    Informed consent for internal evaluation consent given : yes, signed copy in chart.    External PF Observation:  Contraction: WNL  Relax/Rest: WNL  Perineal Bulge: minimal descent noted  Introitus:WNL    Inspiration: WNL  Cough: NT  Skin Condition: WNL no areas of redness or irritation      Internal Pelvic Floor Connective Tissue/Muscle Tone:   Left Right   Introitus WNL WNL   Bulbospongiosus WNL WNL   Ischiocavernosis WNL WNL   Periurethrals WNL WNL

## 2025-01-07 ENCOUNTER — OFFICE VISIT (OUTPATIENT)
Dept: SURGERY | Facility: CLINIC | Age: 60
End: 2025-01-07
Payer: COMMERCIAL

## 2025-01-07 VITALS — BODY MASS INDEX: 23.88 KG/M2 | HEIGHT: 66 IN | WEIGHT: 148.6 LBS

## 2025-01-07 DIAGNOSIS — K42.9 UMBILICAL HERNIA WITHOUT OBSTRUCTION AND WITHOUT GANGRENE: Primary | ICD-10-CM

## 2025-01-07 PROCEDURE — 1036F TOBACCO NON-USER: CPT | Performed by: SURGERY

## 2025-01-07 PROCEDURE — 99213 OFFICE O/P EST LOW 20 MIN: CPT | Performed by: SURGERY

## 2025-01-07 PROCEDURE — 99203 OFFICE O/P NEW LOW 30 MIN: CPT | Performed by: SURGERY

## 2025-01-07 PROCEDURE — 3008F BODY MASS INDEX DOCD: CPT | Performed by: SURGERY

## 2025-01-07 ASSESSMENT — PAIN SCALES - GENERAL: PAINLEVEL_OUTOF10: 0-NO PAIN

## 2025-01-07 NOTE — PROGRESS NOTES
"History Of Present Illness  Zenaida Morales is a 59 y.o. female presenting with a small painful umbilical hernia.  She had urogynecological procedure that was successful.  About a month after the procedure she began having some pain around her umbilicus.  CT scan showed a small umbilical hernia.  The pain can be intermittent and at times is severe.  She has otherwise good stable health.  No other abdominal surgeries.        Last Recorded Vitals  Height 1.676 m (5' 6\"), weight 67.4 kg (148 lb 9.6 oz).  Physical Examination  Awake and alert.  Normal respiration.    Abdominal exam she has well-healed laparoscopy scars.  She has a very deep umbilicus.  She states it has always been that way.  She is tender at the bottom of this.  She has some problems with that skin at the bottom of her umbilicus.  She has had this in the past.  Even before her previous surgery.      Relevant Results  I reviewed the CT scan with her.  I also reviewed the previous CT scan that she had right after her last operation when she also had COVID.  She has a small umbilical hernia.  I showed her where her deep umbilical path goes right down to the base and to the hernia.    Assessment/Plan painful umbilical hernia.  I discussed the repair of it.  Will just do an open fashion under MAC anesthesia.  She agrees to proceed with this.  She will follow-up me when she wants to schedule the surgery.    Joey Dumas MD FACS  Professor of Surgery  Sharath Roque Chair in Surgical [place]  [facility] [employer]  [address] [place] [address], [address]  Phone [phone]  email: [email or web address]        "

## 2025-01-07 NOTE — LETTER
"January 7, 2025     Jocelyn Atwood MD  [address] [place] [facility], [address]  [facility] [place] [address]    Patient: Zenaida Morales   YOB: 1965   Date of Visit: 1/7/2025       Dear Dr. Jocelyn Atwood MD:    Thank you for referring Zenaida Morales to me for evaluation. Below are my notes for this consultation.  If you have questions, please do not hesitate to call me. I look forward to following your patient along with you.       Sincerely,     Joey Dumas MD      CC: No Recipients  ______________________________________________________________________________________    History Of Present Illness  Zenaida Morales is a 59 y.o. female presenting with a small painful umbilical hernia.  She had urogynecological procedure that was successful.  About a month after the procedure she began having some pain around her umbilicus.  CT scan showed a small umbilical hernia.  The pain can be intermittent and at times is severe.  She has otherwise good stable health.  No other abdominal surgeries.        Last Recorded Vitals  Height 1.676 m (5' 6\"), weight 67.4 kg (148 lb 9.6 oz).  Physical Examination  Awake and alert.  Normal respiration.    Abdominal exam she has well-healed laparoscopy scars.  She has a very deep umbilicus.  She states it has always been that way.  She is tender at the bottom of this.  She has some problems with that skin at the bottom of her umbilicus.  She has had this in the past.  Even before her previous surgery.      Relevant Results  I reviewed the CT scan with her.  I also reviewed the previous CT scan that she had right after her last operation when she also had COVID.  She has a small umbilical hernia.  I showed her where her deep umbilical path goes right down to the base and to the hernia.    Assessment/Planpainful umbilical hernia.  I discussed the repair of it.  Will just do an open fashion under MAC anesthesia.  She agrees to proceed with this.  She will " follow-up me when she wants to schedule the surgery.    Joey Dumas MD FACS  Professor of Surgery  Sharath Roque Chair in Surgical [place]  [facility] [employer]  [address] [place] [address], [address]  Phone [phone]  email: [email or web address]

## 2025-01-10 ENCOUNTER — TREATMENT (OUTPATIENT)
Dept: PHYSICAL THERAPY | Age: 60
End: 2025-01-10
Payer: COMMERCIAL

## 2025-01-10 DIAGNOSIS — R10.2 PELVIC PAIN IN FEMALE: Primary | ICD-10-CM

## 2025-01-10 PROCEDURE — 97530 THERAPEUTIC ACTIVITIES: CPT | Performed by: PHYSICAL THERAPIST

## 2025-01-10 PROCEDURE — 97110 THERAPEUTIC EXERCISES: CPT | Performed by: PHYSICAL THERAPIST

## 2025-01-10 NOTE — PROGRESS NOTES
PHYSICAL THERAPY PROGRESS NOTE    Date:  1/10/2025  Initial Evaluation Date: 2024    Patient Name:  Jenna Solorzano   :  1965    Restrictions/Precautions:  none  Diagnosis:     Diagnosis Orders   1. Pelvic pain in female                Insurance/Certification information:  [facility]    Referring Physician:  Annabella Buckner MD  Plan of care signed:  Date of Surgery/Injury: 2024     Visit# / total visits: 3 / 10    Treating Therapist: Robyn Diaz, PT, DPT  [id number]  Past Medical History:   Past Medical History:   Diagnosis Date    Acute systolic congestive heart failure, NYHA class 3 (HCC) 3/27/2014    Chronic gastritis 3/26/2014    Herniated disc     HTN (hypertension) 3/26/2014    Hyperlipidemia      Past Surgical History:   Past Surgical History:   Procedure Laterality Date    BACK SURGERY      CYSTOURETHROSCOPY/URETHRAL DILATION Bilateral 2000    HYSTERECTOMY         Patient identified by name and birth date: Yes       SUBJECTIVE      Pt reports mild difficulty with supine butterfly stretch. She is able to complete TE's 1x/day typically, sometimes is able to complete 2nd time/day. Pt had to take miralax for a few days but notes without it bowels have returned to regular pattern.      OBJECTIVE     External Connective Tissue/Muscle Assessment: ?  Abdominal Wall: NT  Breathing: primarily chest lift throughout all attempts for diaphragmatic breathing, improved with tactile cue of placing hand at chest and one at abdomen    Informed consent for internal evaluation consent given : deferred this date    External PF Observation:  Contraction: --  Relax/Rest: --  Perineal Bulge: --  Introitus:--    Inspiration: --  Cough: --  Skin Condition: --      Internal Pelvic Floor Connective Tissue/Muscle Tone: (from 1/3/25 session)   Left Right   Introitus WNL WNL   Bulbospongiosus WNL WNL   Ischiocavernosis WNL WNL   Periurethrals WNL WNL   Perineal body Hypertonic/Short Hypertonic/Short

## 2025-01-23 ENCOUNTER — TREATMENT (OUTPATIENT)
Dept: PHYSICAL THERAPY | Age: 60
End: 2025-01-23

## 2025-01-23 DIAGNOSIS — R10.2 PELVIC PAIN IN FEMALE: Primary | ICD-10-CM

## 2025-01-23 NOTE — PROGRESS NOTES
dynamics/Squatty potty/elevation  [x] Postural/breathing techniques  [] Lubricant use and recommendations [] Genital Hygiene     Method of Education: [x] Verbal  [x] Demo  [x] Written  Comprehension of Education:  [x] Verbalizes understanding.  [] Demonstrates understanding.  [x] Needs Review.  [] Demonstrates/verbalizes understanding of HEP/Ed previously given.     HEP: squatty potty, continue to work on fluid intake, happy baby, supine butterfly, hip ER/IR    Next visit: continue to progress relaxation of PF for decreased sensitivity and pain management    ASSESSMENT/COMMENTS     Pt is making Fair progress toward stated plan of care.   -Rehab Potential: Good  -Requires PT Follow Up: Yes    Billing:   Time in: 1500  Time out: 1540  Total Time: 40    CPT codes:  [] PT Re-evaluation 41447  [] Manual therapy 25188 -- minutes  [x] Therapeutic exercises 45035 30 minutes  [] Neuromuscular reeducation 61657 -- minutes  [x] Therapeutic activities 89097 10 minutes    -Response to Treatment:   Pt requesting review of HEP and stretches for appropriate technique and form. She demonstrated appropriate technique and completion of TE's during session and was provided new copy of all HEP. Pt planning to order pelvic wand prior to next session to training of at home releases.     GOALS/PLAN     Continue per initial evaluation 12/19/24    Plan:   [x]  Continues Plan of care: Treatment covered based on POC and graduated to patient's progress. Pt education continues at each visit to obtain maximum benefits from skilled PT intervention.  []  Alter Plan of care:   []  Discharge:      Robyn Diaz, PT, DPT  [id number]    [facility]  Phone: [phone]  Fax: [phone]              ??  ?

## 2025-02-04 ENCOUNTER — HOSPITAL ENCOUNTER (OUTPATIENT)
Facility: HOSPITAL | Age: 60
Setting detail: OUTPATIENT SURGERY
End: 2025-02-04
Attending: SURGERY | Admitting: SURGERY
Payer: COMMERCIAL

## 2025-02-04 PROBLEM — K42.9 UMBILICAL HERNIA WITHOUT OBSTRUCTION AND WITHOUT GANGRENE: Status: ACTIVE | Noted: 2025-01-07

## 2025-02-07 ENCOUNTER — TREATMENT (OUTPATIENT)
Dept: PHYSICAL THERAPY | Age: 60
End: 2025-02-07

## 2025-02-07 DIAGNOSIS — R10.2 PELVIC PAIN IN FEMALE: Primary | ICD-10-CM

## 2025-02-07 NOTE — PROGRESS NOTES
PHYSICAL THERAPY PROGRESS NOTE    Date:  2025  Initial Evaluation Date: 2024    Patient Name:  Jenna Solorzano   :  1965    Restrictions/Precautions:  none  Diagnosis:     Diagnosis Orders   1. Pelvic pain in female              Insurance/Certification information:  [facility]    Referring Physician:  Annabella Buckner MD  Plan of care signed: yes  Date of Surgery/Injury: 2024     Visit# / total visits: 5 / 10    Treating Therapist: Robyn Diaz, PT, DPT  [id number]  Past Medical History:   Past Medical History:   Diagnosis Date    Acute systolic congestive heart failure, NYHA class 3 (HCC) 3/27/2014    Chronic gastritis 3/26/2014    Herniated disc     HTN (hypertension) 3/26/2014    Hyperlipidemia      Past Surgical History:   Past Surgical History:   Procedure Laterality Date    BACK SURGERY      CYSTOURETHROSCOPY/URETHRAL DILATION Bilateral 2000    HYSTERECTOMY         Patient identified by name and birth date: Yes       SUBJECTIVE      Pt reports feeling \"ok\" upon arrival. She purchased pelvic wand but was unable to find at home prior to arrival to session. Pt is noticing subtle changes between sides of hips/pelvis during HEP.      OBJECTIVE     External Connective Tissue/Muscle Assessment: ?  Abdominal Wall: NT  Breathing: primarily chest lift throughout all attempts for diaphragmatic breathing, improved with tactile cue of placing hand at chest and one at abdomen    Informed consent for internal evaluation consent given : deferred this date    External PF Observation:  Contraction: --  Relax/Rest: --  Perineal Bulge: --  Introitus:--    Inspiration: --  Cough: --  Skin Condition: --      Internal Pelvic Floor Connective Tissue/Muscle Tone: (from 1/3/25 session)   Left Right   Introitus Trigger point Trigger point   Bulbospongiosus WNL Trigger point   Ischiocavernosis WNL WNL   Periurethrals WNL WNL   Perineal body Hypertonic/Short Hypertonic/Short   Pubococcygeus WNL

## 2025-02-14 ENCOUNTER — TREATMENT (OUTPATIENT)
Dept: PHYSICAL THERAPY | Age: 60
End: 2025-02-14

## 2025-02-14 DIAGNOSIS — R10.2 PELVIC PAIN IN FEMALE: Primary | ICD-10-CM

## 2025-02-14 NOTE — PROGRESS NOTES
point   Coccygeus WNL Hypertonic/Short and Trigger point   Obturator Internus NT Hypertonic/Short and Trigger point   External anal sphincter NT NT   Puborectalis NT NT       Tone assessment:  Pt demonstrated elevated tone along R pelvis vs L. Pt had increased sensitivity to R side palpation consistent with restrictions. Pt educated in pelvic wand use and technique including frequency. Pt verbalized understanding to complete every 3rd day and to apply only gentle pressure and to follow body's signals regarding sensitivity.    External interventions:  --    Pelvic Floor Internal Strength Assessment: (from 1/3/25 session)  Fair (3)  Endurance: 5 seconds (10 second max)  Repetitions: 4 (MVC with 4 seconds rest between each contraction until fatigue)  Fast/Quick Flicks: -- in 10 seconds       TREATMENT INTERVENTIONS     Manual Therapy (37321): 27 min (3083-8578)  Internal and/or external connective tissue and muscular tightness/restrictions identified above were treated with skin rolling, trigger point release, reciprocal inhibition, myofascial release, and/or contract/relax as appropriate.   Pt was educated in self internal release techniques with pelvic wand.    Neuromuscular Re-Education (63032):      Therapeutic Activities (14817) 10 min (3535-8048)  Pt was educated on anatomy/physiology of chief complaint  Pt was educated via verbal, tactile feedback on appropriate abdominal, diaphragm, and pelvic floor neuromuscular coordination and mobility necessary to address chief complaint.   Pt was educated and instructed in appropriate diaphragmatic breathing   Pt was educated on pelvic floor relaxation and pelvic/body awareness   Pt was educated on activity modification and symptoms to monitor prior to return to intercourse.  Pt educated in seating modification at work station and in chair next to her mother for decreased pressures along pudendal nerve    Therapeutic Exercise (15105): --    Patient Education:   Pt educated

## 2025-02-17 DIAGNOSIS — N95.2 VAGINAL ATROPHY: ICD-10-CM

## 2025-02-21 RX ORDER — ESTRADIOL 0.1 MG/G
1 CREAM VAGINAL 2 TIMES WEEKLY
Qty: 42.5 G | Refills: 2 | Status: SHIPPED | OUTPATIENT
Start: 2025-02-24 | End: 2026-05-16

## 2025-03-10 ENCOUNTER — TELEMEDICINE (OUTPATIENT)
Dept: OBSTETRICS AND GYNECOLOGY | Facility: CLINIC | Age: 60
End: 2025-03-10
Payer: COMMERCIAL

## 2025-03-10 DIAGNOSIS — R10.2 PELVIC PAIN IN FEMALE: Primary | ICD-10-CM

## 2025-03-10 PROCEDURE — 99212 OFFICE O/P EST SF 10 MIN: CPT | Performed by: OBSTETRICS & GYNECOLOGY

## 2025-03-10 ASSESSMENT — ENCOUNTER SYMPTOMS
ENDOCRINE NEGATIVE: 1
CONSTITUTIONAL NEGATIVE: 1
MUSCULOSKELETAL NEGATIVE: 1
CARDIOVASCULAR NEGATIVE: 1
PSYCHIATRIC NEGATIVE: 1
RESPIRATORY NEGATIVE: 1
EYES NEGATIVE: 1
NEUROLOGICAL NEGATIVE: 1
GASTROINTESTINAL NEGATIVE: 1

## 2025-03-10 NOTE — PROGRESS NOTES
Urogynecology  Provider:  Jocelyn Atwood MD  [phone]              ASSESSMENT AND PLAN:   59-year-old female being assessed for myofascial pelvic pain and umbilical hernia.    Diagnoses:  #1 Myofascial pelvic pain  #2 Umbilical hernia    Plan:  MPP  - Continue PFPT as prescribed.  - She reports improvement but acknowledges a setback due to a missed week of therapy.  - Encouraged adherence to the therapy regimen to maintain progress.  - She hasn't yet attempted sexual activity but plans to do so soon.   - Encouraged open communication about any difficulties or concerns that may arise during this process.    2. Umbilical hernia  - She reports intermittent stabbing pain, which may be related to a small hernia.  - She has postponed surgery due to family obligations but plans to rescheduled for mid-April.  - Discussed importance of addressing hernia to alleviate sxs and prevent potential complications.    Follow-up with Dr. Atwood in July or August for a post-surgery anniversary check-up. The office will contact the patient to schedule this appointment.    Scribe Attestation  By signing my name below, IEddie Scribe, attest that this documentation has been prepared under the direction and in the presence of Jocelyn Atwood MD on 03/10/2025 at 6:55 PM.    Agree with above. I Dr. Atwood, personally performed the services described in the documentation which was scribed virtually and confirm it is both complete and accurate.  Jocelyn Atwood MD      Problem List Items Addressed This Visit    None          I spent a total of 12 minutes in face to face and non face to face time at this virtual visit.          Jocelyn Atwood MD    Virtual or Telephone Consent    While technically available, the patient was unable or unwilling to consent to connect via audio/video telehealth technology; therefore, I performed this visit using a real-time audio only connection between Zenaida Morales & Jocelyn LORD  MD Angelic.  Verbal consent was requested and obtained from Zenaida Morales on this date, 03/10/25 for a telehealth visit and the patient's location was confirmed at the time of the visit.    Virtual visit today: The patient's identity was confirmed and that she is located in [place].   Jocelyn Atwood MD identified herself and the patient consented to a telehealth visit today. Telehealth visit time: 4 minutes.      HISTORY OF PRESENT ILLNESS:     Last visit 11/2024  Diagnoses:  #1 Apical and anterior vaginal wall prolapse (resolved)  #2 Stress incontinence (resolved)  #3 Myofascial pelvic pain  #4 Possible umbilical hernia     Plan:  Apical & anterior vaginal wall prolapse, MILY, post-op   - 7/19/24 Robot-assisted sacrocolpopexy, Bilateral salpingectomy, Midurethral sling, Posterior repair, Perineorrhaphy, Cystourethroscopy.  - On exam, her abdominal incisions are well-healed.     2. Myofascial pelvic pain, possible umbilical hernia  - On exam, at the 12 o'clock position on the umbilicus, we felt the smallest bit of defect and were able to reproduce the pain that she complains of.  > She may have a small umbilical hernia at this site.  - She has a diffusely short and tight pelvic floor which is significantly high-toned, which is consistent with a significant trigger point of levator ani and iliococcygeus bilaterally.  - Discussed that we think she has MPP and would benefit from PFPT. She is amenable.  > PFPT requisition sent today and gave her the list of local physical therapists with their corresponding locations/contact information.  - Ordered CT scan to see if there's a defect at the level of the umbilicus.  > If so, we would have her see Dr. Dumas in general surgery, potentially for hernia repair. She is amenable.     Follow-up with Dr. Atwood virtually in 4 months to see how she's doing with PFPT and let her know about the results of the CT.          Pelvic Symptoms:   - She has been going to PFPT and notes  that things are loosening up, although progress is slower than initially hoped.  - She missed a week of PFPT due to personal circumstances, including the death of a stepfather, which impacted progress.  - She has not yet attempted sexual intercourse but plans to try soon and is nervous about it.  - Exercises provided by the therapist are becoming easier, indicating some improvement.    Hernia Symptoms:  - She was recently diagnosed with a small hernia, which is causing stabbing pain that occurs unexpectedly, even when standing or lying down.  - She had to cancel a scheduled surgery due to family obligations, including caring for a mother and assisting with a family member's recovery from surgery.  - She is considering rescheduling the hernia surgery for April 11th and April 18th, depending on family circumstances.  - She is waiting for the right time to proceed with the surgery, balancing personal and family needs.           Past Medical History:       Past Medical History:   Diagnosis Date    ADHD     Asthma     CHF (congestive heart failure)     Per [facility] cardiology note 2018    Delayed emergence from general anesthesia     GERD (gastroesophageal reflux disease)     H/O echocardiogram 12/19/2023    Scanned to media (no evidence of dilated cardiomyopathy)    H/O right heart catheterization 02/25/2015    Hyperlipidemia     Hypertension     Lumbago with sciatica     Lumbar spondylosis     Obesity     Orthopnea     Panic disorder     PONV (postoperative nausea and vomiting)     Sleep apnea     unable to tolerate CPAP    Type 2 diabetes mellitus     Urge incontinence of urine     Vaginal vault prolapse           Past Surgical History:       Past Surgical History:   Procedure Laterality Date    CARDIAC CATHETERIZATION Right 02/25/2015    COLONOSCOPY      HYSTERECTOMY      UPPER GASTROINTESTINAL ENDOSCOPY           Medications:       Prior to Admission medications    Medication Sig Start Date End Date Taking? Authorizing  Provider   acetaminophen (TylenoL) 325 mg tablet Take 2 tablets (650 mg) by mouth every 6 hours if needed for mild pain (1 - 3). 7/25/24   JAZZ Sher   atorvastatin (Lipitor) 40 mg tablet Take 1 tablet (40 mg) by mouth once daily.    Historical Provider, MD   carvedilol (Coreg) 6.25 mg tablet Take 1 tablet (6.25 mg) by mouth 2 times a day.    Historical Provider, MD   diclofenac sodium (Voltaren) 1 % gel Apply 4.5 inches (4 g) topically 4 times a day as needed (arthritis pain).    Historical Provider, MD   estradiol (Estrace) 0.01 % (0.1 mg/gram) vaginal cream Insert 0.25 Applicatorfuls (1 g) into the vagina 2 times a week. 2/24/25 5/16/26  NIDIA Remy-CNP   fexofenadine (Allegra) 180 mg tablet Take 1 tablet (180 mg) by mouth every other day.  Patient not taking: Reported on 1/7/2025    Historical Provider, MD   fluticasone furoate-vilanteroL (Breo Ellipta) 100-25 mcg/dose inhaler Inhale 1 puff once daily.    Historical Provider, MD   furosemide (Lasix) 40 mg tablet Take 1 tablet (40 mg) by mouth if needed (swelling).    Historical Provider, MD   Jardiance 25 mg Take 1 tablet (25 mg) by mouth once daily.    Historical Provider, MD   loratadine (Claritin) 10 mg tablet Take 1 tablet (10 mg) by mouth every other day.    Historical Provider, MD   methylphenidate (Ritalin) 10 mg tablet Take 1 tablet (10 mg) by mouth if needed. 5/7/24   Historical Provider, MD   Mounjaro 5 mg/0.5 mL pen injector Inject 5 mg under the skin 1 (one) time per week.    Historical Provider, MD   pantoprazole (ProtoNix) 40 mg EC tablet Take 1 tablet (40 mg) by mouth once daily in the morning. Take before meals. Do not crush, chew, or split.    Historical Provider, MD   potassium chloride (Klor-Con) 20 mEq packet Take 20 mEq by mouth if needed (Takes w/ lasix).    Historical Provider, MD   valsartan (Diovan) 80 mg tablet Take 1 tablet (80 mg) by mouth once daily.    Historical Provider, MD GARCIA  Review of Systems    Constitutional: Negative.    HENT: Negative.     Eyes: Negative.    Respiratory: Negative.     Cardiovascular: Negative.    Gastrointestinal: Negative.    Endocrine: Negative.    Genitourinary: Negative.    Musculoskeletal: Negative.    Neurological: Negative.    Psychiatric/Behavioral: Negative.            Data and DIAGNOSTIC STUDIES REVIEWED   No results found.   Lab Results   Component Value Date    URINECULTURE No significant growth 08/14/2024      Lab Results   Component Value Date    GLUCOSE 106 (H) 07/24/2024    CALCIUM 8.6 07/24/2024     07/24/2024    K 3.6 07/24/2024    CO2 30 07/24/2024     07/24/2024    BUN 7 07/24/2024    CREATININE 0.52 07/24/2024     Lab Results   Component Value Date    WBC 7.1 07/24/2024    HGB 11.9 (L) 07/24/2024    HCT 36.1 07/24/2024    MCV 91 07/24/2024     07/24/2024

## 2025-03-18 ENCOUNTER — TREATMENT (OUTPATIENT)
Dept: PHYSICAL THERAPY | Age: 60
End: 2025-03-18

## 2025-03-18 DIAGNOSIS — R10.2 PELVIC PAIN IN FEMALE: Primary | ICD-10-CM

## 2025-03-18 NOTE — PROGRESS NOTES
PHYSICAL THERAPY PROGRESS NOTE / DISCHARGE SUMMARY    Date:  3/18/2025  Initial Evaluation Date: 2024    Patient Name:  Jenna Solorzano   :  1965    Restrictions/Precautions:  none  Diagnosis:     Diagnosis Orders   1. Pelvic pain in female                Insurance/Certification information:  [facility] [employer]    Referring Physician:  Annabella Buckner MD  Plan of care signed: yes  Date of Surgery/Injury: 2024     Visit# / total visits: 7 / 10    Treating Therapist: Robyn Diaz PT, DPT  [id number]  Past Medical History:   Past Medical History:   Diagnosis Date    Acute systolic congestive heart failure, NYHA class 3 (HCC) 3/27/2014    Chronic gastritis 3/26/2014    Herniated disc     HTN (hypertension) 3/26/2014    Hyperlipidemia      Past Surgical History:   Past Surgical History:   Procedure Laterality Date    BACK SURGERY      CYSTOURETHROSCOPY/URETHRAL DILATION Bilateral 2000    HYSTERECTOMY         Patient identified by name and birth date: Yes       SUBJECTIVE      Pt reports independence with HEP at this time. She has been using squatty potty which has been helpful. She has been tolerating pelvic wand but reports some nervousness regarding pressure.        Patient Goals:   Resolve pain -- 80% improvement  Tolerate well woman exam and intercourse without pain -- pt tolerates digital exam has not had formal pelvic exam with speculum, schedule with partner has not allowed for intimacy  Be able to urinate without needing to move around -- 80% improvement    OBJECTIVE     External Connective Tissue/Muscle Assessment: ?  Abdominal Wall: NT  Breathing: primarily chest lift throughout all attempts for diaphragmatic breathing, improved with tactile cue of placing hand at chest and one at abdomen    Informed consent for internal evaluation consent given : deferred this date    External PF Observation:  Contraction: --  Relax/Rest: --  Perineal Bulge: --  Introitus:--    Inspiration: --  Cough:

## 2025-05-14 NOTE — PROGRESS NOTES
Subjective   Patient ID:   [id number]   Zenaida Morales is a 60 y.o. female who presents for Hives (NPV ).    Chief Complaint   Patient presents with    Hives     NPV       This is a new patient, with H/O HTN, presenting virtually from home, unaccompanied, for evaluation of urticaria.    Patient reports she has longstanding, persistent hives.  She states that her hives are highly variable.  She notices her hives are worse when she has stress.  Her symptoms typically start with intense itching of her hands.  She then developed diffuse urticaria.  She is currently on Allegra daily.  She will switch between Allegra and Claritin on a regular basis.  She did try Zyrtec in the past but it caused significant sedation.    She has never been evaluated for her hives.  She does not feel that she is an allergic person.  She denies any history of autoimmunity.    Recently had symptoms of a URI.  She started Zithromax 1 day ago.            Objective   There were no vitals taken for this visit.     Physical Exam  Constitutional:       Appearance: Normal appearance.   HENT:      Head: Normocephalic and atraumatic.   Eyes:      Extraocular Movements: Extraocular movements intact.      Conjunctiva/sclera: Conjunctivae normal.      Pupils: Pupils are equal, round, and reactive to light.   Pulmonary:      Effort: Pulmonary effort is normal.   Neurological:      Mental Status: She is alert and oriented to person, place, and time. Mental status is at baseline.   Psychiatric:         Mood and Affect: Mood normal.         Behavior: Behavior normal.         Thought Content: Thought content normal.         Judgment: Judgment normal.       Assessment/Plan     Chronic urticaria  Patient has a longstanding history of intermittent urticaria, usually exacerbated by stress.    The differential diagnosis for chronic urticaria is vast and can include autoimmunity, allergy, malignancy, parasite infection, immunologic disorder, thyroid dysfunction,  vitamin D deficiency, and hepatic or renal dysfunction. I am going to order a complete laboratory evaluation.      I would like her to continue Allegra or Claritin daily.  I recommended that she takes an extra dose at the onset of hand itching.    By signing my name below, I, Kassi Rodgers, attest that this documentation has been prepared under the direction and in the presence of Shawna Weeks MD.  All medical record entries made by the Scribe were at my direction and personally dictated by me. I have reviewed the chart and agree that the record accurately reflects my personal performance of the history, physical exam, discussion and plan.

## 2025-05-15 ENCOUNTER — APPOINTMENT (OUTPATIENT)
Dept: ALLERGY | Facility: CLINIC | Age: 60
End: 2025-05-15
Payer: COMMERCIAL

## 2025-05-15 DIAGNOSIS — L50.9 URTICARIA: Primary | ICD-10-CM

## 2025-05-15 PROBLEM — L50.8 CHRONIC URTICARIA: Status: ACTIVE | Noted: 2025-05-15

## 2025-05-15 PROCEDURE — 1036F TOBACCO NON-USER: CPT | Performed by: ALLERGY & IMMUNOLOGY

## 2025-05-15 PROCEDURE — 99204 OFFICE O/P NEW MOD 45 MIN: CPT | Performed by: ALLERGY & IMMUNOLOGY

## 2025-05-15 RX ORDER — BLOOD-GLUCOSE SENSOR
EACH MISCELLANEOUS
COMMUNITY
Start: 2025-03-04

## 2025-05-15 RX ORDER — MUPIROCIN 20 MG/G
OINTMENT TOPICAL
COMMUNITY
Start: 2025-01-27

## 2025-05-15 RX ORDER — METHYLPREDNISOLONE 4 MG/1
4 TABLET ORAL
COMMUNITY
Start: 2025-05-14

## 2025-05-15 RX ORDER — MONTELUKAST SODIUM 10 MG/1
10 TABLET ORAL NIGHTLY
COMMUNITY
Start: 2025-05-07

## 2025-05-15 RX ORDER — BLOOD-GLUCOSE,RECEIVER,CONT
EACH MISCELLANEOUS
COMMUNITY
Start: 2025-01-27

## 2025-05-15 RX ORDER — AZITHROMYCIN 250 MG/1
TABLET, FILM COATED ORAL
COMMUNITY
Start: 2025-05-14

## 2025-05-15 NOTE — ASSESSMENT & PLAN NOTE
Patient has a longstanding history of intermittent urticaria, usually exacerbated by stress.    The differential diagnosis for chronic urticaria is vast and can include autoimmunity, allergy, malignancy, parasite infection, immunologic disorder, thyroid dysfunction, vitamin D deficiency, and hepatic or renal dysfunction. I am going to order a complete laboratory evaluation.      I would like her to continue Allegra or Claritin daily.  I recommended that she takes an extra dose at the onset of hand itching.

## 2025-05-25 LAB
25(OH)D3+25(OH)D2 SERPL-MCNC: 49 NG/ML (ref 30–100)
A ALTERNATA IGE QN: <0.1 KU/L
A ALTERNATA IGE RAST: 0
A FUMIGATUS IGE QN: <0.1 KU/L
A FUMIGATUS IGE RAST: 0
ANA SER QL IF: NEGATIVE
BERMUDA GRASS IGE QN: <0.1 KU/L
BERMUDA GRASS IGE RAST: 0
BOXELDER IGE QN: <0.1 KU/L
BOXELDER IGE RAST: 0
C HERBARUM IGE QN: <0.1 KU/L
C HERBARUM IGE RAST: 0
CALIF WALNUT POLN IGE QN: <0.1 KU/L
CALIF WALNUT POLN IGE RAST: 0
CAT DANDER IGE QN: <0.1 KU/L
CAT DANDER IGE RAST: 0
CMN PIGWEED IGE QN: <0.1 KU/L
CMN PIGWEED IGE RAST: 0
COMMON RAGWEED IGE QN: <0.1 KU/L
COMMON RAGWEED IGE RAST: 0
COTTONWOOD IGE QN: <0.1 KU/L
COTTONWOOD IGE RAST: 0
D FARINAE IGE QN: 4.45 KU/L
D FARINAE IGE RAST: 3
D PTERONYSS IGE QN: 5.15 KU/L
D PTERONYSS IGE RAST: 3
DOG DANDER IGE QN: <0.1 KU/L
DOG DANDER IGE RAST: 0
ERYTHROCYTE [SEDIMENTATION RATE] IN BLOOD BY WESTERGREN METHOD: 2 MM/H
FC EPSILON RI + RII AB SER-ACNC: NORMAL
IGE SERPL-ACNC: 43 KU/L
LONDON PLANE IGE QN: <0.1 KU/L
LONDON PLANE IGE RAST: 0
MOUSE URINE PROT IGE QN: <0.1 KU/L
MOUSE URINE PROT IGE RAST: 0
MT JUNIPER IGE QN: <0.1 KU/L
MT JUNIPER IGE RAST: 0
P NOTATUM IGE QN: <0.1 KU/L
P NOTATUM IGE RAST: 0
PECAN/HICK TREE IGE QN: <0.1 KU/L
PECAN/HICK TREE IGE RAST: 0
QUEST CD203C (PERCENT OF BASOPHILS): NORMAL
QUEST COMMENT- ANTI-IGE RECEPTOR AB: NORMAL
QUEST INTERPRETATION- ANTI-IGE RECEPTOR AB: NORMAL
QUEST POPULATION- ANTI-IGE RECEPTOR AB: NORMAL
REF LAB TEST REF RANGE: NORMAL
ROACH IGE QN: <0.1 KU/L
ROACH IGE RAST: 0
SALTWORT IGE QN: <0.1 KU/L
SALTWORT IGE RAST: 0
SHEEP SORREL IGE QN: <0.1 KU/L
SHEEP SORREL IGE RAST: 0
SILVER BIRCH IGE QN: <0.1 KU/L
SILVER BIRCH IGE RAST: 0
THYROGLOB AB SERPL-ACNC: <1 IU/ML
THYROPEROXIDASE AB SERPL-ACNC: 1 IU/ML
TIMOTHY IGE QN: <0.1 KU/L
TIMOTHY IGE RAST: 0
TRYPTASE SERPL-MCNC: 5 MCG/L
WHITE ASH IGE QN: <0.1 KU/L
WHITE ASH IGE RAST: 0
WHITE ELM IGE QN: <0.1 KU/L
WHITE ELM IGE RAST: 0
WHITE MULBERRY IGE QN: <0.1 KU/L
WHITE MULBERRY IGE RAST: 0
WHITE OAK IGE QN: <0.1 KU/L
WHITE OAK IGE RAST: 0

## 2025-06-04 LAB
25(OH)D3+25(OH)D2 SERPL-MCNC: 49 NG/ML (ref 30–100)
A ALTERNATA IGE QN: <0.1 KU/L
A ALTERNATA IGE RAST: 0
A FUMIGATUS IGE QN: <0.1 KU/L
A FUMIGATUS IGE RAST: 0
ANA SER QL IF: NEGATIVE
BERMUDA GRASS IGE QN: <0.1 KU/L
BERMUDA GRASS IGE RAST: 0
BOXELDER IGE QN: <0.1 KU/L
BOXELDER IGE RAST: 0
C HERBARUM IGE QN: <0.1 KU/L
C HERBARUM IGE RAST: 0
CALIF WALNUT POLN IGE QN: <0.1 KU/L
CALIF WALNUT POLN IGE RAST: 0
CAT DANDER IGE QN: <0.1 KU/L
CAT DANDER IGE RAST: 0
CMN PIGWEED IGE QN: <0.1 KU/L
CMN PIGWEED IGE RAST: 0
COMMON RAGWEED IGE QN: <0.1 KU/L
COMMON RAGWEED IGE RAST: 0
COTTONWOOD IGE QN: <0.1 KU/L
COTTONWOOD IGE RAST: 0
D FARINAE IGE QN: 4.45 KU/L
D FARINAE IGE RAST: 3
D PTERONYSS IGE QN: 5.15 KU/L
D PTERONYSS IGE RAST: 3
DOG DANDER IGE QN: <0.1 KU/L
DOG DANDER IGE RAST: 0
ERYTHROCYTE [SEDIMENTATION RATE] IN BLOOD BY WESTERGREN METHOD: 2 MM/H
FC EPSILON RI + RII AB SER-ACNC: <16 %
IGE SERPL-ACNC: 43 KU/L
LONDON PLANE IGE QN: <0.1 KU/L
LONDON PLANE IGE RAST: 0
MOUSE URINE PROT IGE QN: <0.1 KU/L
MOUSE URINE PROT IGE RAST: 0
MT JUNIPER IGE QN: <0.1 KU/L
MT JUNIPER IGE RAST: 0
P NOTATUM IGE QN: <0.1 KU/L
P NOTATUM IGE RAST: 0
PECAN/HICK TREE IGE QN: <0.1 KU/L
PECAN/HICK TREE IGE RAST: 0
QUEST CD203C (PERCENT OF BASOPHILS): 14.6 % (ref 0–12)
QUEST INTERPRETATION- ANTI-IGE RECEPTOR AB: ABNORMAL
QUEST POPULATION- ANTI-IGE RECEPTOR AB: ABNORMAL
REF LAB TEST REF RANGE: NORMAL
ROACH IGE QN: <0.1 KU/L
ROACH IGE RAST: 0
SALTWORT IGE QN: <0.1 KU/L
SALTWORT IGE RAST: 0
SHEEP SORREL IGE QN: <0.1 KU/L
SHEEP SORREL IGE RAST: 0
SILVER BIRCH IGE QN: <0.1 KU/L
SILVER BIRCH IGE RAST: 0
THYROGLOB AB SERPL-ACNC: <1 IU/ML
THYROPEROXIDASE AB SERPL-ACNC: 1 IU/ML
TIMOTHY IGE QN: <0.1 KU/L
TIMOTHY IGE RAST: 0
TRYPTASE SERPL-MCNC: 5 MCG/L
WHITE ASH IGE QN: <0.1 KU/L
WHITE ASH IGE RAST: 0
WHITE ELM IGE QN: <0.1 KU/L
WHITE ELM IGE RAST: 0
WHITE MULBERRY IGE QN: <0.1 KU/L
WHITE MULBERRY IGE RAST: 0
WHITE OAK IGE QN: <0.1 KU/L
WHITE OAK IGE RAST: 0

## (undated) DEVICE — DRAPE, COLUMN, DAVINCI XI

## (undated) DEVICE — OBTURATOR, BLADELESS , SU

## (undated) DEVICE — SPONGE, LAP, XRAY DECT, 4IN X 18IN, W/MASTER DMT, STERILE

## (undated) DEVICE — TROCAR SYSTEM, BALLOON, KII GELPORT, 12 X 100MM

## (undated) DEVICE — SUTURE, VICRYL, 0, SH 27 TAPER NEEDLE, UNDYED, BRAIDED

## (undated) DEVICE — PUMP, STRYKERFLOW 2 & HANDPIECE W/10FT. IRRIGATION TUBING

## (undated) DEVICE — SUTURE, VICRYL PLUS, 0, 27IN, UR-6, VIOLET, BRAIDED

## (undated) DEVICE — COVER, MAYO STAND, W/PAD, 23 IN, DISPOSABLE, PLASTIC, LF, STERILE

## (undated) DEVICE — GLOVE, SURGICAL, PROTEXIS PI BLUE W/NEUTHERA, 7.0, PF, LF

## (undated) DEVICE — NEEDLE, INSUFFLATION, 13GAX120MM, DISP

## (undated) DEVICE — GLOVE, SURGICAL, PROTEXIS PI MICRO, 7.0, PF, LF

## (undated) DEVICE — SYRINGE, 10 CC, LUER LOCK

## (undated) DEVICE — TUBE SET, PNEUMOCLEAR, SMOKE EVACU, HIGH-FLOW

## (undated) DEVICE — GLOVE, SURGICAL, PROTEXIS PI BLUE W/NEUTHERA, 6.0, PF, LF

## (undated) DEVICE — STAY SET, SURGICAL, 5MM SHARP HOOK, 8PK

## (undated) DEVICE — ACCESS SYS, KII SHIELDED BLADED, Z-THREAD, 12X100CM

## (undated) DEVICE — GLOVE, SURGICAL, PROTEXIS PI MICRO, 6.5, PF, LF

## (undated) DEVICE — SUTURE, MONOCRYL, 3-0, 27 IN, PS-2, UNDYED

## (undated) DEVICE — GLOVE, SURGICAL, PROTEXIS PI W/NEU-THERA, 6.5, PF, LF

## (undated) DEVICE — DRAPE PACK, LAVH, W/ATTACHED LEGGINGS, W/POUCH, 100 X 114 IN, LF, STERILE

## (undated) DEVICE — DRAPE, ARM XI

## (undated) DEVICE — LUBRICANT, ELECTROLUBE, F/ELECTRODE TIPS

## (undated) DEVICE — COVER, TIP HOT SHEARS ENDOWRIST

## (undated) DEVICE — RETRACTOR, SURGICAL, RING, PLASTIC, DISPOSABLE

## (undated) DEVICE — COUNTER, NEEDLE, FOAM BLOCK, W/MAGNET, W/BLADE GUARD, 10 COUNT, RED, LF

## (undated) DEVICE — Device

## (undated) DEVICE — CARE KIT, LAPAROSCOPIC, ADVANCED

## (undated) DEVICE — SUTURE, GORE-TEX CV-2 THX-26 36 DA"

## (undated) DEVICE — ADHESIVE, SKIN, LIQUIBAND EXCEED

## (undated) DEVICE — GLOVE, SURGICAL, PROTEXIS PI MICRO, 6.0, PF, LF

## (undated) DEVICE — SUTURE, MONOCRYL, 3-0, 27 IN, SH, UNDYED

## (undated) DEVICE — CORD, CHUCK-IT, ENDOSCOPIC, 10FT

## (undated) DEVICE — STAPLER, SKIN PROXIMATE, 35 WIDE

## (undated) DEVICE — SUTURE, VICRYL PLUS 3-0, SH, 27IN

## (undated) DEVICE — TRAY, FOLEY, LUBRI-SIL, 16FR, COMPLETE CARE W/STATLOCK

## (undated) DEVICE — SEAL, UNIVERSAL 5-8MM  XI